# Patient Record
Sex: FEMALE | Race: WHITE | Employment: UNEMPLOYED | URBAN - METROPOLITAN AREA
[De-identification: names, ages, dates, MRNs, and addresses within clinical notes are randomized per-mention and may not be internally consistent; named-entity substitution may affect disease eponyms.]

---

## 2017-01-27 ENCOUNTER — DOCUMENTATION ONLY (OUTPATIENT)
Dept: NEUROLOGY | Age: 57
End: 2017-01-27

## 2017-01-27 DIAGNOSIS — M62.838 MUSCLE SPASMS OF BOTH LOWER EXTREMITIES: ICD-10-CM

## 2017-01-27 RX ORDER — DIAZEPAM 5 MG/1
TABLET ORAL
Qty: 90 TAB | Refills: 0 | Status: SHIPPED | OUTPATIENT
Start: 2017-01-27 | End: 2017-02-28 | Stop reason: SDUPTHER

## 2017-02-28 DIAGNOSIS — M62.838 MUSCLE SPASMS OF BOTH LOWER EXTREMITIES: ICD-10-CM

## 2017-03-01 ENCOUNTER — DOCUMENTATION ONLY (OUTPATIENT)
Dept: NEUROLOGY | Age: 57
End: 2017-03-01

## 2017-03-01 RX ORDER — DIAZEPAM 5 MG/1
TABLET ORAL
Qty: 90 TAB | Refills: 0 | Status: SHIPPED | OUTPATIENT
Start: 2017-03-01 | End: 2017-03-07 | Stop reason: SDUPTHER

## 2017-03-07 ENCOUNTER — OFFICE VISIT (OUTPATIENT)
Dept: NEUROLOGY | Age: 57
End: 2017-03-07

## 2017-03-07 VITALS
HEART RATE: 91 BPM | HEIGHT: 58 IN | DIASTOLIC BLOOD PRESSURE: 72 MMHG | WEIGHT: 111 LBS | BODY MASS INDEX: 23.3 KG/M2 | OXYGEN SATURATION: 98 % | RESPIRATION RATE: 12 BRPM | SYSTOLIC BLOOD PRESSURE: 126 MMHG

## 2017-03-07 DIAGNOSIS — H53.2 DIPLOPIA: ICD-10-CM

## 2017-03-07 DIAGNOSIS — G25.82 STIFF-MAN SYNDROME: ICD-10-CM

## 2017-03-07 DIAGNOSIS — M62.838 MUSCLE SPASMS OF BOTH LOWER EXTREMITIES: ICD-10-CM

## 2017-03-07 DIAGNOSIS — M50.90 CERVICAL NECK PAIN WITH EVIDENCE OF DISC DISEASE: ICD-10-CM

## 2017-03-07 DIAGNOSIS — R41.3 MEMORY LOSS: Primary | ICD-10-CM

## 2017-03-07 DIAGNOSIS — R53.1 WEAKNESS GENERALIZED: ICD-10-CM

## 2017-03-07 DIAGNOSIS — M54.40 BACK PAIN OF LUMBOSACARAL REGION WITH SCIATICA: ICD-10-CM

## 2017-03-07 DIAGNOSIS — G60.9 UNSPECIFIED HEREDITARY AND IDIOPATHIC PERIPHERAL NEUROPATHY: ICD-10-CM

## 2017-03-07 RX ORDER — LANOLIN ALCOHOL/MO/W.PET/CERES
400 CREAM (GRAM) TOPICAL DAILY
COMMUNITY

## 2017-03-07 RX ORDER — DIAZEPAM 5 MG/1
TABLET ORAL
Qty: 90 TAB | Refills: 5 | Status: SHIPPED | OUTPATIENT
Start: 2017-03-07 | End: 2017-10-24 | Stop reason: SDUPTHER

## 2017-03-07 NOTE — MR AVS SNAPSHOT
Visit Information Date & Time Provider Department Dept. Phone Encounter #  
 3/7/2017  3:20 PM Denise Walker MD [facility] [phone] [id number] Follow-up Instructions Return in about 6 months (around 9/7/2017). Your Appointments 5/8/2017 10:10 AM  
Follow Up with Bruce Nur MD  
[facility] [address] [place] [address]) Appt Note: 6 month f/u [address] [facility] [address] [id number] [address] [facility]  
  
    
 10/24/2017  3:00 PM  
Follow Up with Denise Walker MD  
[facility] [address] [place] [address] Road) Appt Note: f/u gait/memory, jrb 3/7/17  
 [address] [place] [address], 
[address], [address] [phone]  
[address], [address] [facility], [address] [phone] Upcoming Health Maintenance Date Due Hepatitis C Screening 1960 DTaP/Tdap/Td series (1 - Tdap) 8/28/1980 PAP AKA CERVICAL CYTOLOGY 8/28/1980 BREAST CANCER SCRN MAMMOGRAM 8/28/2009 FOBT Q 1 YEAR AGE 50-75 8/28/2009 INFLUENZA AGE 9 TO ADULT 8/1/2016 Allergies as of 3/7/2017  Review Complete On: 3/7/2017 By: Denise Walker MD  
  
 Severity Noted Reaction Type Reactions Other Food  01/06/2015    Hives Solu medrol Methimazole  02/27/2012    Rash Current Immunizations  Reviewed on 11/7/2014 No immunizations on file. Not reviewed this visit You Were Diagnosed With   
  
 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  
 126/72 91 12 4' 10\" (1.473 m) 111 lb (50.3 kg) 98% BMI OB Status Smoking Status 23.2 kg/m2 Postmenopausal Never Smoker Vitals History BMI and BSA Data Body Mass Index Body Surface Area  
 23.2 kg/m 2 1.43 m 2 Preferred Pharmacy Pharmacy Name Phone [phone], [address] [place] [address] Reusing [phone] Your Updated Medication List  
  
   
This list is accurate as of: 3/7/17  3:45 PM.  Always use your most recent med list.  
  
  
  
  
 b complex vitamins tablet Take 1 Tab by mouth as needed. diazePAM 5 mg tablet Commonly known as:  VALIUM  
TAKE 1 TABLET BY MOUTH THREE TIMES DAILY AS NEEDED FOR MUSCLE SPASMS FISH OIL 1,000 mg Cap Generic drug:  omega-3 fatty acids-vitamin e Take 1-2 Caps by mouth every other day. magnesium oxide 400 mg tablet Commonly known as:  MAG-OX Take 400 mg by mouth daily. selenium 200 mcg Cap Take  by mouth two (2) times a day. VITAMIN D3 1,000 unit tablet Generic drug:  cholecalciferol Take 2,000 Units by mouth daily. Prescriptions Printed Refills  
 diazePAM (VALIUM) 5 mg tablet 5 Sig: TAKE 1 TABLET BY MOUTH THREE TIMES DAILY AS NEEDED FOR MUSCLE SPASMS Class: Print We Performed the Following CBC WITH AUTOMATED DIFF [25450 CPT(R)] CK B448261 CPT(R)] MAGNESIUM Q827279 CPT(R)] METABOLIC PANEL, COMPREHENSIVE [73503 CPT(R)] SED RATE (ESR) [id number] CPT(R)] Follow-up Instructions Return in about 6 months (around 9/7/2017). Patient Instructions A Healthy Lifestyle: Care Instructions Your Care Instructions A healthy lifestyle can help you feel good, stay at a healthy weight, and have plenty of energy for both work and play. A healthy lifestyle is something you can share with your whole family. A healthy lifestyle also can lower your risk for serious health problems, such as high blood pressure, heart disease, and diabetes. You can follow a few steps listed below to improve your health and the health of your family. Follow-up care is a key part of your treatment and safety. Be sure to make and go to all appointments, and call your doctor if you are having problems. Its also a good idea to know your test results and keep a list of the medicines you take. How can you care for yourself at home? · Do not eat too much sugar, fat, or fast foods. You can still have dessert and treats now and then. The goal is moderation. · Start small to improve your eating habits. Pay attention to portion sizes, drink less juice and soda pop, and eat more fruits and vegetables. ¨ Eat a healthy amount of food. A 3-ounce serving of meat, for example, is about the size of a deck of cards. Fill the rest of your plate with vegetables and whole grains. ¨ Limit the amount of soda and sports drinks you have every day. Drink more water when you are thirsty. ¨ Eat at least 5 servings of fruits and vegetables every day. It may seem like a lot, but it is not hard to reach this goal. A serving or helping is 1 piece of fruit, 1 cup of vegetables, or 2 cups of leafy, raw vegetables. Have an apple or some carrot sticks as an afternoon snack instead of a candy bar. Try to have fruits and/or vegetables at every meal. 
· Make exercise part of your daily routine. You may want to start with simple activities, such as walking, bicycling, or slow swimming.  Try to be active 30 to 60 minutes every day. You do not need to do all 30 to 60 minutes all at once. For example, you can exercise 3 times a day for 10 or 20 minutes. Moderate exercise is safe for most people, but it is always a good idea to talk to your doctor before starting an exercise program. 
· Keep moving. Sherry Limb the lawn, work in the garden, or [employer]. Take the stairs instead of the elevator at work. · If you smoke, quit. People who smoke have an increased risk for heart attack, stroke, cancer, and other lung illnesses. Quitting is hard, but there are ways to boost your chance of quitting tobacco for good. ¨ Use nicotine gum, patches, or lozenges. ¨ Ask your doctor about stop-smoking programs and medicines. ¨ Keep trying. In addition to reducing your risk of diseases in the future, you will notice some benefits soon after you stop using tobacco. If you have shortness of breath or asthma symptoms, they will likely get better within a few weeks after you quit. · Limit how much alcohol you drink. Moderate amounts of alcohol (up to 2 drinks a day for men, 1 drink a day for women) are okay. But drinking too much can lead to liver problems, high blood pressure, and other health problems. Family health If you have a family, there are many things you can do together to improve your health. · Eat meals together as a family as often as possible. · Eat healthy foods. This includes fruits, vegetables, lean meats and dairy, and whole grains. · Include your family in your fitness plan. Most people think of activities such as jogging or tennis as the way to fitness, but there are many ways you and your family can be more active. Anything that makes you breathe hard and gets your heart pumping is exercise. Here are some tips: 
¨ Walk to do errands or to take your child to school or the bus. ¨ Go for a family bike ride after dinner instead of watching TV. Where can you learn more? Go to http://gus-faye.info/. Enter D129 in the search box to learn more about \"A Healthy Lifestyle: Care Instructions. \" Current as of: July 26, 2016 Content Version: 11.1 © 2517-8993 [employer]. Care instructions adapted under license by [employer] (which disclaims liability or warranty for this information). If you have questions about a medical condition or this instruction, always ask your healthcare professional. [address] any warranty or liability for your use of this information. Introducing [facility]! Dear Odilia Fisher: Thank you for requesting a Hubub account. Our records indicate that you already have an active Hubub account. You can access your account anytime at [email or web address] Did you know that you can access your hospital and ER discharge instructions at any time in Hubub? You can also review all of your test results from your hospital stay or ER visit. Additional Information If you have questions, please visit the Frequently Asked Questions section of the Hubub website at [email or web address] Remember, Hubub is NOT to be used for urgent needs. For medical emergencies, dial 911. Now available from your iPhone and Android! Please provide this summary of care documentation to your next provider. Your primary care clinician is listed as Breann Ansari. If you have any questions after today's visit, please call [phone].

## 2017-03-07 NOTE — PATIENT INSTRUCTIONS

## 2017-03-07 NOTE — LETTER
3/7/2017 9:11 PM 
 
Patient:  Ellie Ortiz YOB: 1960 Date of Visit: 3/7/2017 Dear No Recipients: Thank you for referring Ms. Ellie Ortiz to me for evaluation/treatment. Below are the relevant portions of my assessment and plan of care. Consult Subjective:  
 
Ellie Ortiz is a 61 y.o. right-handed  female seen for evaluation of new problem of increasing muscle cramps and muscle aching that the Valium is not always controlling. She try to taper herself down, and the cramps got much worse. She went back on 3 times a day 5 mg Valium, and still having some difficulty with cramps. We asked her to try magnesium oxide 250-400 mg a day. She is to continue her vitamin D and multivitamins every day. She had trouble with memory loss, but neither her nor her  thinks her memory has gotten any worse, and she never got her neuropsych testing, but does not think she needs it at this time and does not want to get rescheduled for it. She had previous MRI of the brain one year ago that just showed 2 microhemorrhages but no other lesions. She has had normal B12 and thyroid functions. She is also seen for episodes of dizziness and muscle spasms and myoclonic jerks and muscle tightness and spasticity. She has severe muscle spasms, headaches, numbness and weakness, double vision, vertigo and undiagnosed neurological disease. Patient had complete metabolic workup done that was all normal, except for an TPO antigen of 312 tested 2 months ago, and patient is followed by endocrine. . Patient referred a [facility] who did an EMG that was normal and saw no evidence of stiff man syndrome. Her voltage-gated potassium calcium channel antibodies were negative, as was the RUSTAM 65 antibody test for stiff man syndrome.  Ramiro Alcala Dr had a negative paraneoplastic panel and workup, and recommended a muscle biopsy but told the patient the yield was 10% or less, and she elected not to do it. Patient has a five-year history of progressive difficulty with muscle spasms, that occur all or her body, including even her tongue, with severe painful muscle spasms that will intact any part of her body including her back, arms and legs, and neck and her tongue. 5 years ago she had the sudden onset of double vision and vertigo with severe headache and dizziness and painful muscle spasms and numbness all over her arms and legs. She has these episodes intermittently for the last 4 years with the most severe problem being the muscle spasms. She had extensive evaluation by her previous neurologist Dr. 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 She was diagnosed with Hashimoto's thyroiditis within an anti-TPO antibody titer of about 500 several years ago. Her repeat titer was 312 a month ago. Her thyroid hormone test has remained stable and normal since. She did have a spinal tab because of the severe headache and muscle spasms that was normal except for a protein of 64, and a normal multiple sclerosis panel, and lab work including CPK in the distant past have been normal. She finds that Valium 5 mg 3 times a day, seemed to help her spasms, and she's been on baclofen 10 mg 3 times a day without success and was on Neurontin 100 mg 3 times a day, but stopped that 2 because it did not seem to help. She has no family history of similar disease. She does feel generally weak. Her bowel and bladder function may be involved also because she did have small bowel obstruction a year or 2 ago of unclear cause. She has no fever or rashes. Nothing in particular seems to bring on her muscle spasms, and don't seem to be related to exercise, eating, or any other clear precipitating factors.  
 
On complete review of systems and symptoms she has Hashimoto's thyroiditis, hysterectomy, small bowel obstruction, syncopal episodes, fatigue and muscle weakness and muscle pain, palpitations and shortness of breath and memory loss and nausea and vomiting and tinnitus and dizziness and difficulty swallowing and blurred or double vision. Past Medical History:  
Diagnosis Date  Hashimoto disease Jan 2012  
 causing hyperthyroidism Dr. 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 Alex Sheehan neurologist  
  
Past Surgical History:  
Procedure Laterality Date  APPENDECTOMY  HX HYSTERECTOMY  HX LEFT SALPINGO-OOPHORECTOMY Family History Problem Relation Age of Onset  Diabetes Other   
  maternal great aunt  Dementia Mother  Heart Disease Father  Thyroid Disease Neg Hx Social History Substance Use Topics  Smoking status: Never Smoker  Smokeless tobacco: Not on file  Alcohol use Yes Comment: on holidays Current Outpatient Prescriptions Medication Sig Dispense Refill  magnesium oxide (MAG-OX) 400 mg tablet Take 400 mg by mouth daily.  diazePAM (VALIUM) 5 mg tablet TAKE 1 TABLET BY MOUTH THREE TIMES DAILY AS NEEDED FOR MUSCLE SPASMS 90 Tab 5  
 selenium 200 mcg cap Take  by mouth two (2) times a day.  b complex vitamins tablet Take 1 Tab by mouth as needed.  cholecalciferol (VITAMIN D3) 1,000 unit tablet Take 2,000 Units by mouth daily.  omega-3 fatty acids-vitamin e (FISH OIL) 1,000 mg cap Take 1-2 Caps by mouth every other day. Allergies Allergen Reactions  Other Food Hives Solu medrol  Methimazole Rash Review of Systems: A comprehensive review of systems was negative except for: Constitutional: positive for fatigue and malaise Eyes: positive for visual disturbance Ears, nose, mouth, throat, and face: positive for tinnitus and earaches Respiratory: positive for cough, pleurisy/chest pain, wheezing or dyspnea on exertion Cardiovascular: positive for chest pressure/discomfort, irregular heart beats, near-syncope, syncope, exertional chest pressure/discomfort Gastrointestinal: positive for dyspepsia, reflux symptoms, constipation and abdominal pain Genitourinary: positive for frequency and nocturia Musculoskeletal: positive for myalgias, stiff joints, neck pain, back pain and muscle weakness Neurological: positive for headaches, dizziness, vertigo, memory problems, paresthesia, coordination problems, gait problems and weakness Behvioral/Psych: positive for anxiety and depression Endocrine: positive for thyroid problems Vitals:  
 03/07/17 1521 BP: 126/72 Pulse: 91  
Resp: 12 SpO2: 98% Weight: 111 lb (50.3 kg) Height: 4' 10\" (1.473 m) Objective: I 
 
 
NEUROLOGICAL EXAM: 
 
Appearance: The patient is thinly developed and nourished, provides a coherent history and is in no acute distress. Mental Status: Oriented to time, place and person and the president. Fund of knowledge and cognitive functions seems normal but she is slow in response. Speech is fluent without aphasia or dysarthria. Mood and affect anxious and depressed. Cranial Nerves:   Intact visual fields. Fundi are benign. DEMETRIO, EOM's full, no nystagmus, no ptosis. Facial sensation is normal. Corneal reflexes are not tested. Facial movement is symmetric. Hearing is normal bilaterally. Palate is midline with normal sternocleidomastoid and trapezius muscles are normal. Tongue is midline. Neck supple without meningismus or bruits Temporal arteries not tender or enlarged Motor:  5/5 strength in upper and lower proximal and distal muscles. Normal bulk and tone. No fasciculations. No percussion myotonia, or easy cramping Reflexes:   Deep tendon reflexes 2+/4 and symmetrical. 
No babinski or clonus present Sensory:   Normal to touch, pinprick and vibration and DSS. Gait:  Normal gait. Tremor:   No tremor noted. Cerebellar:  No cerebellar signs present. Neurovascular:  Normal heart sounds and regular rhythm, peripheral pulses intact, and no carotid bruits. Assessment: ICD-10-CM ICD-9-CM 1. Memory loss N73.8 787.51 METABOLIC PANEL, COMPREHENSIVE  
   CK  
   CBC WITH AUTOMATED DIFF  
   SED RATE (ESR) MAGNESIUM  
   diazePAM (VALIUM) 5 mg tablet 2. Weakness generalized B73.0 549.73 METABOLIC PANEL, COMPREHENSIVE  
   CK  
   CBC WITH AUTOMATED DIFF  
   SED RATE (ESR) MAGNESIUM  
   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  
   CK  
   CBC WITH AUTOMATED DIFF  
   SED RATE (ESR) MAGNESIUM  
   diazePAM (VALIUM) 5 mg tablet 4. Unspecified hereditary and idiopathic peripheral neuropathy F79.0 262.6 METABOLIC PANEL, COMPREHENSIVE  
   CK  
   CBC WITH AUTOMATED DIFF  
   SED RATE (ESR) MAGNESIUM  
   diazePAM (VALIUM) 5 mg tablet 5. Back pain of lumbosacaral region with sciatica Y03.40 294.4 METABOLIC PANEL, COMPREHENSIVE  
  724.3 CK  
   CBC WITH AUTOMATED DIFF  
   SED RATE (ESR) MAGNESIUM  
   diazePAM (VALIUM) 5 mg tablet 6. Cervical neck pain with evidence of disc disease I05.56 274.74 METABOLIC PANEL, COMPREHENSIVE  
   CK  
   CBC WITH AUTOMATED DIFF  
   SED RATE (ESR) MAGNESIUM  
   diazePAM (VALIUM) 5 mg tablet 7. Diplopia J55.6 756.5 METABOLIC PANEL, COMPREHENSIVE  
   CK  
   CBC WITH AUTOMATED DIFF  
   SED RATE (ESR) MAGNESIUM  
   diazePAM (VALIUM) 5 mg tablet 8. Muscle spasms of both lower extremities G70.396 105.17 METABOLIC PANEL, COMPREHENSIVE  
   CK  
   CBC WITH AUTOMATED DIFF  
   SED RATE (ESR) MAGNESIUM  
   diazePAM (VALIUM) 5 mg tablet Plan:  
 
Patient will try magnesium for her muscle cramps, she does not want neuropsych testing for memory loss because she feels she is stable at this time and her  concurs We will recheck her metabolic parameters looking for other causes of her muscle cramps EEG and carotid Doppler exam 1 year ago were normal 
We will repeat some of her blood tests and Dr. Geeta Torres her endocrinologist will repeat her thyroid tests and TPO antigen Patient has unusual constellation of neurological symptoms, seemingly involving both peripheral nervous system and neuromuscular disease and some autonomic symptoms in addition, that is slowly progressive and seems to be getting worse. They're associated with severe muscle spasms. No obvious abnormality on examination to explain her symptoms. Patient refuses muscle biopsy, and [facility] has no answers She will continue her current treatentment She did have a normal EMG study also plus normal metabolic panel and CPK Some of her symptoms may certainly be exacerbated by stress and some of her complaints seem to be functional symptoms  level Difficult case, 35 minutes spent with patient today Followup to be arranged after the above-mentioned evaluation Signed By: Kai Watters MD   
 March 7, 2017 This note will not be viewable in [address] If you have questions, please do not hesitate to call me. I look forward to following Ms. Anders along with you. Sincerely, Kai Watters MD

## 2017-03-08 NOTE — PROGRESS NOTES
Consult    Subjective:     Baldo Ambriz is a 61 y.o. right-handed  female seen for evaluation of new problem of increasing muscle cramps and muscle aching that the Valium is not always controlling. She try to taper herself down, and the cramps got much worse. She went back on 3 times a day 5 mg Valium, and still having some difficulty with cramps. We asked her to try magnesium oxide 250-400 mg a day. She is to continue her vitamin D and multivitamins every day. She had trouble with memory loss, but neither her nor her  thinks her memory has gotten any worse, and she never got her neuropsych testing, but does not think she needs it at this time and does not want to get rescheduled for it. She had previous MRI of the brain one year ago that just showed 2 microhemorrhages but no other lesions. She has had normal B12 and thyroid functions. She is also seen for episodes of dizziness and muscle spasms and myoclonic jerks and muscle tightness and spasticity. She has severe muscle spasms, headaches, numbness and weakness, double vision, vertigo and undiagnosed neurological disease. Patient had complete metabolic workup done that was all normal, except for an TPO antigen of 312 tested 2 months ago, and patient is followed by endocrine. . Patient referred a [facility] who did an EMG that was normal and saw no evidence of stiff man syndrome. Her voltage-gated potassium calcium channel antibodies were negative, as was the RUSTAM 65 antibody test for stiff man syndrome. Ramiro Alcala Dr had a negative paraneoplastic panel and workup, and recommended a muscle biopsy but told the patient the yield was 10% or less, and she elected not to do it. Patient has a five-year history of progressive difficulty with muscle spasms, that occur all or her body, including even her tongue, with severe painful muscle spasms that will intact any part of her body including her back, arms and legs, and neck and her tongue.  5 years ago she had the sudden onset of double vision and vertigo with severe headache and dizziness and painful muscle spasms and numbness all over her arms and legs. She has these episodes intermittently for the last 4 years with the most severe problem being the muscle spasms. She had extensive evaluation by her previous neurologist Dr. 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 She was diagnosed with Hashimoto's thyroiditis within an anti-TPO antibody titer of about 500 several years ago. Her repeat titer was 312 a month ago. Her thyroid hormone test has remained stable and normal since. She did have a spinal tab because of the severe headache and muscle spasms that was normal except for a protein of 64, and a normal multiple sclerosis panel, and lab work including CPK in the distant past have been normal. She finds that Valium 5 mg 3 times a day, seemed to help her spasms, and she's been on baclofen 10 mg 3 times a day without success and was on Neurontin 100 mg 3 times a day, but stopped that 2 because it did not seem to help. She has no family history of similar disease. She does feel generally weak. Her bowel and bladder function may be involved also because she did have small bowel obstruction a year or 2 ago of unclear cause. She has no fever or rashes. Nothing in particular seems to bring on her muscle spasms, and don't seem to be related to exercise, eating, or any other clear precipitating factors. On complete review of systems and symptoms she has Hashimoto's thyroiditis, hysterectomy, small bowel obstruction, syncopal episodes, fatigue and muscle weakness and muscle pain, palpitations and shortness of breath and memory loss and nausea and vomiting and tinnitus and dizziness and difficulty swallowing and blurred or double vision.     Past Medical History:   Diagnosis Date    Hashimoto disease Jan 2012    causing hyperthyroidism Dr. Adria Atkinson Kidney stones     Salivary gland stone     Small bowel obstruction ([facility] [address] 3/30/2014    Stiff person syndrome 2010    Dr. Cecile Ely neurologist      Past Surgical History:   Procedure Laterality Date    APPENDECTOMY      HX HYSTERECTOMY      HX LEFT SALPINGO-OOPHORECTOMY       Family History   Problem Relation Age of Onset    Diabetes Other      maternal great aunt    Dementia Mother     Heart Disease Father     Thyroid Disease Neg Hx       Social History   Substance Use Topics    Smoking status: Never Smoker    Smokeless tobacco: Not on file    Alcohol use Yes      Comment: on holidays       Current Outpatient Prescriptions   Medication Sig Dispense Refill    magnesium oxide (MAG-OX) 400 mg tablet Take 400 mg by mouth daily.  diazePAM (VALIUM) 5 mg tablet TAKE 1 TABLET BY MOUTH THREE TIMES DAILY AS NEEDED FOR MUSCLE SPASMS 90 Tab 5    selenium 200 mcg cap Take  by mouth two (2) times a day.  b complex vitamins tablet Take 1 Tab by mouth as needed.  cholecalciferol (VITAMIN D3) 1,000 unit tablet Take 2,000 Units by mouth daily.  omega-3 fatty acids-vitamin e (FISH OIL) 1,000 mg cap Take 1-2 Caps by mouth every other day.           Allergies   Allergen Reactions    Other Food Hives     Solu medrol     Methimazole Rash        Review of Systems:  A comprehensive review of systems was negative except for: Constitutional: positive for fatigue and malaise  Eyes: positive for visual disturbance  Ears, nose, mouth, throat, and face: positive for tinnitus and earaches  Respiratory: positive for cough, pleurisy/chest pain, wheezing or dyspnea on exertion  Cardiovascular: positive for chest pressure/discomfort, irregular heart beats, near-syncope, syncope, exertional chest pressure/discomfort  Gastrointestinal: positive for dyspepsia, reflux symptoms, constipation and abdominal pain  Genitourinary: positive for frequency and nocturia  Musculoskeletal: positive for myalgias, stiff joints, neck pain, back pain and muscle weakness  Neurological: positive for headaches, dizziness, vertigo, memory problems, paresthesia, coordination problems, gait problems and weakness  Behvioral/Psych: positive for anxiety and depression  Endocrine: positive for thyroid problems   Vitals:    03/07/17 1521   BP: 126/72   Pulse: 91   Resp: 12   SpO2: 98%   Weight: 111 lb (50.3 kg)   Height: 4' 10\" (1.473 m)     Objective:     I      NEUROLOGICAL EXAM:    Appearance: The patient is thinly developed and nourished, provides a coherent history and is in no acute distress. Mental Status: Oriented to time, place and person and the president. Fund of knowledge and cognitive functions seems normal but she is slow in response. Speech is fluent without aphasia or dysarthria. Mood and affect anxious and depressed. Cranial Nerves:   Intact visual fields. Fundi are benign. DEMETRIO, EOM's full, no nystagmus, no ptosis. Facial sensation is normal. Corneal reflexes are not tested. Facial movement is symmetric. Hearing is normal bilaterally. Palate is midline with normal sternocleidomastoid and trapezius muscles are normal. Tongue is midline. Neck supple without meningismus or bruits  Temporal arteries not tender or enlarged   Motor:  5/5 strength in upper and lower proximal and distal muscles. Normal bulk and tone. No fasciculations. No percussion myotonia, or easy cramping   Reflexes:   Deep tendon reflexes 2+/4 and symmetrical.  No babinski or clonus present   Sensory:   Normal to touch, pinprick and vibration and DSS. Gait:  Normal gait. Tremor:   No tremor noted. Cerebellar:  No cerebellar signs present. Neurovascular:  Normal heart sounds and regular rhythm, peripheral pulses intact, and no carotid bruits. Assessment:       ICD-10-CM ICD-9-CM    1.  Memory loss X87.8 759.30 METABOLIC PANEL, COMPREHENSIVE      CK      CBC WITH AUTOMATED DIFF      SED RATE (ESR)      MAGNESIUM      diazePAM (VALIUM) 5 mg tablet 2. Weakness generalized H13.9 325.31 METABOLIC PANEL, COMPREHENSIVE      CK      CBC WITH AUTOMATED DIFF      SED RATE (ESR)      MAGNESIUM      diazePAM (VALIUM) 5 mg tablet   3. 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 Unspecified hereditary and idiopathic peripheral neuropathy F35.9 817.6 METABOLIC PANEL, COMPREHENSIVE      CK      CBC WITH AUTOMATED DIFF      SED RATE (ESR)      MAGNESIUM      diazePAM (VALIUM) 5 mg tablet   5. Back pain of lumbosacaral region with sciatica F30.05 974.7 METABOLIC PANEL, COMPREHENSIVE     724.3 CK      CBC WITH AUTOMATED DIFF      SED RATE (ESR)      MAGNESIUM      diazePAM (VALIUM) 5 mg tablet   6. Cervical neck pain with evidence of disc disease M81.26 609.04 METABOLIC PANEL, COMPREHENSIVE      CK      CBC WITH AUTOMATED DIFF      SED RATE (ESR)      MAGNESIUM      diazePAM (VALIUM) 5 mg tablet   7. Diplopia O45.6 774.2 METABOLIC PANEL, COMPREHENSIVE      CK      CBC WITH AUTOMATED DIFF      SED RATE (ESR)      MAGNESIUM      diazePAM (VALIUM) 5 mg tablet   8.  Muscle spasms of both lower extremities Z79.458 150.69 METABOLIC PANEL, COMPREHENSIVE      CK      CBC WITH AUTOMATED DIFF      SED RATE (ESR)      MAGNESIUM      diazePAM (VALIUM) 5 mg tablet         Plan:     Patient will try magnesium for her muscle cramps, she does not want neuropsych testing for memory loss because she feels she is stable at this time and her  concurs  We will recheck her metabolic parameters looking for other causes of her muscle cramps  EEG and carotid Doppler exam 1 year ago were normal  We will repeat some of her blood tests and Dr. Geeta Torres her endocrinologist will repeat her thyroid tests and TPO antigen  Patient has unusual constellation of neurological symptoms, seemingly involving both peripheral nervous system and neuromuscular disease and some autonomic symptoms in addition, that is slowly progressive and seems to be getting worse. They're associated with severe muscle spasms. No obvious abnormality on examination to explain her symptoms. Patient refuses muscle biopsy, and [facility] has no answers  She will continue her current treatentment  She did have a normal EMG study also plus normal metabolic panel and CPK  Some of her symptoms may certainly be exacerbated by stress and some of her complaints seem to be functional symptoms  level  Difficult case, 35 minutes spent with patient today  Followup to be arranged after the above-mentioned evaluation    Signed By: Vicki Nolasco MD     March 7, 2017         This note will not be viewable in [address]

## 2017-03-14 LAB
ALBUMIN SERPL-MCNC: 4.5 G/DL (ref 3.5–5.5)
ALBUMIN/GLOB SERPL: 1.8 {RATIO} (ref 1.2–2.2)
ALP SERPL-CCNC: 53 IU/L (ref 39–117)
ALT SERPL-CCNC: 16 IU/L (ref 0–32)
AST SERPL-CCNC: 21 IU/L (ref 0–40)
BASOPHILS # BLD AUTO: 0 X10E3/UL (ref 0–0.2)
BASOPHILS NFR BLD AUTO: 1 %
BILIRUB SERPL-MCNC: 0.3 MG/DL (ref 0–1.2)
BUN SERPL-MCNC: 13 MG/DL (ref 6–24)
BUN/CREAT SERPL: 24 (ref 9–23)
CALCIUM SERPL-MCNC: 9.6 MG/DL (ref 8.7–10.2)
CHLORIDE SERPL-SCNC: 98 MMOL/L (ref 96–106)
CK SERPL-CCNC: 95 U/L (ref 24–173)
CO2 SERPL-SCNC: 27 MMOL/L (ref 18–29)
CREAT SERPL-MCNC: 0.55 MG/DL (ref 0.57–1)
EOSINOPHIL # BLD AUTO: 0.1 X10E3/UL (ref 0–0.4)
EOSINOPHIL NFR BLD AUTO: 2 %
ERYTHROCYTE [DISTWIDTH] IN BLOOD BY AUTOMATED COUNT: 13.3 % (ref 12.3–15.4)
ERYTHROCYTE [SEDIMENTATION RATE] IN BLOOD BY WESTERGREN METHOD: 2 MM/HR (ref 0–40)
GLOBULIN SER CALC-MCNC: 2.5 G/DL (ref 1.5–4.5)
GLUCOSE SERPL-MCNC: 94 MG/DL (ref 65–99)
HCT VFR BLD AUTO: 38.8 % (ref 34–46.6)
HGB BLD-MCNC: 13.1 G/DL (ref 11.1–15.9)
IMM GRANULOCYTES # BLD: 0 X10E3/UL (ref 0–0.1)
IMM GRANULOCYTES NFR BLD: 0 %
LYMPHOCYTES # BLD AUTO: 2.5 X10E3/UL (ref 0.7–3.1)
LYMPHOCYTES NFR BLD AUTO: 34 %
MAGNESIUM SERPL-MCNC: 2 MG/DL (ref 1.6–2.3)
MCH RBC QN AUTO: 33.4 PG (ref 26.6–33)
MCHC RBC AUTO-ENTMCNC: 33.8 G/DL (ref 31.5–35.7)
MCV RBC AUTO: 99 FL (ref 79–97)
MONOCYTES # BLD AUTO: 0.5 X10E3/UL (ref 0.1–0.9)
MONOCYTES NFR BLD AUTO: 7 %
NEUTROPHILS # BLD AUTO: 4.3 X10E3/UL (ref 1.4–7)
NEUTROPHILS NFR BLD AUTO: 56 %
PLATELET # BLD AUTO: 338 X10E3/UL (ref 150–379)
POTASSIUM SERPL-SCNC: 4.4 MMOL/L (ref 3.5–5.2)
PROT SERPL-MCNC: 7 G/DL (ref 6–8.5)
RBC # BLD AUTO: 3.92 X10E6/UL (ref 3.77–5.28)
SODIUM SERPL-SCNC: 141 MMOL/L (ref 134–144)
WBC # BLD AUTO: 7.5 X10E3/UL (ref 3.4–10.8)

## 2017-06-06 LAB
25(OH)D3+25(OH)D2 SERPL-MCNC: 38.5 NG/ML (ref 30–100)
THYROPEROXIDASE AB SERPL-ACNC: 329 IU/ML (ref 0–34)
TSH SERPL DL<=0.005 MIU/L-ACNC: 1.03 UIU/ML (ref 0.45–4.5)

## 2017-06-12 ENCOUNTER — OFFICE VISIT (OUTPATIENT)
Dept: ENDOCRINOLOGY | Age: 57
End: 2017-06-12

## 2017-06-12 VITALS
BODY MASS INDEX: 23.47 KG/M2 | DIASTOLIC BLOOD PRESSURE: 77 MMHG | HEART RATE: 92 BPM | WEIGHT: 111.8 LBS | HEIGHT: 58 IN | SYSTOLIC BLOOD PRESSURE: 133 MMHG

## 2017-06-12 DIAGNOSIS — E55.9 VITAMIN D DEFICIENCY: ICD-10-CM

## 2017-06-12 DIAGNOSIS — E06.3 HASHIMOTO'S DISEASE: Primary | ICD-10-CM

## 2017-06-12 RX ORDER — CLOBETASOL PROPIONATE 0.5 MG/G
OINTMENT TOPICAL 2 TIMES DAILY
COMMUNITY
End: 2018-06-29

## 2017-06-12 NOTE — PROGRESS NOTES
Chief Complaint   Patient presents with    Thyroid Problem     pcp and pharmacy verified. History of Present Illness: Traci Carmona is a 61 y.o. female here for follow up of thyroid. Weight up 9 lbs since last visit in 11/16. Still having muscle spasms and back and leg pains. Has had more outbreaks of eczema since the spring and has been seeing a dermatologist, Dr. Elson Dubin, and applied clobetasol cream and this cleared up but is still having some outbreaks. Still taking the selenium and vitamin D but only taking selenium once daily and previously was taking bid so advised her to go back to bid dosing to see if this helps lower the TPO ab level. Has had more cold intolerance. She was recommended to start magnesium supplement by Dr. Jose Enrique Gould at her visit in 3/17 but her level was normal at 2.0 so she decided not to start the supplement at that time but may consider starting in the future. She still would like to hold on thyroid medication. She has an obamacare plan that we don't accept so I advised her to change to a different one in the new year. Current Outpatient Prescriptions   Medication Sig    clobetasol (TEMOVATE) 0.05 % ointment Apply  to affected area two (2) times a day.  magnesium oxide (MAG-OX) 400 mg tablet Take 400 mg by mouth daily.  diazePAM (VALIUM) 5 mg tablet TAKE 1 TABLET BY MOUTH THREE TIMES DAILY AS NEEDED FOR MUSCLE SPASMS    selenium 200 mcg cap Take  by mouth two (2) times a day.  b complex vitamins tablet Take 1 Tab by mouth as needed.  cholecalciferol (VITAMIN D3) 1,000 unit tablet Take 2,000 Units by mouth daily.  omega-3 fatty acids-vitamin e (FISH OIL) 1,000 mg cap Take 1-2 Caps by mouth every other day. No current facility-administered medications for this visit.       Allergies   Allergen Reactions    Other Food Hives     Solu medrol     Methimazole Rash     Review of Systems:  - Cardiovascular: no chest pain  - Neurological: no tremors  - Integumentary: skin is normal    Physical Examination:  Blood pressure 133/77, pulse 92, height 4' 10\" (1.473 m), weight 111 lb 12.8 oz (50.7 kg). - General: pleasant, no distress, good eye contact   - Neck: small goiter  - Cardiovascular: regular, normal rate, nl s1 and s2, no m/r/g   - Integumentary: skin is normal, no edema  - Neurological: reflexes 2+ at biceps, mild tremor  - Psychiatric: normal mood and affect    Data Reviewed:   Component      Latest Ref Rng & Units 6/5/2017 6/5/2017 6/5/2017          10:28 AM 10:28 AM 10:28 AM   TSH      0.450 - 4.500 uIU/mL   1.030   VITAMIN D, 25-HYDROXY      30.0 - 100.0 ng/mL  38.5    Thyroid peroxidase Ab      0 - 34 IU/mL 329 (H)         Assessment/Plan:     1. Hashimoto's disease she had several symptoms of hyperthyroidism and in Nov 2011 had her thyroid levels checked and her TSH was low at 0.03 and this was repeated 10 days later and it was a little higher but still low at 0.15 with a normal free T4 of 1.07 (0.58-1.6) and total T3 of 1.53 (0.87-1.78)  with a normal T3 and Free T4. On repeat in January 2012 her TSH was still low at 0.008 with a normal free T4 and total T3. Her TPO ab was elevated at 593 in 1/12 showing she has Hashimoto's disease but her TSH receptor ab was negative so she does not have Graves disease. She developed a rash with methimazole after 3 weeks of treatment so we stopped this and the rash went away. Her TSH level was higher at 0.376 in March 2012 than it was in January at 0.008 so it's possible she was switching from hyperthyroidism to hypothyroidism. In May her TSH was normal at 0.821 and again in July it was still normal at 0.594 and remained normal in Sept at 1.03 and Nov at 0.766 and Feb 2013 at 0.702 and May at 0.991 and August at 1.11 and 1.24 in 1/14 and 0.82 in 5/14 and 0.73 in 9/14 and 1.05 in 12/14. Repeat TPO ab still elevated at 398 in 8/14 but lower than in 2012.  She does not appear to have either overactive or underactive thyroid for sure to explain any of her symptoms and have never given her any further treatment for her thyroid aside from the brief course of methimazole in 2012. She has had extensive evaluation at [facility] and by Dr. Melody Leiva and so far it's unclear exactly what her neurological condition is. I have screened her for adrenal insufficiency and this was negative and her TREVOR, ANCA and RF were normal in 3/12. Since no other diagnosis could be found, we agreed to do a trial of low dose of tirosint 13 mcg daily to see if this could help with any of her symptoms when I saw her in 1/15 but she felt worse on this and stopped after 1 week. Her repeat TSH was 0.8 in 2/15 and TPO antibody was 332 at that time. Still 0.87 in 5/15 so remains euthyroid. Gave her a 6 month trial of selenium 200 mcg bid to see if this has any effect on her TPO ab level and any of her symptoms and although her TPO ab was down to 291 in 11/15 and TSH remained normal at 0.748, her symptoms remained the same so I highly doubt her Hashimoto's is the cause of her neuro symptoms and recommended to her to consider an opinion at another institution like [facility] or [facility] if she still can't get any answers. TSH up to 1.56 and TPO ab 312 in 11/16 and offered her another trial of medication given her TSH is the highest it's been in 5 years but she wanted to hold on this for now and TSH down to 1.03 but TPO ab higher at 329 in 6/17. Still wants to hold on medication but advised her to go back to bid dosing of selenium as she had only been taking once daily  - cont selenium 200 mcg bid    - Check TSH and TPO ab prior to next visit     2. Vitamin D deficiency: level was 28 in 11/14 on 1000 units daily.   Has been taking 2000 units daily since then and vitamin D was up to 40 in 2/15 and 39 in 5/15 and 43 in 11/15 and 38 in 6/17.  - cont vitamin D 2000 units daily  - check Vitamin D 25-OH level prior to next visit     Patient Instructions   1) Your TSH (thyroid test) is still normal and slightly lower than last time. However your TPO antibody level is higher though it is not as high as it was in 2014 and in 2012 so go back to taking one tab twice daily of selenium. We will still hold on any treatment at this time. 2) Your vitamin D level remains normal so keep taking 9717-0830 units of vitamin D daily. 3) If you want to give a trial of magnesium over the counter one tab daily I think this is reasonable. Follow-up Disposition:  Return in about 7 months (around 2018).     Copy sent to:  Dr. Osman Sheffield via [facility]  Dr. Blum Presumjuana

## 2017-06-12 NOTE — PATIENT INSTRUCTIONS
1) Your TSH (thyroid test) is still normal and slightly lower than last time. However your TPO antibody level is higher though it is not as high as it was in 2014 and in 2012 so go back to taking one tab twice daily of selenium. We will still hold on any treatment at this time. 2) Your vitamin D level remains normal so keep taking 9166-9403 units of vitamin D daily. 3) If you want to give a trial of magnesium over the counter one tab daily I think this is reasonable.

## 2017-06-12 NOTE — MR AVS SNAPSHOT
Visit Information Date & Time Provider Department Dept. Phone Encounter #  
 6/12/2017  1:30 PM Ahmet Leger, [address] [facility] Diabetes and Endocr[facility]ology [phone] Follow-up Instructions Return in about 7 months (around 1/12/2018). Your Appointments 10/24/2017  3:00 PM  
Follow Up with Joshua Benjamin MD  
Neurology Clinic at [facility] [address]) Appt Note: f/u gait/memory, jrb 3/7/17  
 [address] [place] [address], 
[address] [place] [address], [phone]  
[address] [facility], [address] [facility], [phone] Upcoming Health Maintenance Date Due Hepatitis C Screening 1960 DTaP/Tdap/Td series (1 - Tdap) 8/28/1980 PAP AKA CERVICAL CYTOLOGY 8/28/1980 BREAST CANCER SCRN MAMMOGRAM 8/28/2009 FOBT Q 1 YEAR AGE 50-75 8/28/2009 INFLUENZA AGE 9 TO ADULT 8/1/2017 Allergies as of 6/12/2017  Review Complete On: 6/12/2017 By: Ahmet Leger MD  
  
 Severity Noted Reaction Type Reactions Other Food  01/06/2015    Hives Solu medrol Methimazole  02/27/2012    Rash Current Immunizations  Reviewed on 11/7/2014 No immunizations on file. Not reviewed this visit You Were Diagnosed With   
  
 Codes Comments Hashimoto's disease    -  Primary ICD-10-CM: E06.3 ICD-9-CM: 476. 2 Vitamin D deficiency     ICD-10-CM: E55.9 ICD-9-CM: 268.9 Vitals BP Pulse Height(growth percentile) Weight(growth percentile) BMI OB Status 133/77 (BP 1 Location: Right arm, BP Patient Position: Sitting) 92 4' 10\" (1.473 m) 111 lb 12.8 oz (50.7 kg) 23.37 kg/m2 Postmenopausal  
 Smoking Status Never Smoker BMI and BSA Data Body Mass Index Body Surface Area  
 23.37 kg/m 2 1.44 m 2 Preferred Pharmacy Pharmacy Name Phone [phone], [address] [place] [address] [phone] Your Updated Medication List  
  
   
This list is accurate as of: 6/12/17  1:55 PM.  Always use your most recent med list.  
  
  
  
  
 b complex vitamins tablet Take 1 Tab by mouth as needed. clobetasol 0.05 % ointment Commonly known as:  Banegas Pill Apply  to affected area two (2) times a day. diazePAM 5 mg tablet Commonly known as:  VALIUM  
TAKE 1 TABLET BY MOUTH THREE TIMES DAILY AS NEEDED FOR MUSCLE SPASMS FISH OIL 1,000 mg Cap Generic drug:  omega-3 fatty acids-vitamin e Take 1-2 Caps by mouth every other day. magnesium oxide 400 mg tablet Commonly known as:  MAG-OX Take 400 mg by mouth daily. selenium 200 mcg Cap Take  by mouth two (2) times a day. VITAMIN D3 1,000 unit tablet Generic drug:  cholecalciferol Take 2,000 Units by mouth daily. Follow-up Instructions Return in about 7 months (around 1/12/2018). To-Do List   
 Around 01/12/2018 Lab:  THYROID PEROXIDASE (TPO) AB Around 01/12/2018 Lab:  TSH 3RD GENERATION Around 01/12/2018 Lab:  VITAMIN D, 25 HYDROXY Patient Instructions 1) Your TSH (thyroid test) is still normal and slightly lower than last time. However your TPO antibody level is higher though it is not as high as it was in 2014 and in 2012 so go back to taking one tab twice daily of selenium. We will still hold on any treatment at this time. 2) Your vitamin D level remains normal so keep taking 5522-8279 units of vitamin D daily. 3) If you want to give a trial of magnesium over the counter one tab daily I think this is reasonable. Introducing [facility]! Dear Lucero Salazar: Thank you for requesting a [employer] account. Our records indicate that you already have an active [employer] account. You can access your account anytime at [email or web address] Did you know that you can access your hospital and ER discharge instructions at any time in [employer]? You can also review all of your test results from your hospital stay or ER visit. Additional Information If you have questions, please visit the Frequently Asked Questions section of the [employer] website at [email or web address] Remember, [employer] is NOT to be used for urgent needs. For medical emergencies, dial 911. Now available from your iPhone and Android! Please provide this summary of care documentation to your next provider. Your primary care clinician is listed as Khang Landers. If you have any questions after today's visit, please call [phone].

## 2017-10-24 ENCOUNTER — OFFICE VISIT (OUTPATIENT)
Dept: NEUROLOGY | Age: 57
End: 2017-10-24

## 2017-10-24 VITALS
SYSTOLIC BLOOD PRESSURE: 110 MMHG | DIASTOLIC BLOOD PRESSURE: 72 MMHG | HEART RATE: 81 BPM | BODY MASS INDEX: 19.73 KG/M2 | WEIGHT: 94 LBS | OXYGEN SATURATION: 96 % | HEIGHT: 58 IN

## 2017-10-24 DIAGNOSIS — M62.838 MUSCLE SPASMS OF BOTH LOWER EXTREMITIES: ICD-10-CM

## 2017-10-24 DIAGNOSIS — H53.2 DIPLOPIA: ICD-10-CM

## 2017-10-24 DIAGNOSIS — M54.40 BACK PAIN OF LUMBOSACARAL REGION WITH SCIATICA: ICD-10-CM

## 2017-10-24 DIAGNOSIS — R53.1 WEAKNESS GENERALIZED: ICD-10-CM

## 2017-10-24 DIAGNOSIS — M50.90 CERVICAL NECK PAIN WITH EVIDENCE OF DISC DISEASE: ICD-10-CM

## 2017-10-24 DIAGNOSIS — R41.3 MEMORY LOSS: ICD-10-CM

## 2017-10-24 DIAGNOSIS — G25.82 STIFF-MAN SYNDROME: Primary | ICD-10-CM

## 2017-10-24 DIAGNOSIS — G60.9 HEREDITARY AND IDIOPATHIC PERIPHERAL NEUROPATHY: ICD-10-CM

## 2017-10-24 DIAGNOSIS — R56.01 COMPLEX FEBRILE CONVULSION (HCC): ICD-10-CM

## 2017-10-24 RX ORDER — DIAZEPAM 5 MG/1
TABLET ORAL
Qty: 90 TAB | Refills: 5 | Status: SHIPPED | OUTPATIENT
Start: 2017-10-24 | End: 2018-04-16 | Stop reason: SDUPTHER

## 2017-10-24 NOTE — PATIENT INSTRUCTIONS

## 2017-10-24 NOTE — MR AVS SNAPSHOT
Visit Information Date & Time Provider Department Dept. Phone Encounter #  
 10/24/2017  3:00 PM Flo Pike MD Neurology Clinic at [facility] [phone] [id number] Follow-up Instructions Return in about 6 months (around 4/24/2018). Your Appointments 1/12/2018 10:10 AM  
Follow Up with Pablito Sumner MD  
[facility] Appt Note: [address] [place] [address] [address] [id number] [address] [facility] Upcoming Health Maintenance Date Due Hepatitis C Screening 1960 DTaP/Tdap/Td series (1 - Tdap) 8/28/1980 PAP AKA CERVICAL CYTOLOGY 8/28/1980 BREAST CANCER SCRN MAMMOGRAM 8/28/2009 FOBT Q 1 YEAR AGE 50-75 8/28/2009 INFLUENZA AGE 9 TO ADULT 8/1/2017 Allergies as of 10/24/2017  Review Complete On: 10/24/2017 By: Flo Pike MD  
  
 Severity Noted Reaction Type Reactions Other Food  01/06/2015    Hives Solu medrol Methimazole  02/27/2012    Rash Current Immunizations  Reviewed on 11/7/2014 No immunizations on file. Not reviewed this visit You Were Diagnosed With   
  
 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 
ICD-9-CM: 780.32 Back pain of lumbosacaral region with sciatica     ICD-10-CM: M54.40 ICD-9-CM: 724.2, 724.3 Cervical neck pain with evidence of disc disease     ICD-10-CM: M50.90 ICD-9-CM: 722.91   
 Muscle spasms of both lower extremities     ICD-10-CM: F37.907 ICD-9-CM: 728.85 Diplopia     ICD-10-CM: H53.2 ICD-9-CM: 368.2 Vitals BP Pulse Height(growth percentile) Weight(growth percentile) SpO2 BMI  
 110/72 81 4' 10\" (1.473 m) 94 lb (42.6 kg) 96% 19.65 kg/m2 OB Status Smoking Status Postmenopausal Never Smoker BMI and BSA Data Body Mass Index Body Surface Area 19.65 kg/m 2 1.32 m 2 Preferred Pharmacy Pharmacy Name Phone [address], [address] [place] [address] [facility] [phone] Your Updated Medication List  
  
   
This list is accurate as of: 10/24/17  3:51 PM.  Always use your most recent med list.  
  
  
  
  
 b complex vitamins tablet Take 1 Tab by mouth as needed. clobetasol 0.05 % ointment Commonly known as:  Jameel Bellamy Apply  to affected area two (2) times a day. diazePAM 5 mg tablet Commonly known as:  VALIUM  
TAKE 1 TABLET BY MOUTH THREE TIMES DAILY AS NEEDED FOR MUSCLE SPASMS FISH OIL 1,000 mg Cap Generic drug:  omega-3 fatty acids-vitamin e Take 1-2 Caps by mouth every other day. magnesium oxide 400 mg tablet Commonly known as:  MAG-OX Take 400 mg by mouth daily. selenium 200 mcg Cap Take  by mouth two (2) times a day. VITAMIN D3 1,000 unit tablet Generic drug:  cholecalciferol Take 2,000 Units by mouth daily. Prescriptions Printed Refills  
 diazePAM (VALIUM) 5 mg tablet 5 Sig: TAKE 1 TABLET BY MOUTH THREE TIMES DAILY AS NEEDED FOR MUSCLE SPASMS Class: Print We Performed the Following ANTINEURONAL CELL AB [id number] CPT(R)] CK [id number] CPT(R)] GLIADIN ABS, IGA AND IGG [[id number] Custom] IMMUNOELECTROPHORESIS [facility].) [id number] CPT(R)] SED RATE (ESR) [id number] CPT(R)] Follow-up Instructions Return in about 6 months (around 4/24/2018). To-Do List   
 10/24/2017 Imaging:  XR SPINE LUMB 2 OR 3 V   
  
 10/31/2017 Neurology:  NEURO EEG 24 HR Patient Instructions A Healthy Lifestyle: Care Instructions Your Care Instructions A healthy lifestyle can help you feel good, stay at a healthy weight, and have plenty of energy for both work and play. A healthy lifestyle is something you can share with your whole family. A healthy lifestyle also can lower your risk for serious health problems, such as high blood pressure, heart disease, and diabetes. You can follow a few steps listed below to improve your health and the health of your family. Follow-up care is a key part of your treatment and safety. Be sure to make and go to all appointments, and call your doctor if you are having problems. Its also a good idea to know your test results and keep a list of the medicines you take. How can you care for yourself at home? · Do not eat too much sugar, fat, or fast foods. You can still have dessert and treats now and then. The goal is moderation. · Start small to improve your eating habits. Pay attention to portion sizes, drink less juice and soda pop, and eat more fruits and vegetables. ¨ Eat a healthy amount of food. A 3-ounce serving of meat, for example, is about the size of a deck of cards. Fill the rest of your plate with vegetables and whole grains. ¨ Limit the amount of soda and sports drinks you have every day. Drink more water when you are thirsty. ¨ Eat at least 5 servings of fruits and vegetables every day. It may seem like a lot, but it is not hard to reach this goal. A serving or helping is 1 piece of fruit, 1 cup of vegetables, or 2 cups of leafy, raw vegetables. Have an apple or some carrot sticks as an afternoon snack instead of a candy bar. Try to have fruits and/or vegetables at every meal. 
· Make exercise part of your daily routine. You may want to start with simple activities, such as walking, bicycling, or slow swimming. Try to be active 30 to 60 minutes every day. You do not need to do all 30 to 60 minutes all at once.  For example, you can exercise 3 times a day for 10 or 20 minutes. Moderate exercise is safe for most people, but it is always a good idea to talk to your doctor before starting an exercise program. 
· Keep moving. Sherry Limb the lawn, work in the garden, or [employer]. Take the stairs instead of the elevator at work. · If you smoke, quit. People who smoke have an increased risk for heart attack, stroke, cancer, and other lung illnesses. Quitting is hard, but there are ways to boost your chance of quitting tobacco for good. ¨ Use nicotine gum, patches, or lozenges. ¨ Ask your doctor about stop-smoking programs and medicines. ¨ Keep trying. In addition to reducing your risk of diseases in the future, you will notice some benefits soon after you stop using tobacco. If you have shortness of breath or asthma symptoms, they will likely get better within a few weeks after you quit. · Limit how much alcohol you drink. Moderate amounts of alcohol (up to 2 drinks a day for men, 1 drink a day for women) are okay. But drinking too much can lead to liver problems, high blood pressure, and other health problems. Family health If you have a family, there are many things you can do together to improve your health. · Eat meals together as a family as often as possible. · Eat healthy foods. This includes fruits, vegetables, lean meats and dairy, and whole grains. · Include your family in your fitness plan. Most people think of activities such as jogging or tennis as the way to fitness, but there are many ways you and your family can be more active. Anything that makes you breathe hard and gets your heart pumping is exercise. Here are some tips: 
¨ Walk to do errands or to take your child to school or the bus. ¨ Go for a family bike ride after dinner instead of watching TV. Where can you learn more? Go to http://gus-faye.info/. Enter R459 in the search box to learn more about \"A Healthy Lifestyle: Care Instructions. \" Current as of: July 26, 2016 Content Version: 11.3 © 1701-4496 [employer], [employer]. Care instructions adapted under license by [employer] (which disclaims liability or warranty for this information). If you have questions about a medical condition or this instruction, always ask your healthcare professional. [address] any warranty or liability for your use of this information. Introducing [facility]! Dear Hannah Chahal: Thank you for requesting a PayStand account. Our records indicate that you already have an active PayStand account. You can access your account anytime at [email or web address] Did you know that you can access your hospital and ER discharge instructions at any time in PayStand? You can also review all of your test results from your hospital stay or ER visit. Additional Information If you have questions, please visit the Frequently Asked Questions section of the PayStand website at [email or web address] Remember, PayStand is NOT to be used for urgent needs. For medical emergencies, dial 911. Now available from your iPhone and Android! Please provide this summary of care documentation to your next provider. Your primary care clinician is listed as Katie Millan. If you have any questions after today's visit, please call [phone].

## 2017-10-24 NOTE — LETTER
10/24/2017 8:55 PM 
 
Patient:  Mandy Hoskins YOB: 1960 Date of Visit: 10/24/2017 Dear No Recipients: Thank you for referring Ms. Mandy Hoskins to me for evaluation/treatment. Below are the relevant portions of my assessment and plan of care. Consult Subjective:  
 
Mandy Hoskins is a 61 y.o. right-handed  female seen for evaluation of new problem of increasing severe pain in her right leg that then began to radiate up the whole leg from the heel and has been worsening with back pain ever since at the referral of Dr. Aimee Horton. She felt like she stepped on a sharp nail, but there was no nail present, and the pain went up her whole leg associated with a burning and sharp pain at them and up into her back. Is gone into the left leg some now seems to go into her arms and legs intermittently. Her bowel and bladder function remain stable. She has some chronic back pain, and that has not really gotten all that much worse. She supposedly has some known degenerative disease of the lumbar spine, but I do not see any x-rays are chart. She complains that her eyes are difficult to open and they seem to be squinted closed in the middle the night, and she cannot see, her vision comes and goes, she has twitches and spasms at all her muscles intermittently. She had an episode where she suddenly seemed to pass out, and does not remember anything about it or if she lost consciousness for sure. She complains of muscle spasms all over her body still in her hands cramping with carpal pedal spasms etc.  Valium 5 mg 3 times a day for the muscle spasms, that the only medicine that works, and she is trying magnesium oxide. She is to continue her vitamin D and multivitamins every day.   She had trouble with memory loss, but neither her nor her  thinks her memory has gotten any worse, and she never got her neuropsych testing, but does not think she needs it at this time and does not want to get rescheduled for it. She had previous MRI of the brain one year ago that just showed 2 microhemorrhages but no other lesions. She has had normal B12 and thyroid functions. She is also seen for episodes of dizziness and muscle spasms and myoclonic jerks and muscle tightness and spasticity. She has severe muscle spasms, headaches, numbness and weakness, double vision, vertigo and undiagnosed neurological disease. Patient had complete metabolic workup done that was all normal, except for an TPO antigen of 312 tested 2 months ago, and patient is followed by endocrine. . Patient referred a [facility] who did an EMG that was normal and saw no evidence of stiff man syndrome. Her voltage-gated potassium calcium channel antibodies were negative, as was the RUSTAM 65 antibody test for stiff man syndrome. Ramiro Alcala Dr had a negative paraneoplastic panel and workup, and recommended a muscle biopsy but told the patient the yield was 10% or less, and she elected not to do it. Patient has a five-year history of progressive difficulty with muscle spasms, that occur all or her body, including even her tongue, with severe painful muscle spasms that will intact any part of her body including her back, arms and legs, and neck and her tongue. 5 years ago she had the sudden onset of double vision and vertigo with severe headache and dizziness and painful muscle spasms and numbness all over her arms and legs. She has these episodes intermittently for the last 4 years with the most severe problem being the muscle spasms. She had extensive evaluation by her previous neurologist Dr. 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 She was diagnosed with Hashimoto's thyroiditis within an anti-TPO antibody titer of about 500 several years ago. Her repeat titer was 312 a month ago.  Her thyroid hormone test has remained stable and normal since. She did have a spinal tab because of the severe headache and muscle spasms that was normal except for a protein of 64, and a normal multiple sclerosis panel, and lab work including CPK in the distant past have been normal. She finds that Valium 5 mg 3 times a day, seemed to help her spasms, and she's been on baclofen 10 mg 3 times a day without success and was on Neurontin 100 mg 3 times a day, but stopped that 2 because it did not seem to help. She has no family history of similar disease. She does feel generally weak. Her bowel and bladder function may be involved also because she did have small bowel obstruction a year or 2 ago of unclear cause. She has no fever or rashes. Nothing in particular seems to bring on her muscle spasms, and don't seem to be related to exercise, eating, or any other clear precipitating factors. Normal EEG over a year ago, but no overnight monitoring. On complete review of systems and symptoms she has Hashimoto's thyroiditis, hysterectomy, small bowel obstruction, syncopal episodes, fatigue and muscle weakness and muscle pain, palpitations and shortness of breath and memory loss and nausea and vomiting and tinnitus and dizziness and difficulty swallowing and blurred or double vision. Past Medical History:  
Diagnosis Date  Hashimoto disease Jan 2012  
 causing hyperthyroidism Dr. Terese Roberts  Kidney stones  Salivary gland stone  Small bowel obstruction 3/30/2014  Stiff person syndrome 2010 Dr. Cecile Ely neurologist  
  
Past Surgical History:  
Procedure Laterality Date  APPENDECTOMY  HX HYSTERECTOMY  HX LEFT SALPINGO-OOPHORECTOMY Family History Problem Relation Age of Onset  Diabetes Other   
  maternal great aunt  Dementia Mother  Heart Disease Father  Thyroid Disease Neg Hx Social History Substance Use Topics  Smoking status: Never Smoker  Smokeless tobacco: Never Used  Alcohol use Yes Comment: on holidays Current Outpatient Prescriptions Medication Sig Dispense Refill  diazePAM (VALIUM) 5 mg tablet TAKE 1 TABLET BY MOUTH THREE TIMES DAILY AS NEEDED FOR MUSCLE SPASMS 90 Tab 5  clobetasol (TEMOVATE) 0.05 % ointment Apply  to affected area two (2) times a day.  selenium 200 mcg cap Take  by mouth two (2) times a day.  b complex vitamins tablet Take 1 Tab by mouth as needed.  cholecalciferol (VITAMIN D3) 1,000 unit tablet Take 2,000 Units by mouth daily.  omega-3 fatty acids-vitamin e (FISH OIL) 1,000 mg cap Take 1-2 Caps by mouth every other day.  magnesium oxide (MAG-OX) 400 mg tablet Take 400 mg by mouth daily. Allergies Allergen Reactions  Other Food Hives Solu medrol  Methimazole Rash Review of Systems: A comprehensive review of systems was negative except for: Constitutional: positive for fatigue and malaise Eyes: positive for visual disturbance Ears, nose, mouth, throat, and face: positive for tinnitus and earaches Respiratory: positive for cough, pleurisy/chest pain, wheezing or dyspnea on exertion Cardiovascular: positive for chest pressure/discomfort, irregular heart beats, near-syncope, syncope, exertional chest pressure/discomfort Gastrointestinal: positive for dyspepsia, reflux symptoms, constipation and abdominal pain Genitourinary: positive for frequency and nocturia Musculoskeletal: positive for myalgias, stiff joints, neck pain, back pain and muscle weakness Neurological: positive for headaches, dizziness, vertigo, memory problems, paresthesia, coordination problems, gait problems and weakness Behvioral/Psych: positive for anxiety and depression Endocrine: positive for thyroid problems Vitals:  
 10/24/17 1520 BP: 110/72 Pulse: 81 SpO2: 96% Weight: 94 lb (42.6 kg) Height: 4' 10\" (1.473 m) Objective: I 
 
 
NEUROLOGICAL EXAM: 
 
 Appearance: The patient is thinly developed and nourished, provides a coherent history and is in no acute distress. Mental Status: Oriented to time, place and person and the president. Fund of knowledge and cognitive functions seems normal but she is slow in response. Speech is fluent without aphasia or dysarthria. Mood and affect anxious and depressed. Cranial Nerves:   Intact visual fields. Fundi are benign. DEMETRIO, EOM's full, no nystagmus, no ptosis. Facial sensation is normal. Corneal reflexes are not tested. Facial movement is symmetric. Hearing is normal bilaterally. Palate is midline with normal sternocleidomastoid and trapezius muscles are normal. Tongue is midline. Neck supple without meningismus or bruits Temporal arteries not tender or enlarged J areas are not tender Motor:  5/5 strength in upper and lower proximal and distal muscles. Normal bulk and tone. No fasciculations. No percussion myotonia, or easy cramping Reflexes:   Deep tendon reflexes 2+/4 and symmetrical. 
No babinski or clonus present Sensory:   Normal to touch, pinprick and vibration and temperature and DSS is intact. Gait:  Normal gait. Tremor:   No tremor noted. Cerebellar:  No cerebellar signs present. Neurovascular:  Normal heart sounds and regular rhythm, peripheral pulses intact, and no carotid bruits. Assessment: ICD-10-CM ICD-9-CM 1. 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  
   IMMUNOELECTROPHORESIS (IMMUNOFIX.) ANTINEURONAL CELL AB  
   CK SED RATE (ESR) NEURO EEG 24 HR   
   XR SPINE LUMB 2 OR 3 V  
   diazePAM (VALIUM) 5 mg tablet 2. Hereditary and idiopathic peripheral neuropathy G60.9 356.9 GLIADIN ABS, IGA AND IGG  
   IMMUNOELECTROPHORESIS (IMMUNOFIX.) ANTINEURONAL CELL AB  
   CK SED RATE (ESR) NEURO EEG 24 HR   
   XR SPINE LUMB 2 OR 3 V  
   diazePAM (VALIUM) 5 mg tablet 3. Weakness generalized R53.1 780.79 GLIADIN ABS, IGA AND IGG  
   IMMUNOELECTROPHORESIS (IMMUNOFIX.) ANTINEURONAL CELL AB  
   CK SED RATE (ESR) NEURO EEG 24 HR   
   XR SPINE LUMB 2 OR 3 V  
   diazePAM (VALIUM) 5 mg tablet 4. Memory loss R41.3 780.93 GLIADIN ABS, IGA AND IGG  
   IMMUNOELECTROPHORESIS (IMMUNOFIX.) ANTINEURONAL CELL AB  
   CK SED RATE (ESR) NEURO EEG 24 HR   
   XR SPINE LUMB 2 OR 3 V  
   diazePAM (VALIUM) 5 mg tablet 5. Complex febrile convulsion (HCC) R56.01 780.32 GLIADIN ABS, IGA AND IGG  
   IMMUNOELECTROPHORESIS (IMMUNOFIX.) ANTINEURONAL CELL AB  
   CK SED RATE (ESR) NEURO EEG 24 HR   
   XR SPINE LUMB 2 OR 3 V  
   diazePAM (VALIUM) 5 mg tablet 6. Back pain of lumbosacaral region with sciatica M54.40 724.2 GLIADIN ABS, IGA AND IGG  
  724.3 IMMUNOELECTROPHORESIS (IMMUNOFIX.) ANTINEURONAL CELL AB  
   CK SED RATE (ESR) NEURO EEG 24 HR   
   XR SPINE LUMB 2 OR 3 V  
   diazePAM (VALIUM) 5 mg tablet 7. Cervical neck pain with evidence of disc disease M50.90 722.91 GLIADIN ABS, IGA AND IGG  
   IMMUNOELECTROPHORESIS (IMMUNOFIX.) ANTINEURONAL CELL AB  
   CK SED RATE (ESR) NEURO EEG 24 HR   
   XR SPINE LUMB 2 OR 3 V  
   diazePAM (VALIUM) 5 mg tablet 8. Muscle spasms of both lower extremities M62.838 728.85 GLIADIN ABS, IGA AND IGG  
   IMMUNOELECTROPHORESIS (IMMUNOFIX.) ANTINEURONAL CELL AB  
   CK SED RATE (ESR) NEURO EEG 24 HR   
   XR SPINE LUMB 2 OR 3 V  
   diazePAM (VALIUM) 5 mg tablet 9. Diplopia H53.2 368.2 GLIADIN ABS, IGA AND IGG  
   IMMUNOELECTROPHORESIS (IMMUNOFIX.) ANTINEURONAL CELL AB  
   CK SED RATE (ESR) NEURO EEG 24 HR   
   XR SPINE LUMB 2 OR 3 V  
   diazePAM (VALIUM) 5 mg tablet Plan: We will check metabolic studies, and she will get her thyroid antibodies rechecked by Dr. Patricia Thayer in 1 month. We will check a 24 hour EEG in view of all of her twitches and cramps and muscle spasms and eye twitches and sharp lancinating pains Patient is to get a back x-ray to make sure she does not have a lumbar radiculopathy If all else fails we may need to repeat an EMG. Patient will try magnesium for her muscle cramps, she does not want neuropsych testing for memory loss because she feels she is stable at this time and her  concurs We will recheck her metabolic parameters looking for other causes of her muscle cramps Carotid Doppler exam 1 1/2 year ago was normal 
We will repeat some of her blood tests and Dr. Rose Lim her endocrinologist will repeat her thyroid tests and TPO antigen Patient has unusual constellation of neurological symptoms, seemingly involving both peripheral nervous system and neuromuscular disease and some autonomic symptoms in addition, that is slowly progressive and seems to be getting worse. They're associated with severe muscle spasms. No obvious abnormality on examination to explain her symptoms. Patient refuses muscle biopsy, and [facility] has no answers She will continue her current treatentment She did have a normal EMG study also plus normal metabolic panel and CPK Some of her symptoms may certainly be exacerbated by stress and some of her complaints seem to be functional symptoms  level Difficult case, 35 minutes spent with patient today Followup to be arranged after the above-mentioned evaluation Signed By: Amy Randolph MD   
 October 24, 2017 This note will not be viewable in [address] If you have questions, please do not hesitate to call me. I look forward to following Ms. Anders along with you. Sincerely, Amy Randolph MD

## 2017-10-24 NOTE — LETTER
10/24/2017 8:56 PM 
 
Patient:  Mandy Hoskins YOB: 1960 Date of Visit: 10/24/2017 Dear No Recipients: Thank you for referring Ms. Mandy Hoskins to me for evaluation/treatment. Below are the relevant portions of my assessment and plan of care. Consult Subjective:  
 
Mandy Hoskins is a 61 y.o. right-handed  female seen for evaluation of new problem of increasing severe pain in her right leg that then began to radiate up the whole leg from the heel and has been worsening with back pain ever since at the referral of Dr. Aimee Horton. She felt like she stepped on a sharp nail, but there was no nail present, and the pain went up her whole leg associated with a burning and sharp pain at them and up into her back. Is gone into the left leg some now seems to go into her arms and legs intermittently. Her bowel and bladder function remain stable. She has some chronic back pain, and that has not really gotten all that much worse. She supposedly has some known degenerative disease of the lumbar spine, but I do not see any x-rays are chart. She complains that her eyes are difficult to open and they seem to be squinted closed in the middle the night, and she cannot see, her vision comes and goes, she has twitches and spasms at all her muscles intermittently. She had an episode where she suddenly seemed to pass out, and does not remember anything about it or if she lost consciousness for sure. She complains of muscle spasms all over her body still in her hands cramping with carpal pedal spasms etc.  Valium 5 mg 3 times a day for the muscle spasms, that the only medicine that works, and she is trying magnesium oxide. She is to continue her vitamin D and multivitamins every day.   She had trouble with memory loss, but neither her nor her  thinks her memory has gotten any worse, and she never got her neuropsych testing, but does not think she needs it at this time and does not want to get rescheduled for it. She had previous MRI of the brain one year ago that just showed 2 microhemorrhages but no other lesions. She has had normal B12 and thyroid functions. She is also seen for episodes of dizziness and muscle spasms and myoclonic jerks and muscle tightness and spasticity. She has severe muscle spasms, headaches, numbness and weakness, double vision, vertigo and undiagnosed neurological disease. Patient had complete metabolic workup done that was all normal, except for an TPO antigen of 312 tested 2 months ago, and patient is followed by endocrine. . Patient referred a [facility] who did an EMG that was normal and saw no evidence of stiff man syndrome. Her voltage-gated potassium calcium channel antibodies were negative, as was the RUSTAM 65 antibody test for stiff man syndrome. Ramiro Alcala Dr had a negative paraneoplastic panel and workup, and recommended a muscle biopsy but told the patient the yield was 10% or less, and she elected not to do it. Patient has a five-year history of progressive difficulty with muscle spasms, that occur all or her body, including even her tongue, with severe painful muscle spasms that will intact any part of her body including her back, arms and legs, and neck and her tongue. 5 years ago she had the sudden onset of double vision and vertigo with severe headache and dizziness and painful muscle spasms and numbness all over her arms and legs. She has these episodes intermittently for the last 4 years with the most severe problem being the muscle spasms. She had extensive evaluation by her previous neurologist Dr. 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 She was diagnosed with Hashimoto's thyroiditis within an anti-TPO antibody titer of about 500 several years ago. Her repeat titer was 312 a month ago.  Her thyroid hormone test has remained stable and normal since. She did have a spinal tab because of the severe headache and muscle spasms that was normal except for a protein of 64, and a normal multiple sclerosis panel, and lab work including CPK in the distant past have been normal. She finds that Valium 5 mg 3 times a day, seemed to help her spasms, and she's been on baclofen 10 mg 3 times a day without success and was on Neurontin 100 mg 3 times a day, but stopped that 2 because it did not seem to help. She has no family history of similar disease. She does feel generally weak. Her bowel and bladder function may be involved also because she did have small bowel obstruction a year or 2 ago of unclear cause. She has no fever or rashes. Nothing in particular seems to bring on her muscle spasms, and don't seem to be related to exercise, eating, or any other clear precipitating factors. Normal EEG over a year ago, but no overnight monitoring. On complete review of systems and symptoms she has Hashimoto's thyroiditis, hysterectomy, small bowel obstruction, syncopal episodes, fatigue and muscle weakness and muscle pain, palpitations and shortness of breath and memory loss and nausea and vomiting and tinnitus and dizziness and difficulty swallowing and blurred or double vision. Past Medical History:  
Diagnosis Date  Hashimoto disease Jan 2012  
 causing hyperthyroidism Dr. Martha Penn  Kidney stones  Salivary gland stone  Small bowel obstruction 3/30/2014  Stiff person syndrome 2010 Dr. Pau Werner neurologist  
  
Past Surgical History:  
Procedure Laterality Date  APPENDECTOMY  HX HYSTERECTOMY  HX LEFT SALPINGO-OOPHORECTOMY Family History Problem Relation Age of Onset  Diabetes Other   
  maternal great aunt  Dementia Mother  Heart Disease Father  Thyroid Disease Neg Hx Social History Substance Use Topics  Smoking status: Never Smoker  Smokeless tobacco: Never Used  Alcohol use Yes Comment: on holidays Current Outpatient Prescriptions Medication Sig Dispense Refill  diazePAM (VALIUM) 5 mg tablet TAKE 1 TABLET BY MOUTH THREE TIMES DAILY AS NEEDED FOR MUSCLE SPASMS 90 Tab 5  clobetasol (TEMOVATE) 0.05 % ointment Apply  to affected area two (2) times a day.  selenium 200 mcg cap Take  by mouth two (2) times a day.  b complex vitamins tablet Take 1 Tab by mouth as needed.  cholecalciferol (VITAMIN D3) 1,000 unit tablet Take 2,000 Units by mouth daily.  omega-3 fatty acids-vitamin e (FISH OIL) 1,000 mg cap Take 1-2 Caps by mouth every other day.  magnesium oxide (MAG-OX) 400 mg tablet Take 400 mg by mouth daily. Allergies Allergen Reactions  Other Food Hives Solu medrol  Methimazole Rash Review of Systems: A comprehensive review of systems was negative except for: Constitutional: positive for fatigue and malaise Eyes: positive for visual disturbance Ears, nose, mouth, throat, and face: positive for tinnitus and earaches Respiratory: positive for cough, pleurisy/chest pain, wheezing or dyspnea on exertion Cardiovascular: positive for chest pressure/discomfort, irregular heart beats, near-syncope, syncope, exertional chest pressure/discomfort Gastrointestinal: positive for dyspepsia, reflux symptoms, constipation and abdominal pain Genitourinary: positive for frequency and nocturia Musculoskeletal: positive for myalgias, stiff joints, neck pain, back pain and muscle weakness Neurological: positive for headaches, dizziness, vertigo, memory problems, paresthesia, coordination problems, gait problems and weakness Behvioral/Psych: positive for anxiety and depression Endocrine: positive for thyroid problems Vitals:  
 10/24/17 1520 BP: 110/72 Pulse: 81 SpO2: 96% Weight: 94 lb (42.6 kg) Height: 4' 10\" (1.473 m) Objective: I 
 
 
NEUROLOGICAL EXAM: 
 
 Appearance: The patient is thinly developed and nourished, provides a coherent history and is in no acute distress. Mental Status: Oriented to time, place and person and the president. Fund of knowledge and cognitive functions seems normal but she is slow in response. Speech is fluent without aphasia or dysarthria. Mood and affect anxious and depressed. Cranial Nerves:   Intact visual fields. Fundi are benign. DEMETRIO, EOM's full, no nystagmus, no ptosis. Facial sensation is normal. Corneal reflexes are not tested. Facial movement is symmetric. Hearing is normal bilaterally. Palate is midline with normal sternocleidomastoid and trapezius muscles are normal. Tongue is midline. Neck supple without meningismus or bruits Temporal arteries not tender or enlarged J areas are not tender Motor:  5/5 strength in upper and lower proximal and distal muscles. Normal bulk and tone. No fasciculations. No percussion myotonia, or easy cramping Reflexes:   Deep tendon reflexes 2+/4 and symmetrical. 
No babinski or clonus present Sensory:   Normal to touch, pinprick and vibration and temperature and DSS is intact. Gait:  Normal gait. Tremor:   No tremor noted. Cerebellar:  No cerebellar signs present. Neurovascular:  Normal heart sounds and regular rhythm, peripheral pulses intact, and no carotid bruits. Assessment: ICD-10-CM ICD-9-CM 1. 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  
   IMMUNOELECTROPHORESIS (IMMUNOFIX.) ANTINEURONAL CELL AB  
   CK SED RATE (ESR) NEURO EEG 24 HR   
   XR SPINE LUMB 2 OR 3 V  
   diazePAM (VALIUM) 5 mg tablet 2. Hereditary and idiopathic peripheral neuropathy G60.9 356.9 GLIADIN ABS, IGA AND IGG  
   IMMUNOELECTROPHORESIS (IMMUNOFIX.) ANTINEURONAL CELL AB  
   CK SED RATE (ESR) NEURO EEG 24 HR   
   XR SPINE LUMB 2 OR 3 V  
   diazePAM (VALIUM) 5 mg tablet 3. Weakness generalized R53.1 780.79 GLIADIN ABS, IGA AND IGG  
   IMMUNOELECTROPHORESIS (IMMUNOFIX.) ANTINEURONAL CELL AB  
   CK SED RATE (ESR) NEURO EEG 24 HR   
   XR SPINE LUMB 2 OR 3 V  
   diazePAM (VALIUM) 5 mg tablet 4. Memory loss R41.3 780.93 GLIADIN ABS, IGA AND IGG  
   IMMUNOELECTROPHORESIS (IMMUNOFIX.) ANTINEURONAL CELL AB  
   CK SED RATE (ESR) NEURO EEG 24 HR   
   XR SPINE LUMB 2 OR 3 V  
   diazePAM (VALIUM) 5 mg tablet 5. Complex febrile convulsion (HCC) R56.01 780.32 GLIADIN ABS, IGA AND IGG  
   IMMUNOELECTROPHORESIS (IMMUNOFIX.) ANTINEURONAL CELL AB  
   CK SED RATE (ESR) NEURO EEG 24 HR   
   XR SPINE LUMB 2 OR 3 V  
   diazePAM (VALIUM) 5 mg tablet 6. Back pain of lumbosacaral region with sciatica M54.40 724.2 GLIADIN ABS, IGA AND IGG  
  724.3 IMMUNOELECTROPHORESIS (IMMUNOFIX.) ANTINEURONAL CELL AB  
   CK SED RATE (ESR) NEURO EEG 24 HR   
   XR SPINE LUMB 2 OR 3 V  
   diazePAM (VALIUM) 5 mg tablet 7. Cervical neck pain with evidence of disc disease M50.90 722.91 GLIADIN ABS, IGA AND IGG  
   IMMUNOELECTROPHORESIS (IMMUNOFIX.) ANTINEURONAL CELL AB  
   CK SED RATE (ESR) NEURO EEG 24 HR   
   XR SPINE LUMB 2 OR 3 V  
   diazePAM (VALIUM) 5 mg tablet 8. Muscle spasms of both lower extremities M62.838 728.85 GLIADIN ABS, IGA AND IGG  
   IMMUNOELECTROPHORESIS (IMMUNOFIX.) ANTINEURONAL CELL AB  
   CK SED RATE (ESR) NEURO EEG 24 HR   
   XR SPINE LUMB 2 OR 3 V  
   diazePAM (VALIUM) 5 mg tablet 9. Diplopia H53.2 368.2 GLIADIN ABS, IGA AND IGG  
   IMMUNOELECTROPHORESIS (IMMUNOFIX.) ANTINEURONAL CELL AB  
   CK SED RATE (ESR) NEURO EEG 24 HR   
   XR SPINE LUMB 2 OR 3 V  
   diazePAM (VALIUM) 5 mg tablet Plan: We will check metabolic studies, and she will get her thyroid antibodies rechecked by Dr. Peyton Davila in 1 month. We will check a 24 hour EEG in view of all of her twitches and cramps and muscle spasms and eye twitches and sharp lancinating pains Patient is to get a back x-ray to make sure she does not have a lumbar radiculopathy If all else fails we may need to repeat an EMG. Patient will try magnesium for her muscle cramps, she does not want neuropsych testing for memory loss because she feels she is stable at this time and her  concurs We will recheck her metabolic parameters looking for other causes of her muscle cramps Carotid Doppler exam 1 1/2 year ago was normal 
We will repeat some of her blood tests and Dr. Ezekiel iDop her endocrinologist will repeat her thyroid tests and TPO antigen Patient has unusual constellation of neurological symptoms, seemingly involving both peripheral nervous system and neuromuscular disease and some autonomic symptoms in addition, that is slowly progressive and seems to be getting worse. They're associated with severe muscle spasms. No obvious abnormality on examination to explain her symptoms. Patient refuses muscle biopsy, and [facility] has no answers She will continue her current treatentment She did have a normal EMG study also plus normal metabolic panel and CPK Some of her symptoms may certainly be exacerbated by stress and some of her complaints seem to be functional symptoms  level Difficult case, 35 minutes spent with patient today Followup to be arranged after the above-mentioned evaluation Signed By: Shaheen Farias MD   
 October 24, 2017 This note will not be viewable in [address] If you have questions, please do not hesitate to call me. I look forward to following Ms. Anders along with you. Sincerely, Shaheen Farias MD

## 2017-10-25 NOTE — PROGRESS NOTES
Consult    Subjective:     Luis Thrasher is a 61 y.o. right-handed  female seen for evaluation of new problem of increasing severe pain in her right leg that then began to radiate up the whole leg from the heel and has been worsening with back pain ever since at the referral of Dr. Ivy Ring. She felt like she stepped on a sharp nail, but there was no nail present, and the pain went up her whole leg associated with a burning and sharp pain at them and up into her back. Is gone into the left leg some now seems to go into her arms and legs intermittently. Her bowel and bladder function remain stable. She has some chronic back pain, and that has not really gotten all that much worse. She supposedly has some known degenerative disease of the lumbar spine, but I do not see any x-rays are chart. She complains that her eyes are difficult to open and they seem to be squinted closed in the middle the night, and she cannot see, her vision comes and goes, she has twitches and spasms at all her muscles intermittently. She had an episode where she suddenly seemed to pass out, and does not remember anything about it or if she lost consciousness for sure. She complains of muscle spasms all over her body still in her hands cramping with carpal pedal spasms etc.  Valium 5 mg 3 times a day for the muscle spasms, that the only medicine that works, and she is trying magnesium oxide. She is to continue her vitamin D and multivitamins every day. She had trouble with memory loss, but neither her nor her  thinks her memory has gotten any worse, and she never got her neuropsych testing, but does not think she needs it at this time and does not want to get rescheduled for it. She had previous MRI of the brain one year ago that just showed 2 microhemorrhages but no other lesions. She has had normal B12 and thyroid functions.  She is also seen for episodes of dizziness and muscle spasms and myoclonic jerks and muscle tightness and spasticity. She has severe muscle spasms, headaches, numbness and weakness, double vision, vertigo and undiagnosed neurological disease. Patient had complete metabolic workup done that was all normal, except for an TPO antigen of 312 tested 2 months ago, and patient is followed by endocrine. . Patient referred a [facility] who did an EMG that was normal and saw no evidence of stiff man syndrome. Her voltage-gated potassium calcium channel antibodies were negative, as was the RUSTAM 65 antibody test for stiff man syndrome. Ramiro Alcala Dr had a negative paraneoplastic panel and workup, and recommended a muscle biopsy but told the patient the yield was 10% or less, and she elected not to do it. Patient has a five-year history of progressive difficulty with muscle spasms, that occur all or her body, including even her tongue, with severe painful muscle spasms that will intact any part of her body including her back, arms and legs, and neck and her tongue. 5 years ago she had the sudden onset of double vision and vertigo with severe headache and dizziness and painful muscle spasms and numbness all over her arms and legs. She has these episodes intermittently for the last 4 years with the most severe problem being the muscle spasms. She had extensive evaluation by her previous neurologist Dr. 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 She was diagnosed with Hashimoto's thyroiditis within an anti-TPO antibody titer of about 500 several years ago. Her repeat titer was 312 a month ago. Her thyroid hormone test has remained stable and normal since.  She did have a spinal tab because of the severe headache and muscle spasms that was normal except for a protein of 64, and a normal multiple sclerosis panel, and lab work including CPK in the distant past have been normal. She finds that Valium 5 mg 3 times a day, seemed to help her spasms, and she's been on baclofen 10 mg 3 times a day without success and was on Neurontin 100 mg 3 times a day, but stopped that 2 because it did not seem to help. She has no family history of similar disease. She does feel generally weak. Her bowel and bladder function may be involved also because she did have small bowel obstruction a year or 2 ago of unclear cause. She has no fever or rashes. Nothing in particular seems to bring on her muscle spasms, and don't seem to be related to exercise, eating, or any other clear precipitating factors. Normal EEG over a year ago, but no overnight monitoring. On complete review of systems and symptoms she has Hashimoto's thyroiditis, hysterectomy, small bowel obstruction, syncopal episodes, fatigue and muscle weakness and muscle pain, palpitations and shortness of breath and memory loss and nausea and vomiting and tinnitus and dizziness and difficulty swallowing and blurred or double vision. Past Medical History:   Diagnosis Date    Hashimoto disease Jan 2012    causing hyperthyroidism Dr. Stanley Munguia    Kidney stones     Salivary gland stone     Small bowel obstruction 3/30/2014    Stiff person syndrome 2010    Dr. Jamal Ge neurologist      Past Surgical History:   Procedure Laterality Date    APPENDECTOMY      HX HYSTERECTOMY      HX LEFT SALPINGO-OOPHORECTOMY       Family History   Problem Relation Age of Onset    Diabetes Other      maternal great aunt    Dementia Mother     Heart Disease Father     Thyroid Disease Neg Hx       Social History   Substance Use Topics    Smoking status: Never Smoker    Smokeless tobacco: Never Used    Alcohol use Yes      Comment: on holidays       Current Outpatient Prescriptions   Medication Sig Dispense Refill    diazePAM (VALIUM) 5 mg tablet TAKE 1 TABLET BY MOUTH THREE TIMES DAILY AS NEEDED FOR MUSCLE SPASMS 90 Tab 5    clobetasol (TEMOVATE) 0.05 % ointment Apply  to affected area two (2) times a day.       selenium 200 mcg cap Take  by mouth two (2) times a day.  b complex vitamins tablet Take 1 Tab by mouth as needed.  cholecalciferol (VITAMIN D3) 1,000 unit tablet Take 2,000 Units by mouth daily.  omega-3 fatty acids-vitamin e (FISH OIL) 1,000 mg cap Take 1-2 Caps by mouth every other day.  magnesium oxide (MAG-OX) 400 mg tablet Take 400 mg by mouth daily. Allergies   Allergen Reactions    Other Food Hives     Solu medrol     Methimazole Rash        Review of Systems:  A comprehensive review of systems was negative except for: Constitutional: positive for fatigue and malaise  Eyes: positive for visual disturbance  Ears, nose, mouth, throat, and face: positive for tinnitus and earaches  Respiratory: positive for cough, pleurisy/chest pain, wheezing or dyspnea on exertion  Cardiovascular: positive for chest pressure/discomfort, irregular heart beats, near-syncope, syncope, exertional chest pressure/discomfort  Gastrointestinal: positive for dyspepsia, reflux symptoms, constipation and abdominal pain  Genitourinary: positive for frequency and nocturia  Musculoskeletal: positive for myalgias, stiff joints, neck pain, back pain and muscle weakness  Neurological: positive for headaches, dizziness, vertigo, memory problems, paresthesia, coordination problems, gait problems and weakness  Behvioral/Psych: positive for anxiety and depression  Endocrine: positive for thyroid problems   Vitals:    10/24/17 1520   BP: 110/72   Pulse: 81   SpO2: 96%   Weight: 94 lb (42.6 kg)   Height: 4' 10\" (1.473 m)     Objective:     I      NEUROLOGICAL EXAM:    Appearance: The patient is thinly developed and nourished, provides a coherent history and is in no acute distress. Mental Status: Oriented to time, place and person and the president. Fund of knowledge and cognitive functions seems normal but she is slow in response. Speech is fluent without aphasia or dysarthria. Mood and affect anxious and depressed.    Cranial Nerves: Intact visual fields. Fundi are benign. DEMETRIO, EOM's full, no nystagmus, no ptosis. Facial sensation is normal. Corneal reflexes are not tested. Facial movement is symmetric. Hearing is normal bilaterally. Palate is midline with normal sternocleidomastoid and trapezius muscles are normal. Tongue is midline. Neck supple without meningismus or bruits  Temporal arteries not tender or enlarged  J areas are not tender   Motor:  5/5 strength in upper and lower proximal and distal muscles. Normal bulk and tone. No fasciculations. No percussion myotonia, or easy cramping   Reflexes:   Deep tendon reflexes 2+/4 and symmetrical.  No babinski or clonus present   Sensory:   Normal to touch, pinprick and vibration and temperature and DSS is intact. Gait:  Normal gait. Tremor:   No tremor noted. Cerebellar:  No cerebellar signs present. Neurovascular:  Normal heart sounds and regular rhythm, peripheral pulses intact, and no carotid bruits. Assessment:       ICD-10-CM ICD-9-CM    1. 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 Hereditary and idiopathic peripheral neuropathy G60.9 356.9 GLIADIN ABS, IGA AND IGG      IMMUNOELECTROPHORESIS (IMMUNOFIX.)      ANTINEURONAL CELL AB      CK      SED RATE (ESR)      NEURO EEG 24 HR       XR SPINE LUMB 2 OR 3 V      diazePAM (VALIUM) 5 mg tablet   3. Weakness generalized R53.1 780.79 GLIADIN ABS, IGA AND IGG      IMMUNOELECTROPHORESIS (IMMUNOFIX.)      ANTINEURONAL CELL AB      CK      SED RATE (ESR)      NEURO EEG 24 HR       XR SPINE LUMB 2 OR 3 V      diazePAM (VALIUM) 5 mg tablet   4.  Memory loss R41.3 780.93 GLIADIN ABS, IGA AND IGG      IMMUNOELECTROPHORESIS (IMMUNOFIX.)      ANTINEURONAL CELL AB      CK      SED RATE (ESR)      NEURO EEG 24 HR       XR SPINE LUMB 2 OR 3 V      diazePAM (VALIUM) 5 mg tablet   5. Complex febrile convulsion (HCC) R56.01 780.32 GLIADIN ABS, IGA AND IGG      IMMUNOELECTROPHORESIS (IMMUNOFIX.)      ANTINEURONAL CELL AB      CK      SED RATE (ESR)      NEURO EEG 24 HR       XR SPINE LUMB 2 OR 3 V      diazePAM (VALIUM) 5 mg tablet   6. Back pain of lumbosacaral region with sciatica M54.40 724.2 GLIADIN ABS, IGA AND IGG     724.3 IMMUNOELECTROPHORESIS (IMMUNOFIX.)      ANTINEURONAL CELL AB      CK      SED RATE (ESR)      NEURO EEG 24 HR       XR SPINE LUMB 2 OR 3 V      diazePAM (VALIUM) 5 mg tablet   7. Cervical neck pain with evidence of disc disease M50.90 722.91 GLIADIN ABS, IGA AND IGG      IMMUNOELECTROPHORESIS (IMMUNOFIX.)      ANTINEURONAL CELL AB      CK      SED RATE (ESR)      NEURO EEG 24 HR       XR SPINE LUMB 2 OR 3 V      diazePAM (VALIUM) 5 mg tablet   8. Muscle spasms of both lower extremities M62.838 728.85 GLIADIN ABS, IGA AND IGG      IMMUNOELECTROPHORESIS (IMMUNOFIX.)      ANTINEURONAL CELL AB      CK      SED RATE (ESR)      NEURO EEG 24 HR       XR SPINE LUMB 2 OR 3 V      diazePAM (VALIUM) 5 mg tablet   9. Diplopia H53.2 368.2 GLIADIN ABS, IGA AND IGG      IMMUNOELECTROPHORESIS (IMMUNOFIX.)      ANTINEURONAL CELL AB      CK      SED RATE (ESR)      NEURO EEG 24 HR       XR SPINE LUMB 2 OR 3 V      diazePAM (VALIUM) 5 mg tablet         Plan: We will check metabolic studies, and she will get her thyroid antibodies rechecked by Dr. Naty Lilly in 1 month. We will check a 24 hour EEG in view of all of her twitches and cramps and muscle spasms and eye twitches and sharp lancinating pains  Patient is to get a back x-ray to make sure she does not have a lumbar radiculopathy  If all else fails we may need to repeat an EMG.   Patient will try magnesium for her muscle cramps, she does not want neuropsych testing for memory loss because she feels she is stable at this time and her  concurs  We will recheck her metabolic parameters looking for other causes of her muscle cramps  Carotid Doppler exam 1 1/2 year ago was normal  We will repeat some of her blood tests and Dr. Prema Burger her endocrinologist will repeat her thyroid tests and TPO antigen  Patient has unusual constellation of neurological symptoms, seemingly involving both peripheral nervous system and neuromuscular disease and some autonomic symptoms in addition, that is slowly progressive and seems to be getting worse. They're associated with severe muscle spasms. No obvious abnormality on examination to explain her symptoms. Patient refuses muscle biopsy, and [facility] has no answers  She will continue her current treatentment  She did have a normal EMG study also plus normal metabolic panel and CPK  Some of her symptoms may certainly be exacerbated by stress and some of her complaints seem to be functional symptoms  level  Difficult case, 35 minutes spent with patient today  Followup to be arranged after the above-mentioned evaluation    Signed By: Sergio Dumas MD     October 24, 2017         This note will not be viewable in [address]

## 2017-10-27 ENCOUNTER — TELEPHONE (OUTPATIENT)
Dept: NEUROLOGY | Age: 57
End: 2017-10-27

## 2017-10-27 NOTE — TELEPHONE ENCOUNTER
----- Message from Nora Arredondo sent at 10/27/2017  8:08 AM EDT -----  Regarding: Dr. Tj Montgomery  Pt's  stated an EEG was ordered for the pt on Tuesday, but the pt has [employer] and would like to know if the order could be changed to a [facility] hosp, because insurance is not accepted by [facility]. Best contact number [phone].

## 2017-10-30 NOTE — TELEPHONE ENCOUNTER
Called and notified them to call central scheduling for [facility] to set up with our order. Left message of phone number to central scheduling to set up 24 hour EEG.  Asked him to have them contact us if there is any help needed for the authorization

## 2017-11-15 ENCOUNTER — TELEPHONE (OUTPATIENT)
Dept: NEUROLOGY | Age: 57
End: 2017-11-15

## 2017-11-15 NOTE — TELEPHONE ENCOUNTER
States that she did the 24 hour EEG at [facility].  Advised her to get this faxed to us as she got this done 11/7/17  She will call back a few days after she calls to get it faxed to us

## 2017-11-20 ENCOUNTER — TELEPHONE (OUTPATIENT)
Dept: NEUROLOGY | Age: 57
End: 2017-11-20

## 2017-11-20 NOTE — TELEPHONE ENCOUNTER
I spoke with the patient. Need to request results from [facility]  Fax: [phone]  She wants to wait on the lab until January because her insurance will cover better then. Advised I would send the fax and asked her to call back to see if we received the results in a week.  She acknowledged understanding

## 2017-11-20 NOTE — TELEPHONE ENCOUNTER
----- Message from Maximo Sotelo sent at 11/20/2017  2:49 PM EST -----  Regarding: /Telephone  Pt wanted to get test results. Best contact number is [phone].

## 2017-12-28 LAB
25(OH)D3+25(OH)D2 SERPL-MCNC: 46.6 NG/ML (ref 30–100)
THYROPEROXIDASE AB SERPL-ACNC: 324 IU/ML (ref 0–34)
TSH SERPL DL<=0.005 MIU/L-ACNC: 1.15 UIU/ML (ref 0.45–4.5)

## 2018-01-04 LAB
ANTI NEURONAL CELL AB, 811986: 17 UNITS (ref 0–54)
CK SERPL-CCNC: 91 U/L (ref 24–173)
ERYTHROCYTE [SEDIMENTATION RATE] IN BLOOD BY WESTERGREN METHOD: 11 MM/HR (ref 0–40)
GLIADIN PEPTIDE IGA SER-ACNC: 5 UNITS (ref 0–19)
GLIADIN PEPTIDE IGG SER-ACNC: 2 UNITS (ref 0–19)
IGA SERPL-MCNC: 220 MG/DL (ref 87–352)
IGG SERPL-MCNC: 899 MG/DL (ref 700–1600)
IGM SERPL-MCNC: 89 MG/DL (ref 26–217)
INTERPRETATION, 811987: NORMAL
PROT PATTERN SERPL IFE-IMP: NORMAL

## 2018-02-09 ENCOUNTER — TELEPHONE (OUTPATIENT)
Dept: ENDOCRINOLOGY | Age: 58
End: 2018-02-09

## 2018-02-09 NOTE — TELEPHONE ENCOUNTER
Please call her back and see if she has any particular questions but as I wrote in her letter, everything is still normal and it's fine to just see her back in June now that she has rescheduled.

## 2018-02-09 NOTE — TELEPHONE ENCOUNTER
I spoke with the patient and notified her of the information. She has all of her questions answered. She wants to know if she will need to get labs done before the June appointment.   Please advise

## 2018-02-09 NOTE — TELEPHONE ENCOUNTER
----- Message from Reather Diver sent at 2/9/2018 10:05 AM EST -----  Regarding: /Telephone  Pt requested to make  aware of why she cancelled her 1/30/18 appt due to her being ill, requested call back from nurse to discuss blood work     [phone]

## 2018-04-16 ENCOUNTER — OFFICE VISIT (OUTPATIENT)
Dept: NEUROLOGY | Age: 58
End: 2018-04-16

## 2018-04-16 VITALS
WEIGHT: 93 LBS | HEIGHT: 58 IN | HEART RATE: 73 BPM | BODY MASS INDEX: 19.52 KG/M2 | SYSTOLIC BLOOD PRESSURE: 120 MMHG | OXYGEN SATURATION: 96 % | DIASTOLIC BLOOD PRESSURE: 76 MMHG

## 2018-04-16 DIAGNOSIS — G25.82 STIFF-MAN SYNDROME: ICD-10-CM

## 2018-04-16 DIAGNOSIS — R41.3 MEMORY LOSS: ICD-10-CM

## 2018-04-16 DIAGNOSIS — G60.9 HEREDITARY AND IDIOPATHIC PERIPHERAL NEUROPATHY: ICD-10-CM

## 2018-04-16 DIAGNOSIS — R53.1 WEAKNESS GENERALIZED: ICD-10-CM

## 2018-04-16 DIAGNOSIS — H53.2 DIPLOPIA: ICD-10-CM

## 2018-04-16 DIAGNOSIS — M50.90 CERVICAL NECK PAIN WITH EVIDENCE OF DISC DISEASE: ICD-10-CM

## 2018-04-16 DIAGNOSIS — M54.40 BACK PAIN OF LUMBOSACARAL REGION WITH SCIATICA: Primary | ICD-10-CM

## 2018-04-16 DIAGNOSIS — M62.838 MUSCLE SPASMS OF BOTH LOWER EXTREMITIES: ICD-10-CM

## 2018-04-16 DIAGNOSIS — R56.01 COMPLEX FEBRILE CONVULSION (HCC): ICD-10-CM

## 2018-04-16 RX ORDER — DIAZEPAM 5 MG/1
TABLET ORAL
Qty: 90 TAB | Refills: 5 | Status: SHIPPED | OUTPATIENT
Start: 2018-04-16

## 2018-04-16 NOTE — LETTER
4/16/2018 10:18 PM 
 
Patient:  Kvng Aguilar YOB: 1960 Date of Visit: 4/16/2018 Dear No Recipients: Thank you for referring Ms. Kvng Aguilar to me for evaluation/treatment. Below are the relevant portions of my assessment and plan of care. Consult Subjective:  
 
Kvng Aguilar is a 61 y.o. right-handed  female seen for evaluation of problem of increasing muscle spasms and cramps seem to be getting worse, and the patient stopped her magnesium so we asked her to get back on the magnesium. We will also check metabolic parameters to rule out any other metabolic cause of her symptoms. She had an EEG study done that was 24 hour study, that showed nothing abnormal and was normal.  Patient's recent repeat TPO antibody titer was stable just above 300. Patient has had severe reactions to steroids in the past, almost anaphylactic reaction, so we will not treated with that for possible Hashimoto's disease. They are not interested in immunosuppressive therapy either, and I am not sure really help much with all of her normal studies, because I think this antibody is probably an asymptomatic antibody. Her anti-neuronal antibodies were negative and her serum protein electrophoresis was normal and there was no other sign of autoimmune disease. She complains that her eyes are difficult to open and they seem to be squinted closed in the middle the night, and she cannot see, her vision comes and goes, she has twitches and spasms at all her muscles intermittently. She had an episode where she suddenly seemed to pass out, and does not remember anything about it or if she lost consciousness for sure.   She complains of muscle spasms all over her body still in her hands cramping with carpal pedal spasms etc. the only medication that helps is Valium 5 mg 3 times a day for the muscle spasms, that the only medicine that works, and she is going to try magnesium oxide again we talked to her at length about possibly coming off the benzodiazepines, going back on Neurontin, but she is leery about that, and despite 5-10 minutes of talking to her and her  they insist on staying on the Valium. . She is to continue her vitamin D and multivitamins every day. She had trouble with memory loss, but neither her nor her  thinks her memory has gotten any worse, and she never got her neuropsych testing, but does not think she needs it at this time and does not want to get rescheduled for it. She had previous MRI of the brain one year ago that just showed 2 microhemorrhages but no other lesions. She has had normal B12 and thyroid functions. She is also seen for episodes of dizziness and muscle spasms and myoclonic jerks and muscle tightness and spasticity. She has severe muscle spasms, headaches, numbness and weakness, double vision, vertigo and undiagnosed neurological disease. Patient had complete metabolic workup done that was all normal, except for an TPO antigen of 312 tested 2 months ago, and patient is followed by endocrine. . Patient referred a [facility] who did an EMG that was normal and saw no evidence of stiff man syndrome. Her voltage-gated potassium calcium channel antibodies were negative, as was the RUSTAM 65 antibody test for stiff man syndrome. Ramiro Alcala Dr had a negative paraneoplastic panel and workup, and recommended a muscle biopsy but told the patient the yield was 10% or less, and she elected not to do it. Patient has a five-year history of progressive difficulty with muscle spasms, that occur all or her body, including even her tongue, with severe painful muscle spasms that will intact any part of her body including her back, arms and legs, and neck and her tongue. 5 years ago she had the sudden onset of double vision and vertigo with severe headache and dizziness and painful muscle spasms and numbness all over her arms and legs.  She has these episodes intermittently for the last 4 years with the most severe problem being the muscle spasms. She had extensive evaluation by her previous neurologist Dr. 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 She was diagnosed with Hashimoto's thyroiditis within an anti-TPO antibody titer of about 500 several years ago. Her repeat titer was 312 a month ago. Her thyroid hormone test has remained stable and normal since. She did have a spinal tab because of the severe headache and muscle spasms that was normal except for a protein of 64, and a normal multiple sclerosis panel, and lab work including CPK in the distant past have been normal. She finds that Valium 5 mg 3 times a day, seemed to help her spasms, and she's been on baclofen 10 mg 3 times a day without success and was on Neurontin 100 mg 3 times a day, but stopped that 2 because it did not seem to help. She has no family history of similar disease. She does feel generally weak. Her bowel and bladder function may be involved also because she did have small bowel obstruction a year or 2 ago of unclear cause. She has no fever or rashes. Nothing in particular seems to bring on her muscle spasms, and don't seem to be related to exercise, eating, or any other clear precipitating factors. Normal EEG over a year ago, but no overnight monitoring. On complete review of systems and symptoms she has Hashimoto's thyroiditis, hysterectomy, small bowel obstruction, syncopal episodes, fatigue and muscle weakness and muscle pain, palpitations and shortness of breath and memory loss and nausea and vomiting and tinnitus and dizziness and difficulty swallowing and blurred or double vision. Past Medical History:  
Diagnosis Date  Hashimoto disease Jan 2012  
 causing hyperthyroidism Dr. Best Branch  Kidney stones  Salivary gland stone  Small bowel obstruction ([facility] [address] 3/30/2014  Stiff person syndrome 2010  Dr. Arely Patino neurologist  
  
 Past Surgical History:  
Procedure Laterality Date  APPENDECTOMY  HX HYSTERECTOMY  HX LEFT SALPINGO-OOPHORECTOMY Family History Problem Relation Age of Onset  Diabetes Other   
  maternal great aunt  Dementia Mother  Heart Disease Father  Thyroid Disease Neg Hx Social History Substance Use Topics  Smoking status: Never Smoker  Smokeless tobacco: Never Used  Alcohol use Yes Comment: on holidays Current Outpatient Prescriptions Medication Sig Dispense Refill  diazePAM (VALIUM) 5 mg tablet TAKE 1 TABLET BY MOUTH THREE TIMES DAILY AS NEEDED FOR MUSCLE SPASMS 90 Tab 5  clobetasol (TEMOVATE) 0.05 % ointment Apply  to affected area two (2) times a day.  magnesium oxide (MAG-OX) 400 mg tablet Take 400 mg by mouth daily.  selenium 200 mcg cap Take  by mouth two (2) times a day.  b complex vitamins tablet Take 1 Tab by mouth as needed.  cholecalciferol (VITAMIN D3) 1,000 unit tablet Take 2,000 Units by mouth daily.  omega-3 fatty acids-vitamin e (FISH OIL) 1,000 mg cap Take 1-2 Caps by mouth every other day. Allergies Allergen Reactions  Other Food Hives Solu medrol  Methimazole Rash Review of Systems: A comprehensive review of systems was negative except for: Constitutional: positive for fatigue and malaise Eyes: positive for visual disturbance Ears, nose, mouth, throat, and face: positive for tinnitus and earaches Respiratory: positive for cough, pleurisy/chest pain, wheezing or dyspnea on exertion Cardiovascular: positive for chest pressure/discomfort, irregular heart beats, near-syncope, syncope, exertional chest pressure/discomfort Gastrointestinal: positive for dyspepsia, reflux symptoms, constipation and abdominal pain Genitourinary: positive for frequency and nocturia Musculoskeletal: positive for myalgias, stiff joints, neck pain, back pain and muscle weakness Neurological: positive for headaches, dizziness, vertigo, memory problems, paresthesia, coordination problems, gait problems and weakness Behvioral/Psych: positive for anxiety and depression Endocrine: positive for thyroid problems Vitals:  
 04/16/18 1625 BP: 120/76 Pulse: 73 SpO2: 96% Weight: 93 lb (42.2 kg) Height: 4' 10\" (1.473 m) Objective: I 
 
 
NEUROLOGICAL EXAM: 
 
Appearance: The patient is thinly developed and nourished, provides a coherent history and is in no acute distress. Mental Status: Oriented to time, place and person and the president. Fund of knowledge and cognitive functions seems normal but she is slow in response. Speech is fluent without aphasia or dysarthria. Mood and affect anxious and depressed. Cranial Nerves:   Intact visual fields. Fundi are benign. DEMETRIO, EOM's full, no nystagmus, no ptosis. Facial sensation is normal. Corneal reflexes are not tested. Facial movement is symmetric. Hearing is normal bilaterally. Palate is midline with normal sternocleidomastoid and trapezius muscles are normal. Tongue is midline. Neck supple without meningismus or bruits Temporal arteries not tender or enlarged J areas are not tender Motor:  5/5 strength in upper and lower proximal and distal muscles. Normal bulk and tone. No fasciculations. No percussion myotonia, or easy cramping Reflexes:   Deep tendon reflexes 2+/4 and symmetrical. 
No babinski or clonus present Sensory:   Normal to touch, pinprick and vibration and temperature and DSS is intact. Gait:  Normal gait. Tremor:   No tremor noted. Cerebellar:  No cerebellar signs present. Neurovascular:  Normal heart sounds and regular rhythm, peripheral pulses decreased, and no carotid bruits. Assessment: ICD-10-CM ICD-9-CM 1. Back pain of lumbosacaral region with sciatica M54.40 724.2 CK  
  724.3 CBC WITH AUTOMATED DIFF  
   METABOLIC PANEL, COMPREHENSIVE    MAGNESIUM  
 diazePAM (VALIUM) 5 mg tablet 2. Complex febrile convulsion (HCC) R56.01 780.32 CK CBC WITH AUTOMATED DIFF  
   METABOLIC PANEL, COMPREHENSIVE MAGNESIUM  
   diazePAM (VALIUM) 5 mg tablet 3. Memory loss R41.3 780.93 CK CBC WITH AUTOMATED DIFF  
   METABOLIC PANEL, COMPREHENSIVE MAGNESIUM  
   diazePAM (VALIUM) 5 mg tablet 4. Hereditary and idiopathic peripheral neuropathy G60.9 356.9 CK  
   CBC WITH AUTOMATED DIFF  
   METABOLIC PANEL, COMPREHENSIVE MAGNESIUM  
   diazePAM (VALIUM) 5 mg tablet 5. 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  
   METABOLIC PANEL, COMPREHENSIVE MAGNESIUM  
   diazePAM (VALIUM) 5 mg tablet 6. Weakness generalized R53.1 780.79 CK CBC WITH AUTOMATED DIFF  
   METABOLIC PANEL, COMPREHENSIVE MAGNESIUM  
   diazePAM (VALIUM) 5 mg tablet 7. Cervical neck pain with evidence of disc disease M50.90 722.91 CK CBC WITH AUTOMATED DIFF  
   METABOLIC PANEL, COMPREHENSIVE MAGNESIUM  
   diazePAM (VALIUM) 5 mg tablet 8. Muscle spasms of both lower extremities M62.838 728.85 diazePAM (VALIUM) 5 mg tablet 9. Diplopia H53.2 368.2 diazePAM (VALIUM) 5 mg tablet Plan: We will check metabolic studies, and she is to try the magnesium oxide for her spasms again and her Valium was renewed because she insisted that the only thing that helps her, and does not want to try Neurontin again or any other muscle relaxant 24 hour EEG was normal after last visit Patient had lumbar spine x-rays done that just showed mild degenerative disease only If all else fails we may need to repeat an EMG, but she does not really want that. Artist Baudilio Patient does not want neuropsych testing for memory loss because she feels she is stable at this time and her  concurs We will recheck her metabolic parameters looking for other causes of her muscle cramps Carotid Doppler exam 1 1/2 year ago was normal 
 We will repeat some of her blood tests and Dr. Kendrick Bowden her endocrinologist will follow her thyroid tests and TPO antigen Vision had serious allergic reaction to steroids, does not want steroids again or any immunosuppressive therapy for her elevated TPO antibody Patient has unusual constellation of neurological symptoms, seemingly involving both peripheral nervous system and neuromuscular disease and some autonomic symptoms in addition, that is slowly progressive and seems to be getting worse. They're associated with severe muscle spasms. No obvious abnormality on examination to explain her symptoms. Patient refuses muscle biopsy, and [facility] has no answers She will continue her current treatentment She did have a normal EMG study also plus normal metabolic panel and CPK Some of her symptoms may certainly be exacerbated by stress and some of her complaints seem to be functional symptoms  level Difficult case, 35 minutes spent with patient today Followup to be arranged after the above-mentioned evaluation Signed By: Olvin Walker MD   
 April 16, 2018 This note will not be viewable in [address] If you have questions, please do not hesitate to call me. I look forward to following Ms. Anders along with you. Sincerely, Olvin Walker MD

## 2018-04-16 NOTE — PATIENT INSTRUCTIONS

## 2018-04-16 NOTE — LETTER
4/16/2018 10:16 PM 
 
Patient:  Vidya Edwards YOB: 1960 Date of Visit: 4/16/2018 Dear No Recipients: Thank you for referring Ms. Vidya Edwards to me for evaluation/treatment. Below are the relevant portions of my assessment and plan of care. Consult Subjective:  
 
Vidya Edwards is a 61 y.o. right-handed  female seen for evaluation of problem of increasing muscle spasms and cramps seem to be getting worse, and the patient stopped her magnesium so we asked her to get back on the magnesium. We will also check metabolic parameters to rule out any other metabolic cause of her symptoms. She had an EEG study done that was 24 hour study, that showed nothing abnormal and was normal.  Patient's recent repeat TPO antibody titer was stable just above 300. Patient has had severe reactions to steroids in the past, almost anaphylactic reaction, so we will not treated with that for possible Hashimoto's disease. They are not interested in immunosuppressive therapy either, and I am not sure really help much with all of her normal studies, because I think this antibody is probably an asymptomatic antibody. Her anti-neuronal antibodies were negative and her serum protein electrophoresis was normal and there was no other sign of autoimmune disease. She complains that her eyes are difficult to open and they seem to be squinted closed in the middle the night, and she cannot see, her vision comes and goes, she has twitches and spasms at all her muscles intermittently. She had an episode where she suddenly seemed to pass out, and does not remember anything about it or if she lost consciousness for sure.   She complains of muscle spasms all over her body still in her hands cramping with carpal pedal spasms etc. the only medication that helps is Valium 5 mg 3 times a day for the muscle spasms, that the only medicine that works, and she is going to try magnesium oxide again we talked to her at length about possibly coming off the benzodiazepines, going back on Neurontin, but she is leery about that, and despite 5-10 minutes of talking to her and her  they insist on staying on the Valium. . She is to continue her vitamin D and multivitamins every day. She had trouble with memory loss, but neither her nor her  thinks her memory has gotten any worse, and she never got her neuropsych testing, but does not think she needs it at this time and does not want to get rescheduled for it. She had previous MRI of the brain one year ago that just showed 2 microhemorrhages but no other lesions. She has had normal B12 and thyroid functions. She is also seen for episodes of dizziness and muscle spasms and myoclonic jerks and muscle tightness and spasticity. She has severe muscle spasms, headaches, numbness and weakness, double vision, vertigo and undiagnosed neurological disease. Patient had complete metabolic workup done that was all normal, except for an TPO antigen of 312 tested 2 months ago, and patient is followed by endocrine. . Patient referred a [facility] who did an EMG that was normal and saw no evidence of stiff man syndrome. Her voltage-gated potassium calcium channel antibodies were negative, as was the RUSTAM 65 antibody test for stiff man syndrome. Ramiro Alcala Dr had a negative paraneoplastic panel and workup, and recommended a muscle biopsy but told the patient the yield was 10% or less, and she elected not to do it. Patient has a five-year history of progressive difficulty with muscle spasms, that occur all or her body, including even her tongue, with severe painful muscle spasms that will intact any part of her body including her back, arms and legs, and neck and her tongue. 5 years ago she had the sudden onset of double vision and vertigo with severe headache and dizziness and painful muscle spasms and numbness all over her arms and legs.  She has these episodes intermittently for the last 4 years with the most severe problem being the muscle spasms. She had extensive evaluation by her previous neurologist Dr. 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 She was diagnosed with Hashimoto's thyroiditis within an anti-TPO antibody titer of about 500 several years ago. Her repeat titer was 312 a month ago. Her thyroid hormone test has remained stable and normal since. She did have a spinal tab because of the severe headache and muscle spasms that was normal except for a protein of 64, and a normal multiple sclerosis panel, and lab work including CPK in the distant past have been normal. She finds that Valium 5 mg 3 times a day, seemed to help her spasms, and she's been on baclofen 10 mg 3 times a day without success and was on Neurontin 100 mg 3 times a day, but stopped that 2 because it did not seem to help. She has no family history of similar disease. She does feel generally weak. Her bowel and bladder function may be involved also because she did have small bowel obstruction a year or 2 ago of unclear cause. She has no fever or rashes. Nothing in particular seems to bring on her muscle spasms, and don't seem to be related to exercise, eating, or any other clear precipitating factors. Normal EEG over a year ago, but no overnight monitoring. On complete review of systems and symptoms she has Hashimoto's thyroiditis, hysterectomy, small bowel obstruction, syncopal episodes, fatigue and muscle weakness and muscle pain, palpitations and shortness of breath and memory loss and nausea and vomiting and tinnitus and dizziness and difficulty swallowing and blurred or double vision. Past Medical History:  
Diagnosis Date  Hashimoto disease Jan 2012  
 causing hyperthyroidism Dr. Elie Arnold  Kidney stones  Salivary gland stone  Small bowel obstruction ([facility] [address] 3/30/2014  Stiff person syndrome 2010  Dr. Jacobson Me neurologist  
  
 Past Surgical History:  
Procedure Laterality Date  APPENDECTOMY  HX HYSTERECTOMY  HX LEFT SALPINGO-OOPHORECTOMY Family History Problem Relation Age of Onset  Diabetes Other   
  maternal great aunt  Dementia Mother  Heart Disease Father  Thyroid Disease Neg Hx Social History Substance Use Topics  Smoking status: Never Smoker  Smokeless tobacco: Never Used  Alcohol use Yes Comment: on holidays Current Outpatient Prescriptions Medication Sig Dispense Refill  diazePAM (VALIUM) 5 mg tablet TAKE 1 TABLET BY MOUTH THREE TIMES DAILY AS NEEDED FOR MUSCLE SPASMS 90 Tab 5  clobetasol (TEMOVATE) 0.05 % ointment Apply  to affected area two (2) times a day.  magnesium oxide (MAG-OX) 400 mg tablet Take 400 mg by mouth daily.  selenium 200 mcg cap Take  by mouth two (2) times a day.  b complex vitamins tablet Take 1 Tab by mouth as needed.  cholecalciferol (VITAMIN D3) 1,000 unit tablet Take 2,000 Units by mouth daily.  omega-3 fatty acids-vitamin e (FISH OIL) 1,000 mg cap Take 1-2 Caps by mouth every other day. Allergies Allergen Reactions  Other Food Hives Solu medrol  Methimazole Rash Review of Systems: A comprehensive review of systems was negative except for: Constitutional: positive for fatigue and malaise Eyes: positive for visual disturbance Ears, nose, mouth, throat, and face: positive for tinnitus and earaches Respiratory: positive for cough, pleurisy/chest pain, wheezing or dyspnea on exertion Cardiovascular: positive for chest pressure/discomfort, irregular heart beats, near-syncope, syncope, exertional chest pressure/discomfort Gastrointestinal: positive for dyspepsia, reflux symptoms, constipation and abdominal pain Genitourinary: positive for frequency and nocturia Musculoskeletal: positive for myalgias, stiff joints, neck pain, back pain and muscle weakness Neurological: positive for headaches, dizziness, vertigo, memory problems, paresthesia, coordination problems, gait problems and weakness Behvioral/Psych: positive for anxiety and depression Endocrine: positive for thyroid problems Vitals:  
 04/16/18 1625 BP: 120/76 Pulse: 73 SpO2: 96% Weight: 93 lb (42.2 kg) Height: 4' 10\" (1.473 m) Objective: I 
 
 
NEUROLOGICAL EXAM: 
 
Appearance: The patient is thinly developed and nourished, provides a coherent history and is in no acute distress. Mental Status: Oriented to time, place and person and the president. Fund of knowledge and cognitive functions seems normal but she is slow in response. Speech is fluent without aphasia or dysarthria. Mood and affect anxious and depressed. Cranial Nerves:   Intact visual fields. Fundi are benign. DEMETRIO, EOM's full, no nystagmus, no ptosis. Facial sensation is normal. Corneal reflexes are not tested. Facial movement is symmetric. Hearing is normal bilaterally. Palate is midline with normal sternocleidomastoid and trapezius muscles are normal. Tongue is midline. Neck supple without meningismus or bruits Temporal arteries not tender or enlarged J areas are not tender Motor:  5/5 strength in upper and lower proximal and distal muscles. Normal bulk and tone. No fasciculations. No percussion myotonia, or easy cramping Reflexes:   Deep tendon reflexes 2+/4 and symmetrical. 
No babinski or clonus present Sensory:   Normal to touch, pinprick and vibration and temperature and DSS is intact. Gait:  Normal gait. Tremor:   No tremor noted. Cerebellar:  No cerebellar signs present. Neurovascular:  Normal heart sounds and regular rhythm, peripheral pulses decreased, and no carotid bruits. Assessment: ICD-10-CM ICD-9-CM 1. Back pain of lumbosacaral region with sciatica M54.40 724.2 CK  
  724.3 CBC WITH AUTOMATED DIFF  
   METABOLIC PANEL, COMPREHENSIVE    MAGNESIUM  
 diazePAM (VALIUM) 5 mg tablet 2. Complex febrile convulsion (HCC) R56.01 780.32 CK CBC WITH AUTOMATED DIFF  
   METABOLIC PANEL, COMPREHENSIVE MAGNESIUM  
   diazePAM (VALIUM) 5 mg tablet 3. Memory loss R41.3 780.93 CK CBC WITH AUTOMATED DIFF  
   METABOLIC PANEL, COMPREHENSIVE MAGNESIUM  
   diazePAM (VALIUM) 5 mg tablet 4. Hereditary and idiopathic peripheral neuropathy G60.9 356.9 CK  
   CBC WITH AUTOMATED DIFF  
   METABOLIC PANEL, COMPREHENSIVE MAGNESIUM  
   diazePAM (VALIUM) 5 mg tablet 5. 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  
   METABOLIC PANEL, COMPREHENSIVE MAGNESIUM  
   diazePAM (VALIUM) 5 mg tablet 6. Weakness generalized R53.1 780.79 CK CBC WITH AUTOMATED DIFF  
   METABOLIC PANEL, COMPREHENSIVE MAGNESIUM  
   diazePAM (VALIUM) 5 mg tablet 7. Cervical neck pain with evidence of disc disease M50.90 722.91 CK CBC WITH AUTOMATED DIFF  
   METABOLIC PANEL, COMPREHENSIVE MAGNESIUM  
   diazePAM (VALIUM) 5 mg tablet 8. Muscle spasms of both lower extremities M62.838 728.85 diazePAM (VALIUM) 5 mg tablet 9. Diplopia H53.2 368.2 diazePAM (VALIUM) 5 mg tablet Plan: We will check metabolic studies, and she is to try the magnesium oxide for her spasms again and her Valium was renewed because she insisted that the only thing that helps her, and does not want to try Neurontin again or any other muscle relaxant 24 hour EEG was normal after last visit Patient had lumbar spine x-rays done that just showed mild degenerative disease only If all else fails we may need to repeat an EMG, but she does not really want that. [place] Patient does not want neuropsych testing for memory loss because she feels she is stable at this time and her  concurs We will recheck her metabolic parameters looking for other causes of her muscle cramps Carotid Doppler exam 1 1/2 year ago was normal 
 We will repeat some of her blood tests and Dr. Renny Belcher her endocrinologist will follow her thyroid tests and TPO antigen Vision had serious allergic reaction to steroids, does not want steroids again or any immunosuppressive therapy for her elevated TPO antibody Patient has unusual constellation of neurological symptoms, seemingly involving both peripheral nervous system and neuromuscular disease and some autonomic symptoms in addition, that is slowly progressive and seems to be getting worse. They're associated with severe muscle spasms. No obvious abnormality on examination to explain her symptoms. Patient refuses muscle biopsy, and [facility] has no answers She will continue her current treatentment She did have a normal EMG study also plus normal metabolic panel and CPK Some of her symptoms may certainly be exacerbated by stress and some of her complaints seem to be functional symptoms  level Difficult case, 35 minutes spent with patient today Followup to be arranged after the above-mentioned evaluation Signed By: Severo Frederic, MD   
 April 16, 2018 This note will not be viewable in [address] If you have questions, please do not hesitate to call me. I look forward to following Ms. Anders along with you. Sincerely, Severo Frederic, MD

## 2018-04-16 NOTE — MR AVS SNAPSHOT
[address], 
[id number], [address] [facility]. 
[phone] Patient: Kvng Aguilar MRN: [id number] VCT:9/74/3091 Visit Information Date & Time Provider Department Dept. Phone Encounter #  
 4/16/2018  3:40 PM Miracle Maldonado MD [facility] [phone] [id number] Follow-up Instructions Return in about 6 months (around 10/16/2018). Your Appointments 6/29/2018 10:30 AM  
Follow Up with MD Adalberto Flanagan [facility]) Appt Note: f/u      thyroid              radha from 01/12/2018   due to flu; R/s kt 2/9/18  
 [address] [facility] [address] [id number] [address] [place] [address]  
  
    
 1/9/2019  2:00 PM  
Follow Up with Miracle Maldonado MD  
Neurology [facility] at [facility]) Appt Note: f/u memory, jrb 4/16/18  
 [address] [facility], 
[address] [place] [address], [address] [id number]  
[address] [facility], [address] [place] [address], [address] [place] [address] St [address] [address] Upcoming Health Maintenance Date Due Hepatitis C Screening 1960 DTaP/Tdap/Td series (1 - Tdap) 8/28/1980 PAP AKA CERVICAL CYTOLOGY 8/28/1980 BREAST CANCER SCRN MAMMOGRAM 8/28/2009 FOBT Q 1 YEAR AGE 50-75 8/28/2009 Influenza Age 5 to Adult 8/1/2017 Allergies as of 4/16/2018  Review Complete On: 4/16/2018 By: Miracle Maldonado MD  
  
 Severity Noted Reaction Type Reactions Other Food  01/06/2015    Hives Solu medrol Methimazole  02/27/2012    Rash Current Immunizations  Reviewed on 11/7/2014 No immunizations on file. Not reviewed this visit You Were Diagnosed With   
  
 Codes Comments Back pain of lumbosacaral region with sciatica    -  Primary ICD-10-CM: M54.40 ICD-9-CM: 724.2, 724.3 Complex febrile convulsion (HCC)     ICD-10-CM: R56.01 
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   
 Muscle spasms of both lower extremities     ICD-10-CM: T21.590 ICD-9-CM: 728.85 Diplopia     ICD-10-CM: H53.2 ICD-9-CM: 368. 2 Vitals BP Pulse Height(growth percentile) Weight(growth percentile) SpO2 BMI  
 120/76 73 4' 10\" (1.473 m) 93 lb (42.2 kg) 96% 19.44 kg/m2 OB Status Smoking Status Postmenopausal Never Smoker BMI and BSA Data Body Mass Index Body Surface Area  
 19.44 kg/m 2 1.31 m 2 Preferred Pharmacy Pharmacy Name Phone [address] [phone] Your Updated Medication List  
  
   
This list is accurate as of 4/16/18  4:49 PM.  Always use your most recent med list.  
  
  
  
  
 b complex vitamins tablet Take 1 Tab by mouth as needed. clobetasol 0.05 % ointment Commonly known as:  Lotus Conception Apply  to affected area two (2) times a day. diazePAM 5 mg tablet Commonly known as:  VALIUM  
TAKE 1 TABLET BY MOUTH THREE TIMES DAILY AS NEEDED FOR MUSCLE SPASMS FISH OIL 1,000 mg Cap Generic drug:  omega-3 fatty acids-vitamin e Take 1-2 Caps by mouth every other day. magnesium oxide 400 mg tablet Commonly known as:  MAG-OX Take 400 mg by mouth daily. selenium 200 mcg Cap Take  by mouth two (2) times a day. VITAMIN D3 1,000 unit tablet Generic drug:  cholecalciferol Take 2,000 Units by mouth daily. Prescriptions Printed  Refills  
 diazePAM (VALIUM) 5 mg tablet 5  
 Sig: TAKE 1 TABLET BY MOUTH THREE TIMES DAILY AS NEEDED FOR MUSCLE SPASMS Class: Print We Performed the Following CBC WITH AUTOMATED DIFF [40572 CPT(R)] CK [id number] CPT(R)] MAGNESIUM [id number] CPT(R)] METABOLIC PANEL, COMPREHENSIVE [19491 CPT(R)] Follow-up Instructions Return in about 6 months (around 10/16/2018). Patient Instructions A Healthy Lifestyle: Care Instructions Your Care Instructions A healthy lifestyle can help you feel good, stay at a healthy weight, and have plenty of energy for both work and play. A healthy lifestyle is something you can share with your whole family. A healthy lifestyle also can lower your risk for serious health problems, such as high blood pressure, heart disease, and diabetes. You can follow a few steps listed below to improve your health and the health of your family. Follow-up care is a key part of your treatment and safety. Be sure to make and go to all appointments, and call your doctor if you are having problems. It's also a good idea to know your test results and keep a list of the medicines you take. How can you care for yourself at home? · Do not eat too much sugar, fat, or fast foods. You can still have dessert and treats now and then. The goal is moderation. · Start small to improve your eating habits. Pay attention to portion sizes, drink less juice and soda pop, and eat more fruits and vegetables. ¨ Eat a healthy amount of food. A 3-ounce serving of meat, for example, is about the size of a deck of cards. Fill the rest of your plate with vegetables and whole grains. ¨ Limit the amount of soda and sports drinks you have every day. Drink more water when you are thirsty. ¨ Eat at least 5 servings of fruits and vegetables every day. It may seem like a lot, but it is not hard to reach this goal. A serving or helping is 1 piece of fruit, 1 cup of vegetables, or 2 cups of leafy, raw vegetables. Have an apple or some carrot sticks as an afternoon snack instead of a candy bar. Try to have fruits and/or vegetables at every meal. 
· Make exercise part of your daily routine. You may want to start with simple activities, such as walking, bicycling, or slow swimming. Try to be active 30 to 60 minutes every day. You do not need to do all 30 to 60 minutes all at once. For example, you can exercise 3 times a day for 10 or 20 minutes. Moderate exercise is safe for most people, but it is always a good idea to talk to your doctor before starting an exercise program. 
· Keep moving. Sydnie Elder the lawn, work in the garden, or [employer]. Take the stairs instead of the elevator at work. · If you smoke, quit. People who smoke have an increased risk for heart attack, stroke, cancer, and other lung illnesses. Quitting is hard, but there are ways to boost your chance of quitting tobacco for good. ¨ Use nicotine gum, patches, or lozenges. ¨ Ask your doctor about stop-smoking programs and medicines. ¨ Keep trying. In addition to reducing your risk of diseases in the future, you will notice some benefits soon after you stop using tobacco. If you have shortness of breath or asthma symptoms, they will likely get better within a few weeks after you quit. · Limit how much alcohol you drink. Moderate amounts of alcohol (up to 2 drinks a day for men, 1 drink a day for women) are okay. But drinking too much can lead to liver problems, high blood pressure, and other health problems. Family health If you have a family, there are many things you can do together to improve your health. · Eat meals together as a family as often as possible. · Eat healthy foods. This includes fruits, vegetables, lean meats and dairy, and whole grains. · Include your family in your fitness plan.  Most people think of activities such as jogging or tennis as the way to fitness, but there are many ways you and your family can be more active. Anything that makes you breathe hard and gets your heart pumping is exercise. Here are some tips: 
¨ Walk to do errands or to take your child to school or the bus. ¨ Go for a family bike ride after dinner instead of watching TV. Where can you learn more? Go to http://gus-faye.info/. Enter Y689 in the search box to learn more about \"A Healthy Lifestyle: Care Instructions. \" Current as of: May 12, 2017 Content Version: 11.4 © 1461-8871 [employer]. Care instructions adapted under license by [employer] (which disclaims liability or warranty for this information). If you have questions about a medical condition or this instruction, always ask your healthcare professional. [address] any warranty or liability for your use of this information. Introducing \[facility]\"" & HEALTH SERVICES! Dear Darlyn Pagan: Thank you for requesting a [employer] account. Our records indicate that you already have an active [employer] account. You can access your account anytime at [email or web address] Did you know that you can access your hospital and ER discharge instructions at any time in [employer]? You can also review all of your test results from your hospital stay or ER visit. Additional Information If you have questions, please visit the Frequently Asked Questions section of the [employer] website at [email or web address] Remember, [employer] is NOT to be used for urgent needs. For medical emergencies, dial 911. Now available from your iPhone and Android! Please provide this summary of care documentation to your next provider. Your primary care clinician is listed as Karol Carmichael. If you have any questions after today's visit, please call [phone].

## 2018-04-17 NOTE — PROGRESS NOTES
Consult    Subjective:     Selena Morocho is a 61 y.o. right-handed  female seen for evaluation of problem of increasing muscle spasms and cramps seem to be getting worse, and the patient stopped her magnesium so we asked her to get back on the magnesium. We will also check metabolic parameters to rule out any other metabolic cause of her symptoms. She had an EEG study done that was 24 hour study, that showed nothing abnormal and was normal.  Patient's recent repeat TPO antibody titer was stable just above 300. Patient has had severe reactions to steroids in the past, almost anaphylactic reaction, so we will not treated with that for possible Hashimoto's disease. They are not interested in immunosuppressive therapy either, and I am not sure really help much with all of her normal studies, because I think this antibody is probably an asymptomatic antibody. Her anti-neuronal antibodies were negative and her serum protein electrophoresis was normal and there was no other sign of autoimmune disease. She complains that her eyes are difficult to open and they seem to be squinted closed in the middle the night, and she cannot see, her vision comes and goes, she has twitches and spasms at all her muscles intermittently. She had an episode where she suddenly seemed to pass out, and does not remember anything about it or if she lost consciousness for sure. She complains of muscle spasms all over her body still in her hands cramping with carpal pedal spasms etc. the only medication that helps is Valium 5 mg 3 times a day for the muscle spasms, that the only medicine that works, and she is going to try magnesium oxide again we talked to her at length about possibly coming off the benzodiazepines, going back on Neurontin, but she is leery about that, and despite 5-10 minutes of talking to her and her  they insist on staying on the Valium. . She is to continue her vitamin D and multivitamins every day.   She had trouble with memory loss, but neither her nor her  thinks her memory has gotten any worse, and she never got her neuropsych testing, but does not think she needs it at this time and does not want to get rescheduled for it. She had previous MRI of the brain one year ago that just showed 2 microhemorrhages but no other lesions. She has had normal B12 and thyroid functions. She is also seen for episodes of dizziness and muscle spasms and myoclonic jerks and muscle tightness and spasticity. She has severe muscle spasms, headaches, numbness and weakness, double vision, vertigo and undiagnosed neurological disease. Patient had complete metabolic workup done that was all normal, except for an TPO antigen of 312 tested 2 months ago, and patient is followed by endocrine. . Patient referred a [facility] who did an EMG that was normal and saw no evidence of stiff man syndrome. Her voltage-gated potassium calcium channel antibodies were negative, as was the RUSTAM 65 antibody test for stiff man syndrome. Ramiro Alcala Dr had a negative paraneoplastic panel and workup, and recommended a muscle biopsy but told the patient the yield was 10% or less, and she elected not to do it. Patient has a five-year history of progressive difficulty with muscle spasms, that occur all or her body, including even her tongue, with severe painful muscle spasms that will intact any part of her body including her back, arms and legs, and neck and her tongue. 5 years ago she had the sudden onset of double vision and vertigo with severe headache and dizziness and painful muscle spasms and numbness all over her arms and legs. She has these episodes intermittently for the last 4 years with the most severe problem being the muscle spasms. She had extensive evaluation by her previous neurologist Dr. 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  She was diagnosed with Hashimoto's thyroiditis within an anti-TPO antibody titer of about 500 several years ago. Her repeat titer was 312 a month ago. Her thyroid hormone test has remained stable and normal since. She did have a spinal tab because of the severe headache and muscle spasms that was normal except for a protein of 64, and a normal multiple sclerosis panel, and lab work including CPK in the distant past have been normal. She finds that Valium 5 mg 3 times a day, seemed to help her spasms, and she's been on baclofen 10 mg 3 times a day without success and was on Neurontin 100 mg 3 times a day, but stopped that 2 because it did not seem to help. She has no family history of similar disease. She does feel generally weak. Her bowel and bladder function may be involved also because she did have small bowel obstruction a year or 2 ago of unclear cause. She has no fever or rashes. Nothing in particular seems to bring on her muscle spasms, and don't seem to be related to exercise, eating, or any other clear precipitating factors. Normal EEG over a year ago, but no overnight monitoring. On complete review of systems and symptoms she has Hashimoto's thyroiditis, hysterectomy, small bowel obstruction, syncopal episodes, fatigue and muscle weakness and muscle pain, palpitations and shortness of breath and memory loss and nausea and vomiting and tinnitus and dizziness and difficulty swallowing and blurred or double vision.     Past Medical History:   Diagnosis Date    Hashimoto disease Jan 2012    causing hyperthyroidism Dr. Elie Arnold    Kidney stones     Salivary gland stone     Small bowel obstruction ([facility] [address] 3/30/2014    Stiff person syndrome 2010    Dr. Jacobson Me neurologist      Past Surgical History:   Procedure Laterality Date    APPENDECTOMY      HX HYSTERECTOMY      HX LEFT SALPINGO-OOPHORECTOMY       Family History   Problem Relation Age of Onset    Diabetes Other      maternal great aunt    Dementia Mother     Heart Disease Father    [facility] Thyroid Disease Neg Hx       Social History   Substance Use Topics    Smoking status: Never Smoker    Smokeless tobacco: Never Used    Alcohol use Yes      Comment: on holidays       Current Outpatient Prescriptions   Medication Sig Dispense Refill    diazePAM (VALIUM) 5 mg tablet TAKE 1 TABLET BY MOUTH THREE TIMES DAILY AS NEEDED FOR MUSCLE SPASMS 90 Tab 5    clobetasol (TEMOVATE) 0.05 % ointment Apply  to affected area two (2) times a day.  magnesium oxide (MAG-OX) 400 mg tablet Take 400 mg by mouth daily.  selenium 200 mcg cap Take  by mouth two (2) times a day.  b complex vitamins tablet Take 1 Tab by mouth as needed.  cholecalciferol (VITAMIN D3) 1,000 unit tablet Take 2,000 Units by mouth daily.  omega-3 fatty acids-vitamin e (FISH OIL) 1,000 mg cap Take 1-2 Caps by mouth every other day.           Allergies   Allergen Reactions    Other Food Hives     Solu medrol     Methimazole Rash        Review of Systems:  A comprehensive review of systems was negative except for: Constitutional: positive for fatigue and malaise  Eyes: positive for visual disturbance  Ears, nose, mouth, throat, and face: positive for tinnitus and earaches  Respiratory: positive for cough, pleurisy/chest pain, wheezing or dyspnea on exertion  Cardiovascular: positive for chest pressure/discomfort, irregular heart beats, near-syncope, syncope, exertional chest pressure/discomfort  Gastrointestinal: positive for dyspepsia, reflux symptoms, constipation and abdominal pain  Genitourinary: positive for frequency and nocturia  Musculoskeletal: positive for myalgias, stiff joints, neck pain, back pain and muscle weakness  Neurological: positive for headaches, dizziness, vertigo, memory problems, paresthesia, coordination problems, gait problems and weakness  Behvioral/Psych: positive for anxiety and depression  Endocrine: positive for thyroid problems   Vitals:    04/16/18 1625   BP: 120/76   Pulse: 73   SpO2: 96% Weight: 93 lb (42.2 kg)   Height: 4' 10\" (1.473 m)     Objective:     I      NEUROLOGICAL EXAM:    Appearance: The patient is thinly developed and nourished, provides a coherent history and is in no acute distress. Mental Status: Oriented to time, place and person and the president. Fund of knowledge and cognitive functions seems normal but she is slow in response. Speech is fluent without aphasia or dysarthria. Mood and affect anxious and depressed. Cranial Nerves:   Intact visual fields. Fundi are benign. DEMETRIO, EOM's full, no nystagmus, no ptosis. Facial sensation is normal. Corneal reflexes are not tested. Facial movement is symmetric. Hearing is normal bilaterally. Palate is midline with normal sternocleidomastoid and trapezius muscles are normal. Tongue is midline. Neck supple without meningismus or bruits  Temporal arteries not tender or enlarged  J areas are not tender   Motor:  5/5 strength in upper and lower proximal and distal muscles. Normal bulk and tone. No fasciculations. No percussion myotonia, or easy cramping   Reflexes:   Deep tendon reflexes 2+/4 and symmetrical.  No babinski or clonus present   Sensory:   Normal to touch, pinprick and vibration and temperature and DSS is intact. Gait:  Normal gait. Tremor:   No tremor noted. Cerebellar:  No cerebellar signs present. Neurovascular:  Normal heart sounds and regular rhythm, peripheral pulses decreased, and no carotid bruits. Assessment:       ICD-10-CM ICD-9-CM    1. Back pain of lumbosacaral region with sciatica M54.40 724.2 CK     724.3 CBC WITH AUTOMATED DIFF      METABOLIC PANEL, COMPREHENSIVE      MAGNESIUM      diazePAM (VALIUM) 5 mg tablet   2. Complex febrile convulsion (HCC) R56.01 780.32 CK      CBC WITH AUTOMATED DIFF      METABOLIC PANEL, COMPREHENSIVE      MAGNESIUM      diazePAM (VALIUM) 5 mg tablet   3.  Memory loss R41.3 780.93 CK      CBC WITH AUTOMATED DIFF      METABOLIC PANEL, COMPREHENSIVE MAGNESIUM      diazePAM (VALIUM) 5 mg tablet   4. Hereditary and idiopathic peripheral neuropathy G60.9 356.9 CK      CBC WITH AUTOMATED DIFF      METABOLIC PANEL, COMPREHENSIVE      MAGNESIUM      diazePAM (VALIUM) 5 mg tablet   5. 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 Weakness generalized R53.1 780.79 CK      CBC WITH AUTOMATED DIFF      METABOLIC PANEL, COMPREHENSIVE      MAGNESIUM      diazePAM (VALIUM) 5 mg tablet   7. Cervical neck pain with evidence of disc disease M50.90 722.91 CK      CBC WITH AUTOMATED DIFF      METABOLIC PANEL, COMPREHENSIVE      MAGNESIUM      diazePAM (VALIUM) 5 mg tablet   8. Muscle spasms of both lower extremities M62.838 728.85 diazePAM (VALIUM) 5 mg tablet   9. Diplopia H53.2 368.2 diazePAM (VALIUM) 5 mg tablet         Plan: We will check metabolic studies, and she is to try the magnesium oxide for her spasms again and her Valium was renewed because she insisted that the only thing that helps her, and does not want to try Neurontin again or any other muscle relaxant  24 hour EEG was normal after last visit  Patient had lumbar spine x-rays done that just showed mild degenerative disease only  If all else fails we may need to repeat an EMG, but she does not really want that. .  Patient does not want neuropsych testing for memory loss because she feels she is stable at this time and her  concurs  We will recheck her metabolic parameters looking for other causes of her muscle cramps  Carotid Doppler exam 1 1/2 year ago was normal  We will repeat some of her blood tests and Dr. Lieutenant Bell her endocrinologist will follow her thyroid tests and TPO antigen  Vision had serious allergic reaction to steroids, does not want steroids again or any immunosuppressive therapy for her elevated TPO antibody  Patient has unusual constellation of neurological symptoms, seemingly involving both peripheral nervous system and neuromuscular disease and some autonomic symptoms in addition, that is slowly progressive and seems to be getting worse. They're associated with severe muscle spasms. No obvious abnormality on examination to explain her symptoms. Patient refuses muscle biopsy, and [facility] has no answers  She will continue her current treatentment  She did have a normal EMG study also plus normal metabolic panel and CPK  Some of her symptoms may certainly be exacerbated by stress and some of her complaints seem to be functional symptoms  level  Difficult case, 35 minutes spent with patient today  Followup to be arranged after the above-mentioned evaluation    Signed By: Jerome Gentile MD     April 16, 2018         This note will not be viewable in [address]

## 2018-06-29 ENCOUNTER — OFFICE VISIT (OUTPATIENT)
Dept: ENDOCRINOLOGY | Age: 58
End: 2018-06-29

## 2018-06-29 VITALS
WEIGHT: 93 LBS | BODY MASS INDEX: 19.52 KG/M2 | DIASTOLIC BLOOD PRESSURE: 74 MMHG | SYSTOLIC BLOOD PRESSURE: 116 MMHG | HEIGHT: 58 IN | HEART RATE: 67 BPM

## 2018-06-29 DIAGNOSIS — E06.3 HASHIMOTO'S DISEASE: Primary | ICD-10-CM

## 2018-06-29 RX ORDER — LEVOTHYROXINE SODIUM 25 UG/1
TABLET ORAL
Qty: 14 TAB | Refills: 0 | Status: SHIPPED | COMMUNITY
Start: 2018-06-29

## 2018-06-29 NOTE — PROGRESS NOTES
Chief Complaint   Patient presents with    Thyroid Problem     pcp and pharmacy confirmed     History of Present Illness: Barbara Guerrier is a 61 y.o. female here for follow up of thyroid. Weight down 8 lbs since last visit in 6/17. Cancelled her visit in 1/18 as she had the flu and then injured her back but now has recovered from this. Did go back to 2 tabs of selenium per day but may skip a day 2 times a month at most.  Has been taking the magnesium daily. She has still been seeing Dr. Hali Vila and remains on valium for her neurologic symptoms. There are times when they are better and other times when they are worse with buzzing in her ears and vision problems and has to squint and cramping in the legs. Willing to give a trial of low dose unithroid to see if she feels better. Current Outpatient Prescriptions   Medication Sig    diazePAM (VALIUM) 5 mg tablet TAKE 1 TABLET BY MOUTH THREE TIMES DAILY AS NEEDED FOR MUSCLE SPASMS    magnesium oxide (MAG-OX) 400 mg tablet Take 400 mg by mouth daily.  selenium 200 mcg cap Take  by mouth two (2) times a day.  b complex vitamins tablet Take 1 Tab by mouth as needed.  cholecalciferol (VITAMIN D3) 1,000 unit tablet Take 2,000 Units by mouth daily.  omega-3 fatty acids-vitamin e (FISH OIL) 1,000 mg cap Take 1-2 Caps by mouth every other day. No current facility-administered medications for this visit. Allergies   Allergen Reactions    Other Food Hives     Solu medrol     Methimazole Rash     Review of Systems:  - Cardiovascular: no chest pain  - Neurological: (+) tremors  - Integumentary: skin is normal    Physical Examination:  Blood pressure 116/74, pulse 67, height 4' 10\" (1.473 m), weight 93 lb (42.2 kg).   - General: pleasant, no distress, good eye contact   - Neck: small goiter  - Cardiovascular: regular, normal rate, nl s1 and s2, no m/r/g   - Respiratory: clear to auscultation bilaterally   - Integumentary: skin is normal, no edema  - Neurological: reflexes 2+ at biceps, mild tremor  - Psychiatric: normal mood and affect    Data Reviewed:   Component      Latest Ref Rng & Units 12/27/2017 12/27/2017 12/27/2017          10:51 AM 10:51 AM 10:51 AM   TSH      0.450 - 4.500 uIU/mL   1.150   VITAMIN D, 25-HYDROXY      30.0 - 100.0 ng/mL  46.6    Thyroid peroxidase Ab      0 - 34 IU/mL 324 (H)         Assessment/Plan:     1. Hashimoto's disease she had several symptoms of hyperthyroidism and in Nov 2011 had her thyroid levels checked and her TSH was low at 0.03 and this was repeated 10 days later and it was a little higher but still low at 0.15 with a normal free T4 of 1.07 (0.58-1.6) and total T3 of 1.53 (0.87-1.78)  with a normal T3 and Free T4. On repeat in January 2012 her TSH was still low at 0.008 with a normal free T4 and total T3. Her TPO ab was elevated at 593 in 1/12 showing she has Hashimoto's disease but her TSH receptor ab was negative so she does not have Graves disease. She developed a rash with methimazole after 3 weeks of treatment so we stopped this and the rash went away. Her TSH level was higher at 0.376 in March 2012 than it was in January at 0.008 so it's possible she was switching from hyperthyroidism to hypothyroidism. In May her TSH was normal at 0.821 and again in July it was still normal at 0.594 and remained normal in Sept at 1.03 and Nov at 0.766 and Feb 2013 at 0.702 and May at 0.991 and August at 1.11 and 1.24 in 1/14 and 0.82 in 5/14 and 0.73 in 9/14 and 1.05 in 12/14. Repeat TPO ab still elevated at 398 in 8/14 but lower than in 2012. She does not appear to have either overactive or underactive thyroid for sure to explain any of her symptoms and have never given her any further treatment for her thyroid aside from the brief course of methimazole in 2012. She has had extensive evaluation at [facility] and by Dr. Kami Grewal and so far it's unclear exactly what her neurological condition is.  I have screened her for adrenal insufficiency and this was negative and her TREVOR, ANCA and RF were normal in 3/12. Since no other diagnosis could be found, we agreed to do a trial of low dose of tirosint 13 mcg daily to see if this could help with any of her symptoms when I saw her in 1/15 but she felt worse on this and stopped after 1 week. Her repeat TSH was 0.8 in 2/15 and TPO antibody was 332 at that time. Still 0.87 in 5/15 so remains euthyroid. Gave her a 6 month trial of selenium 200 mcg bid to see if this has any effect on her TPO ab level and any of her symptoms and although her TPO ab was down to 291 in 11/15 and TSH remained normal at 0.748, her symptoms remained the same so I highly doubt her Hashimoto's is the cause of her neuro symptoms and recommended to her to consider an opinion at another institution like [place] or [place] [address] if she still can't get any answers. TSH up to 1.56 and TPO ab 312 in 11/16 and offered her another trial of medication given her TSH is the highest it's been in 5 years but she wanted to hold on this for now and TSH down to 1.03 but TPO ab higher at 329 in 6/17. Still wanted to hold on medication but advised her to go back to bid dosing of selenium as she had only been taking once daily and did this and TSH 1.15 and TPO ab 324 in 12/17. We will do a 6 week trial of low dose unithroid 25 mcg 1/2 tab 3x/week for 6 weeks to see if she feels better on this. - begin unithroid 25 mcg 1/2 tab 3x/week  - cont selenium 200 mcg bid    - Check TSH and FT4 and TPO ab in 6 weeks and prior to next visit     2. Vitamin D deficiency: level was 28 in 11/14 on 1000 units daily.   Has been taking 2000 units daily since then and vitamin D was up to 40 in 2/15 and 39 in 5/15 and 43 in 11/15 and 38 in 6/17 and 46 in 12/17  - cont vitamin D 2000 units daily  - check Vitamin D 25-OH level in 2019    We spent 25 minutes of face to face time together and > 50% of the time was spent in counseling regarding management of all the conditions above. Patient Instructions   1) Please call [phone] to reset your [employer] password. 2) Give a trial of unithroid 1/2 of 25 mcg tablet just 3 days a week at bedtime. 3) Do this for the next 6 weeks and then repeat your labs and we'll see the effect of this dose. Follow-up Disposition:  Return in about 6 months (around 12/29/2018). Copy sent to:  Dr. Marnie Schwab Dr. Meda Morel via [facility]    Lab follow up: 8/22/18  Component      Latest Ref Rng & Units 8/20/2018 8/20/2018 8/20/2018          10:00 AM 10:00 AM 10:00 AM   T4, Free      0.82 - 1.77 ng/dL   1.08   TSH      0.450 - 4.500 uIU/mL  0.985    Thyroid peroxidase Ab      0 - 34 IU/mL 295 (H)       Sent her the following message through [employer]:  Your TSH is still normal at 0.98 but slightly lower than 1.15 at your last check (lower is better as you are less hypothyroid). Your TPO antibody level is 295 which is high but down from 324 at last check. Have you been taking the unithroid 25 mcg tabs 1/2 tab 3x/week at bedtime? If so, have you felt any better on this as we could try to increase the dose to 1/2 tab every night? Let me know when you have a chance.     Addendum: 1/3/19    She was unable to stay for her appointment today so sent her the following message in a letter:    Resulted Orders   T4, FREE   Result Value Ref Range    T4, Free 1.13 0.82 - 1.77 ng/dL    Narrative    Performed at:  [address]  [id number]  : Princess Khloe DEVINE, Phone:  [phone]   TSH 3RD GENERATION   Result Value Ref Range    TSH 1.280 0.450 - 4.500 uIU/mL    Narrative    Performed at:  [address]  [id number]  : Princess Khloe DEVINE, Phone:  [phone]   THYROID PEROXIDASE (TPO) AB   Result Value Ref Range    Thyroid peroxidase Ab 262 (H) 0 - 34 IU/mL    Narrative    Performed at:  [place] [address]  [address] [place] [address] [place] [address]  [id number]  : Raquel Rodriguez MD, Phone:  [phone]       I'm sorry I was running behind today and you couldn't stay for your visit. Your TSH and free T4 both remain normal and your thyroid antibody level is still high but down from 295 at last check. I never heard back from you about whether the unithroid helped or not so I'm assuming it didn't. Plan on taking the enclosed lab slip to repeat your labs again prior to your appointment that was rescheduled for July. Addendum: 11/12/19    I was again running behind and she couldn't stay for her visit. Sent her the following message in a letter:    Resulted Orders   T4, FREE (Collected: 11/7/2019  9:49 AM)   Result Value Ref Range    T4, Free 1.15 0.82 - 1.77 ng/dL    Narrative    Performed at:  [address]  [id number]  : Raquel Rodriguez MD, Phone:  [phone]   TSH 3RD GENERATION (Collected: 11/7/2019  9:49 AM)   Result Value Ref Range    TSH 1.140 0.450 - 4.500 uIU/mL    Narrative    Performed at:  [address]  [id number]  : Raquel Rodriguez MD, Phone:  [phone]   THYROID PEROXIDASE (TPO) AB (Collected: 11/7/2019  9:49 AM)   Result Value Ref Range    Thyroid peroxidase Ab 181 (H) 0 - 34 IU/mL    Narrative    Performed at:  [address]  [id number]  : Raquel Rodriguez MD, Phone:  [phone]       Your thyroid levels (TSH and free T4) remain normal and your thyroid antibody level for hashimoto's remains high at 181 but lower than last year when it was 262. I don't think you need to come back and see me any more in the future and can just have Dr. Bowie Hence check your levels once a year and if your TSH is ever out of the normal range, I'm happy to see you back in the future.

## 2018-06-29 NOTE — PATIENT INSTRUCTIONS
1) Please call [phone] to reset your [employer] password. 2) Give a trial of unithroid 1/2 of 25 mcg tablet just 3 days a week at bedtime. 3) Do this for the next 6 weeks and then repeat your labs and we'll see the effect of this dose.

## 2018-06-29 NOTE — MR AVS SNAPSHOT
[facility] [address] [facility] [address] [address] [phone] Patient: Aron Barbosa MRN: [id number] Visit Information Date & Time Provider Department Dept. Phone Encounter #  
 6/29/2018 10:30 AM Stuart Scott, [address] [place] [address] Diabetes and Endocrinology [id number] Follow-up Instructions Return in about 6 months (around 12/29/2018). Your Appointments 1/9/2019  2:00 PM  
Follow Up with Farrukh Szymanski MD  
Neurology [facility] at [facility] Appt Note: f/u memory, jrb 4/16/18  
 [facility], 
[address] [place] [address], [address] [address]  
[address], [address] [facility] [address] [address] Upcoming Health Maintenance Date Due Hepatitis C Screening 1960 DTaP/Tdap/Td series (1 - Tdap) 8/28/1980 PAP AKA CERVICAL CYTOLOGY 8/28/1980 BREAST CANCER SCRN MAMMOGRAM 8/28/2009 FOBT Q 1 YEAR AGE 50-75 8/28/2009 Influenza Age 5 to Adult 8/1/2018 Allergies as of 6/29/2018  Review Complete On: 6/29/2018 By: Stuart Scott MD  
  
 Severity Noted Reaction Type Reactions Other Food  01/06/2015    Hives Solu medrol Methimazole  02/27/2012    Rash Current Immunizations  Reviewed on 11/7/2014 No immunizations on file. Not reviewed this visit You Were Diagnosed With   
  
 Codes Comments Hashimoto's disease    -  Primary ICD-10-CM: E06.3 ICD-9-CM: 602. 2 Vitals BP Pulse Height(growth percentile) Weight(growth percentile) BMI OB Status 116/74 67 4' 10\" (1.473 m) 93 lb (42.2 kg) 19.44 kg/m2 Postmenopausal  
 Smoking Status Never Smoker Vitals History BMI and BSA Data Body Mass Index Body Surface Area  
 19.44 kg/m 2 1.31 m 2 Preferred Pharmacy Pharmacy Name Phone [address], [address] [facility] [place] [phone] Your Updated Medication List  
  
   
This list is accurate as of 6/29/18 10:48 AM.  Always use your most recent med list.  
  
  
  
  
 b complex vitamins tablet Take 1 Tab by mouth as needed. diazePAM 5 mg tablet Commonly known as:  VALIUM  
TAKE 1 TABLET BY MOUTH THREE TIMES DAILY AS NEEDED FOR MUSCLE SPASMS FISH OIL 1,000 mg Cap Generic drug:  omega-3 fatty acids-vitamin e Take 1-2 Caps by mouth every other day. magnesium oxide 400 mg tablet Commonly known as:  MAG-OX Take 400 mg by mouth daily. selenium 200 mcg Cap Take  by mouth two (2) times a day. UNITHROID 25 mcg tablet Generic drug:  levothyroxine Take 1/2 tab 3x/week at bedtime on Mon, Wed, and Fri night VITAMIN D3 1,000 unit tablet Generic drug:  cholecalciferol Take 2,000 Units by mouth daily. We Performed the Following T4, FREE H7677831 CPT(R)] T4, FREE V7197196 CPT(R)] THYROID PEROXIDASE (TPO) AB [99807 CPT(R)] THYROID PEROXIDASE (TPO) AB [16184 CPT(R)] TSH 3RD GENERATION [89474 CPT(R)] TSH 3RD GENERATION [90726 CPT(R)] Follow-up Instructions Return in about 6 months (around 12/29/2018). Patient Instructions 1) Please call [phone] to reset your [facility] password. 2) Give a trial of unithroid 1/2 of 25 mcg tablet just 3 days a week at bedtime. 3) Do this for the next 6 weeks and then repeat your labs and we'll see the effect of this dose. Introducing \[facility]\"" & HEALTH SERVICES! Dear Breann Salcido: Thank you for requesting a [employer] account. Our records indicate that you already have an active [employer] account. You can access your account anytime at [email or web address]. [email or web address] Did you know that you can access your hospital and ER discharge instructions at any time in [employer]?   You can also review all of your test results from your hospital stay or ER visit. Additional Information If you have questions, please visit the Frequently Asked Questions section of the [facility] website at [email or web address] Remember, [facility] is NOT to be used for urgent needs. For medical emergencies, dial 911. Now available from your iPhone and Android! Please provide this summary of care documentation to your next provider. Your primary care clinician is listed as Duey Favre. If you have any questions after today's visit, please call [phone].

## 2018-08-21 LAB
T4 FREE SERPL-MCNC: 1.08 NG/DL (ref 0.82–1.77)
THYROPEROXIDASE AB SERPL-ACNC: 295 IU/ML (ref 0–34)
TSH SERPL DL<=0.005 MIU/L-ACNC: 0.98 UIU/ML (ref 0.45–4.5)

## 2018-08-24 ENCOUNTER — TELEPHONE (OUTPATIENT)
Dept: ENDOCRINOLOGY | Age: 58
End: 2018-08-24

## 2018-08-24 NOTE — LETTER
9/8/2018 8:10 AM 
 
Ms. Baldo Ambriz [address] [facility] [address] [facility] [address] Since you haven't read the message I sent you on 8/22/18 through [address] and haven't returned Lien's phone call from 8/24/18, I wanted to send you a letter with the message: 
 
Just wanted to send you a courtesy message to let you know that you are currently not receiving notifications when a [employer] message is sent. Melina Rios you either need to change your notifications settings to check the box that will allow you to receive a notification in the future when a message or lab result is sent to you through [employer] or you need to ensure you have an e-mail address saved that will allow you to receive a notification when a [employer] message has been sent to you.  Please check on both of these to make sure they are correct.  Thanks. Your TSH is still normal at 0.98 but slightly lower than 1.15 at your last check (lower is better as you are less hypothyroid).  Your TPO antibody level is 295 which is high but down from 324 at last check. Shahram Left you been taking the unithroid 25 mcg tabs 1/2 tab 3x/week at bedtime?  If so, have you felt any better on this as we could try to increase the dose to 1/2 tab every night?  Let me know when you have a chance. If you have any questions, please don't hesitate to call me at [phone].  
 
Sincerely, 
 
 
 
King Strauss MD

## 2018-08-24 NOTE — TELEPHONE ENCOUNTER
Please call pt to let her know she has an unread message in [address] Just wanted to send you a courtesy message to let you know that you are currently not receiving notifications when a [facility] message is sent. Nicole Velázquez you either need to change your notifications settings to check the box that will allow you to receive a notification in the future when a message or lab result is sent to you through [facility] or you need to ensure you have an e-mail address saved that will allow you to receive a notification when a [facility] message has been sent to you.  Please check on both of these to make sure they are correct.  Thanks. Your TSH is still normal at 0.98 but slightly lower than 1.15 at your last check (lower is better as you are less hypothyroid).  Your TPO antibody level is 295 which is high but down from 324 at last check. Peewee Hill you been taking the unithroid 25 mcg tabs 1/2 tab 3x/week at bedtime?  If so, have you felt any better on this as we could try to increase the dose to 1/2 tab every night?  Let me know when you have a chance.

## 2018-12-28 LAB
T4 FREE SERPL-MCNC: 1.13 NG/DL (ref 0.82–1.77)
THYROPEROXIDASE AB SERPL-ACNC: 262 IU/ML (ref 0–34)
TSH SERPL DL<=0.005 MIU/L-ACNC: 1.28 UIU/ML (ref 0.45–4.5)

## 2019-01-03 ENCOUNTER — TELEPHONE (OUTPATIENT)
Dept: ENDOCRINOLOGY | Age: 59
End: 2019-01-03

## 2019-01-03 NOTE — TELEPHONE ENCOUNTER
Is she able to come back at 1:30 today as I will be on time at that point? If not, I can send her a copy of her labs in the mail and you can reschedule her for 6 months and I'll mail her a lab slip to repeat her labs prior to the visit.

## 2019-01-03 NOTE — TELEPHONE ENCOUNTER
Patient was unable to come back at 1:30 PM today. She rescheduled for 7/1/19 at 8:50 AM and was told that you would mail her a copy of her lab results. She was also informed that a lab order would be mailed to her - to have labs repeated prior to her July 2019 appointment.

## 2019-01-03 NOTE — TELEPHONE ENCOUNTER
Patient was here for her appointment, but had to leave. Wants her lab results mailed to her please. Address in [place] is correct.

## 2019-11-08 LAB
T4 FREE SERPL-MCNC: 1.15 NG/DL (ref 0.82–1.77)
THYROPEROXIDASE AB SERPL-ACNC: 181 IU/ML (ref 0–34)
TSH SERPL DL<=0.005 MIU/L-ACNC: 1.14 UIU/ML (ref 0.45–4.5)

## 2019-11-12 ENCOUNTER — OFFICE VISIT (OUTPATIENT)
Dept: ENDOCRINOLOGY | Age: 59
End: 2019-11-12

## 2019-11-12 ENCOUNTER — TELEPHONE (OUTPATIENT)
Dept: ENDOCRINOLOGY | Age: 59
End: 2019-11-12

## 2019-11-12 NOTE — TELEPHONE ENCOUNTER
Please let her know I have printed a letter with her lab results that her  can  or I can mail it to her. This is what the letter states: Your thyroid levels (TSH and free T4) remain normal and your thyroid antibody level for hashimoto's remains high at 181 but lower than last year when it was 262. I don't think you need to come back and see me any more in the future and can just have Dr. Turcios Pry check your levels once a year and if your TSH is ever out of the normal range, I'm happy to see you back in the future.

## 2019-11-12 NOTE — TELEPHONE ENCOUNTER
----- Message from Ramirez Gilliland sent at 11/12/2019 10:14 AM EST -----  Dr. Richy Monte,    . Homero Brown left and did not want to reschedule at this time. She stated that she would like a print out of her lab results and she will have her  pick it up.

## 2020-12-22 ENCOUNTER — OFFICE VISIT (OUTPATIENT)
Dept: URGENT CARE | Age: 60
End: 2020-12-22
Payer: MEDICAID

## 2020-12-22 VITALS — TEMPERATURE: 98.2 F | HEART RATE: 72 BPM | OXYGEN SATURATION: 99 % | RESPIRATION RATE: 16 BRPM

## 2020-12-22 DIAGNOSIS — R68.89 FLU-LIKE SYMPTOMS: Primary | ICD-10-CM

## 2020-12-22 PROCEDURE — 99202 OFFICE O/P NEW SF 15 MIN: CPT | Performed by: EMERGENCY MEDICINE

## 2020-12-22 NOTE — PROGRESS NOTES
The history is provided by the patient. Cough  This is a new problem. Episode onset: more than 2 weeks. The problem occurs constantly. The problem has not changed since onset. The cough is non-productive. Maximum temperature: low grade, <100. The fever has been present for 5 days or more. Associated symptoms include chest pain (for 3 days at sx onset; resolved.), chills, sweats, headaches, myalgias, shortness of breath and nausea. Pertinent negatives include no rhinorrhea, no sore throat, no wheezing and no vomiting. She has tried nothing for the symptoms. The treatment provided no relief. Shortness of Breath  Associated symptoms include a fever, headaches, cough and chest pain (for 3 days at sx onset; resolved. ). Pertinent negatives include no rhinorrhea, no sore throat, no wheezing and no vomiting. Pt had similar, but more severe sxs in April, but never went to get checked out.      Past Medical History:   Diagnosis Date    Hashimoto disease Jan 2012    causing hyperthyroidism Dr. Rupert Kaplan    Kidney stones     Salivary gland stone     Small bowel obstruction ([facility] [address] 3/30/2014    Stiff person syndrome 2010    Dr. Milena You neurologist        Past Surgical History:   Procedure Laterality Date    APPENDECTOMY      HX HYSTERECTOMY      HX LEFT SALPINGO-OOPHORECTOMY           Family History   Problem Relation Age of Onset    Diabetes Other         maternal great aunt    Dementia Mother     Heart Disease Father     Thyroid Disease Neg Hx         Social History     Socioeconomic History    Marital status:      Spouse name: Not on file    Number of children: Not on file    Years of education: Not on file    Highest education level: Not on file   Occupational History    Not on file   Social Needs    Financial resource strain: Not on file    Food insecurity     Worry: Not on file     Inability: Not on file    Transportation needs     Medical: Not on file     Non-medical: Not on file Tobacco Use    Smoking status: Never Smoker    Smokeless tobacco: Never Used   Substance and Sexual Activity    Alcohol use: Yes     Comment: on holidays    Drug use: No    Sexual activity: Not on file   Lifestyle    Physical activity     Days per week: Not on file     Minutes per session: Not on file    Stress: Not on file   Relationships    Social connections     Talks on phone: Not on file     Gets together: Not on file     Attends Oriental orthodox service: Not on file     Active member of club or organization: Not on file     Attends meetings of clubs or organizations: Not on file     Relationship status: Not on file    Intimate partner violence     Fear of current or ex partner: Not on file     Emotionally abused: Not on file     Physically abused: Not on file     Forced sexual activity: Not on file   Other Topics Concern    Not on file   Social History Narrative    Lives in [place] with  of 12 years. Has 1 daughter who is 27. Used to work with her  with his [employer] business. Likes to read. ALLERGIES: Other food and Methimazole    Review of Systems   Constitutional: Positive for chills and fever. HENT: Negative for ear discharge, rhinorrhea, sinus pressure and sore throat. Eyes: Negative for photophobia. Respiratory: Positive for cough and shortness of breath. Negative for chest tightness, wheezing and stridor. Cardiovascular: Positive for chest pain (for 3 days at sx onset; resolved. ). Gastrointestinal: Positive for diarrhea and nausea. Negative for vomiting. Musculoskeletal: Positive for myalgias. Neurological: Positive for headaches. Vitals:    12/22/20 0958   Pulse: 72   Resp: 16   Temp: 98.2 °F (36.8 °C)   SpO2: 99%       Physical Exam  Constitutional:       General: She is not in acute distress. Appearance: Normal appearance. She is not ill-appearing, toxic-appearing or diaphoretic. HENT:      Head: Normocephalic and atraumatic. Right Ear: External ear normal.      Left Ear: External ear normal.   Eyes:      Conjunctiva/sclera: Conjunctivae normal.   Neck:      Musculoskeletal: Normal range of motion. Cardiovascular:      Rate and Rhythm: Normal rate. Pulmonary:      Effort: Pulmonary effort is normal.   Neurological:      Mental Status: She is alert. MDM     Differential Diagnosis; Clinical Impression; Plan:     Pt educated at length need to seek emergency medical care for chest pain or other cardiac sxs. CP has resolved since sx onset 2 weeks ago, so concern for ACS low.        Procedures

## 2020-12-24 LAB — SARS-COV-2, NAA: NOT DETECTED

## 2021-05-14 ENCOUNTER — APPOINTMENT (OUTPATIENT)
Dept: GENERAL RADIOLOGY | Age: 61
End: 2021-05-14
Attending: EMERGENCY MEDICINE
Payer: MEDICAID

## 2021-05-14 ENCOUNTER — HOSPITAL ENCOUNTER (EMERGENCY)
Age: 61
Discharge: HOME | End: 2021-05-14
Attending: STUDENT IN AN ORGANIZED HEALTH CARE EDUCATION/TRAINING PROGRAM
Payer: MEDICAID

## 2021-05-14 VITALS
HEART RATE: 75 BPM | DIASTOLIC BLOOD PRESSURE: 69 MMHG | OXYGEN SATURATION: 100 % | WEIGHT: 116.84 LBS | RESPIRATION RATE: 22 BRPM | SYSTOLIC BLOOD PRESSURE: 150 MMHG | BODY MASS INDEX: 23.56 KG/M2 | HEIGHT: 59 IN | TEMPERATURE: 97.5 F

## 2021-05-14 DIAGNOSIS — R07.9 CHEST PAIN, UNSPECIFIED TYPE: Primary | ICD-10-CM

## 2021-05-14 LAB
ALBUMIN SERPL-MCNC: 3.7 G/DL (ref 3.5–5)
ALBUMIN/GLOB SERPL: 1 {RATIO} (ref 1.1–2.2)
ALP SERPL-CCNC: 54 U/L (ref 45–117)
ALT SERPL-CCNC: 24 U/L (ref 12–78)
ANION GAP SERPL CALC-SCNC: 3 MMOL/L (ref 5–15)
AST SERPL-CCNC: 18 U/L (ref 15–37)
ATRIAL RATE: 78 BPM
BASOPHILS # BLD: 0.1 K/UL (ref 0–0.1)
BASOPHILS NFR BLD: 1 % (ref 0–1)
BILIRUB SERPL-MCNC: 0.3 MG/DL (ref 0.2–1)
BUN SERPL-MCNC: 15 MG/DL (ref 6–20)
BUN/CREAT SERPL: 24 (ref 12–20)
CALCIUM SERPL-MCNC: 8.7 MG/DL (ref 8.5–10.1)
CALCULATED P AXIS, ECG09: 68 DEGREES
CALCULATED R AXIS, ECG10: -3 DEGREES
CALCULATED T AXIS, ECG11: 45 DEGREES
CHLORIDE SERPL-SCNC: 105 MMOL/L (ref 97–108)
CO2 SERPL-SCNC: 29 MMOL/L (ref 21–32)
CREAT SERPL-MCNC: 0.62 MG/DL (ref 0.55–1.02)
D DIMER PPP FEU-MCNC: 0.46 MG/L FEU (ref 0–0.65)
DIAGNOSIS, 93000: NORMAL
DIFFERENTIAL METHOD BLD: ABNORMAL
EOSINOPHIL # BLD: 0.1 K/UL (ref 0–0.4)
EOSINOPHIL NFR BLD: 1 % (ref 0–7)
ERYTHROCYTE [DISTWIDTH] IN BLOOD BY AUTOMATED COUNT: 13.5 % (ref 11.5–14.5)
GLOBULIN SER CALC-MCNC: 3.7 G/DL (ref 2–4)
GLUCOSE SERPL-MCNC: 94 MG/DL (ref 65–100)
HCT VFR BLD AUTO: 39 % (ref 35–47)
HGB BLD-MCNC: 12.8 G/DL (ref 11.5–16)
IMM GRANULOCYTES # BLD AUTO: 0 K/UL (ref 0–0.04)
IMM GRANULOCYTES NFR BLD AUTO: 1 % (ref 0–0.5)
LYMPHOCYTES # BLD: 2.1 K/UL (ref 0.8–3.5)
LYMPHOCYTES NFR BLD: 29 % (ref 12–49)
MCH RBC QN AUTO: 32.6 PG (ref 26–34)
MCHC RBC AUTO-ENTMCNC: 32.8 G/DL (ref 30–36.5)
MCV RBC AUTO: 99.2 FL (ref 80–99)
MONOCYTES # BLD: 0.6 K/UL (ref 0–1)
MONOCYTES NFR BLD: 8 % (ref 5–13)
NEUTS SEG # BLD: 4.5 K/UL (ref 1.8–8)
NEUTS SEG NFR BLD: 60 % (ref 32–75)
NRBC # BLD: 0 K/UL (ref 0–0.01)
NRBC BLD-RTO: 0 PER 100 WBC
P-R INTERVAL, ECG05: 152 MS
PLATELET # BLD AUTO: 247 K/UL (ref 150–400)
PMV BLD AUTO: 9.1 FL (ref 8.9–12.9)
POTASSIUM SERPL-SCNC: 4.1 MMOL/L (ref 3.5–5.1)
PROT SERPL-MCNC: 7.4 G/DL (ref 6.4–8.2)
Q-T INTERVAL, ECG07: 402 MS
QRS DURATION, ECG06: 72 MS
QTC CALCULATION (BEZET), ECG08: 458 MS
RBC # BLD AUTO: 3.93 M/UL (ref 3.8–5.2)
SODIUM SERPL-SCNC: 137 MMOL/L (ref 136–145)
TROPONIN I SERPL-MCNC: <0.05 NG/ML
VENTRICULAR RATE, ECG03: 78 BPM
WBC # BLD AUTO: 7.4 K/UL (ref 3.6–11)

## 2021-05-14 PROCEDURE — 99284 EMERGENCY DEPT VISIT MOD MDM: CPT

## 2021-05-14 PROCEDURE — 85379 FIBRIN DEGRADATION QUANT: CPT

## 2021-05-14 PROCEDURE — 36415 COLL VENOUS BLD VENIPUNCTURE: CPT

## 2021-05-14 PROCEDURE — 74011250637 HC RX REV CODE- 250/637: Performed by: STUDENT IN AN ORGANIZED HEALTH CARE EDUCATION/TRAINING PROGRAM

## 2021-05-14 PROCEDURE — 96374 THER/PROPH/DIAG INJ IV PUSH: CPT

## 2021-05-14 PROCEDURE — 85025 COMPLETE CBC W/AUTO DIFF WBC: CPT

## 2021-05-14 PROCEDURE — 71046 X-RAY EXAM CHEST 2 VIEWS: CPT

## 2021-05-14 PROCEDURE — 74011250636 HC RX REV CODE- 250/636: Performed by: STUDENT IN AN ORGANIZED HEALTH CARE EDUCATION/TRAINING PROGRAM

## 2021-05-14 PROCEDURE — 93005 ELECTROCARDIOGRAM TRACING: CPT

## 2021-05-14 PROCEDURE — 74011000250 HC RX REV CODE- 250: Performed by: STUDENT IN AN ORGANIZED HEALTH CARE EDUCATION/TRAINING PROGRAM

## 2021-05-14 PROCEDURE — 84484 ASSAY OF TROPONIN QUANT: CPT

## 2021-05-14 PROCEDURE — 80053 COMPREHEN METABOLIC PANEL: CPT

## 2021-05-14 PROCEDURE — 96375 TX/PRO/DX INJ NEW DRUG ADDON: CPT

## 2021-05-14 RX ORDER — NALOXONE HYDROCHLORIDE 4 MG/.1ML
SPRAY NASAL
Qty: 2 EACH | Refills: 0 | Status: SHIPPED | OUTPATIENT
Start: 2021-05-14

## 2021-05-14 RX ORDER — NAPROXEN 500 MG/1
500 TABLET ORAL
Qty: 20 TAB | Refills: 0 | Status: SHIPPED | OUTPATIENT
Start: 2021-05-14

## 2021-05-14 RX ORDER — KETOROLAC TROMETHAMINE 30 MG/ML
15 INJECTION, SOLUTION INTRAMUSCULAR; INTRAVENOUS
Status: COMPLETED | OUTPATIENT
Start: 2021-05-14 | End: 2021-05-14

## 2021-05-14 RX ORDER — HYDROCODONE BITARTRATE AND ACETAMINOPHEN 5; 325 MG/1; MG/1
1 TABLET ORAL
Qty: 6 TAB | Refills: 0 | Status: SHIPPED | OUTPATIENT
Start: 2021-05-14 | End: 2021-05-17

## 2021-05-14 RX ORDER — LIDOCAINE 4 G/100G
1 PATCH TOPICAL EVERY 24 HOURS
Status: DISCONTINUED | OUTPATIENT
Start: 2021-05-14 | End: 2021-05-14 | Stop reason: HOSPADM

## 2021-05-14 RX ORDER — HYDROCODONE BITARTRATE AND ACETAMINOPHEN 5; 325 MG/1; MG/1
1 TABLET ORAL ONCE
Status: COMPLETED | OUTPATIENT
Start: 2021-05-14 | End: 2021-05-14

## 2021-05-14 RX ORDER — MORPHINE SULFATE 2 MG/ML
2 INJECTION, SOLUTION INTRAMUSCULAR; INTRAVENOUS ONCE
Status: COMPLETED | OUTPATIENT
Start: 2021-05-14 | End: 2021-05-14

## 2021-05-14 RX ADMIN — KETOROLAC TROMETHAMINE 15 MG: 30 INJECTION, SOLUTION INTRAMUSCULAR; INTRAVENOUS at 12:12

## 2021-05-14 RX ADMIN — MORPHINE SULFATE 2 MG: 2 INJECTION, SOLUTION INTRAMUSCULAR; INTRAVENOUS at 12:12

## 2021-05-14 RX ADMIN — HYDROCODONE BITARTRATE AND ACETAMINOPHEN 1 TABLET: 5; 325 TABLET ORAL at 13:20

## 2021-05-14 NOTE — ED PROVIDER NOTES
EMERGENCY DEPARTMENT HISTORY AND PHYSICAL EXAM      Date: 5/14/2021  Patient Name: Shawn Allen    History of Presenting Illness     Chief Complaint   Patient presents with    Chest Pain     started yesterday with an episode of dizziness- hurts more to take a deep breath; no SOB, nausea or diaphorisis         HPI: Shawn Allen, 63 y.o. female presents to the ED with cc of chest pain. She describes this as a sharp left sided anterior chest pain that started yesterday, she felt a little lightheaded yesterday but that has resolved. The chest pain is worse with breathing or certain movements. She denies any shortness of breath, no fevers or coughing, no recent periods of immobilization, no leg pain or leg swelling. She did fall 3 days ago onto her back, but does not think that she injured the side of her chest.  No nausea or diaphoresis. There are no other complaints, changes, or physical findings at this time. PCP: Erik Schwab, MD    No current facility-administered medications on file prior to encounter. Current Outpatient Medications on File Prior to Encounter   Medication Sig Dispense Refill    UNITHROID 25 mcg tablet Take 1/2 tab 3x/week at bedtime on Mon, Wed, and Fri night 14 Tab 0    diazePAM (VALIUM) 5 mg tablet TAKE 1 TABLET BY MOUTH THREE TIMES DAILY AS NEEDED FOR MUSCLE SPASMS 90 Tab 5    magnesium oxide (MAG-OX) 400 mg tablet Take 400 mg by mouth daily.  selenium 200 mcg cap Take  by mouth two (2) times a day.  b complex vitamins tablet Take 1 Tab by mouth as needed.  cholecalciferol (VITAMIN D3) 1,000 unit tablet Take 2,000 Units by mouth daily.  omega-3 fatty acids-vitamin e (FISH OIL) 1,000 mg cap Take 1-2 Caps by mouth every other day.          Past History     Past Medical History:  Past Medical History:   Diagnosis Date    Hashimoto disease Jan 2012    causing hyperthyroidism Dr. Tyesha Carson    Kidney stones     Salivary gland stone     [facility]    Stiff person syndrome 2010    Dr. Rabia Lynne neurologist       Past Surgical History:  Past Surgical History:   Procedure Laterality Date    HX HYSTERECTOMY      HX LEFT SALPINGO-OOPHORECTOMY      MA APPENDECTOMY         Family History:  Family History   Problem Relation Age of Onset    Diabetes Other         maternal great aunt    Dementia Mother     Heart Disease Father     Thyroid Disease Neg Hx        Social History:  Social History     Tobacco Use    Smoking status: Never Smoker    Smokeless tobacco: Never Used   Substance Use Topics    Alcohol use: Yes     Comment: on holidays    Drug use: No       Allergies: Allergies   Allergen Reactions    Other Food Hives     Solu medrol     Methimazole Rash         Review of Systems   no fever  No eye pain  No ear pain  no shortness of breath  Reports chest pain  no abdominal pain  no dysuria  no leg pain  No rash  No lymphadenopathy  No weight loss    Physical Exam   Physical Exam  Constitutional:       Appearance: She is not toxic-appearing. HENT:      Head: Normocephalic. Eyes:      Extraocular Movements: Extraocular movements intact. Neck:      Musculoskeletal: Neck supple. Cardiovascular:      Rate and Rhythm: Normal rate and regular rhythm. Pulmonary:      Effort: Pulmonary effort is normal.      Breath sounds: Normal breath sounds. Chest:      Comments: Tender to palpation over the left lower anterior chest wall without any skin changes  Abdominal:      Palpations: Abdomen is soft. Tenderness: There is no abdominal tenderness. Musculoskeletal:      Right lower leg: She exhibits no tenderness. No edema. Left lower leg: She exhibits no tenderness. No edema. Skin:     General: Skin is warm and dry. Neurological:      General: No focal deficit present. Mental Status: She is alert and oriented to person, place, and time.    Psychiatric:         Mood and Affect: Mood normal. Diagnostic Study Results     Labs -     Recent Results (from the past 24 hour(s))   EKG, 12 LEAD, INITIAL    Collection Time: 05/14/21  9:48 AM   Result Value Ref Range    Ventricular Rate 78 BPM    Atrial Rate 78 BPM    P-R Interval 152 ms    QRS Duration 72 ms    Q-T Interval 402 ms    QTC Calculation (Bezet) 458 ms    Calculated P Axis 68 degrees    Calculated R Axis -3 degrees    Calculated T Axis 45 degrees    Diagnosis       Normal sinus rhythm  Normal ECG  Confirmed by Gauzy Screen ([id number]) on 5/14/2021 12:33:28 PM     CBC WITH AUTOMATED DIFF    Collection Time: 05/14/21 10:26 AM   Result Value Ref Range    WBC 7.4 3.6 - 11.0 K/uL    RBC 3.93 3.80 - 5.20 M/uL    HGB 12.8 11.5 - 16.0 g/dL    HCT 39.0 35.0 - 47.0 %    MCV 99.2 (H) 80.0 - 99.0 FL    MCH 32.6 26.0 - 34.0 PG    MCHC 32.8 30.0 - 36.5 g/dL    RDW 13.5 11.5 - 14.5 %    PLATELET 054 900 - 080 K/uL    MPV 9.1 8.9 - 12.9 FL    NRBC 0.0 0  WBC    ABSOLUTE NRBC 0.00 0.00 - 0.01 K/uL    NEUTROPHILS 60 32 - 75 %    LYMPHOCYTES 29 12 - 49 %    MONOCYTES 8 5 - 13 %    EOSINOPHILS 1 0 - 7 %    BASOPHILS 1 0 - 1 %    IMMATURE GRANULOCYTES 1 (H) 0.0 - 0.5 %    ABS. NEUTROPHILS 4.5 1.8 - 8.0 K/UL    ABS. LYMPHOCYTES 2.1 0.8 - 3.5 K/UL    ABS. MONOCYTES 0.6 0.0 - 1.0 K/UL    ABS. EOSINOPHILS 0.1 0.0 - 0.4 K/UL    ABS. BASOPHILS 0.1 0.0 - 0.1 K/UL    ABS. IMM.  GRANS. 0.0 0.00 - 0.04 K/UL    DF AUTOMATED     METABOLIC PANEL, COMPREHENSIVE    Collection Time: 05/14/21 10:26 AM   Result Value Ref Range    Sodium 137 136 - 145 mmol/L    Potassium 4.1 3.5 - 5.1 mmol/L    Chloride 105 97 - 108 mmol/L    CO2 29 21 - 32 mmol/L    Anion gap 3 (L) 5 - 15 mmol/L    Glucose 94 65 - 100 mg/dL    BUN 15 6 - 20 MG/DL    Creatinine 0.62 0.55 - 1.02 MG/DL    BUN/Creatinine ratio 24 (H) 12 - 20      GFR est AA >60 >60 ml/min/1.73m2    GFR est non-AA >60 >60 ml/min/1.73m2    Calcium 8.7 8.5 - 10.1 MG/DL    Bilirubin, total 0.3 0.2 - 1.0 MG/DL    ALT (SGPT) 24 12 - 78 U/L    AST (SGOT) 18 15 - 37 U/L    Alk. phosphatase 54 45 - 117 U/L    Protein, total 7.4 6.4 - 8.2 g/dL    Albumin 3.7 3.5 - 5.0 g/dL    Globulin 3.7 2.0 - 4.0 g/dL    A-G Ratio 1.0 (L) 1.1 - 2.2     TROPONIN I    Collection Time: 05/14/21 10:26 AM   Result Value Ref Range    Troponin-I, Qt. <0.05 <0.05 ng/mL   D DIMER    Collection Time: 05/14/21 12:04 PM   Result Value Ref Range    D-dimer 0.46 0.00 - 0.65 mg/L FEU       Radiologic Studies -   XR CHEST PA LAT   Final Result   No acute cardiopulmonary disease. CT Results  (Last 48 hours)    None        CXR Results  (Last 48 hours)               05/14/21 1112  XR CHEST PA LAT Final result    Impression:  No acute cardiopulmonary disease. Narrative: Indication: Chest pain. Exam: PA and lateral views of the chest.       Direct comparison is made to prior CXR dated March 2014. Findings: Cardiomediastinal silhouette is within normal limits. Lungs are clear   bilaterally. Pleural spaces are normal. Osseous structures are intact. Medical Decision Making   I am the first provider for this patient. I reviewed the vital signs, available nursing notes, past medical history, past surgical history, family history and social history. Vital Signs-Reviewed the patient's vital signs. Patient Vitals for the past 24 hrs:   Temp Pulse Resp BP SpO2   05/14/21 1245    (!) 150/69 100 %   05/14/21 1144    (!) 158/87    05/14/21 0943 97.5 °F (36.4 °C) 75 22 (!) 174/82 100 %         Provider Notes (Medical Decision Making):   39-year-old presenting with left-sided chest pain. Most likely musculoskeletal in nature, possible costochondritis, muscular pain, pleuritic pain. Atypical for ACS, however given her age cardiac work-up also initiated. Pain medication will be given. ED Course:     Initial assessment performed.  The patients presenting problems have been discussed, and they are in agreement with the care plan formulated and outlined with them. I have encouraged them to ask questions as they arise throughout their visit. EKG is performed at 9: 48, shows normal sinus rhythm at a rate of 78, , QRS 72, QTc 458, axis upright, no ST segment elevation or depression concerning for ACS, this is interpreted as normal sinus rhythm. CBC negative for leukocytosis or anemia, basic metabolic panel with normal renal function, no worrisome electrolyte abnormalities. She is low risk for DVT/PE, and D-dimer is negative. Troponin negative. X-ray is unremarkable. On reevaluation, patient is resting comfortably, states that the pain is improved but still present. She will be discharged with lidocaine patch, will call her primary care doctor on Monday. Will return for any worsening pain, shortness of breath, any new or worrisome symptoms. Critical Care Time:         Disposition:  Home    PLAN:  1. Current Discharge Medication List      START taking these medications    Details   naproxen (NAPROSYN) 500 mg tablet Take 1 Tab by mouth every twelve (12) hours as needed for Pain. Qty: 20 Tab, Refills: 0  Start date: 5/14/2021      HYDROcodone-acetaminophen (Norco) 5-325 mg per tablet Take 1 Tab by mouth every eight (8) hours as needed for Pain for up to 3 days. Max Daily Amount: 3 Tabs. Qty: 6 Tab, Refills: 0  Start date: 5/14/2021, End date: 5/17/2021    Associated Diagnoses: Chest pain, unspecified type      naloxone (NARCAN) 4 mg/actuation nasal spray Use 1 spray intranasally, then discard. Repeat with new spray every 2 min as needed for opioid overdose symptoms, alternating nostrils. Qty: 2 Each, Refills: 0  Start date: 5/14/2021           2.    Follow-up Information     Follow up With Specialties Details Why Contact Info    Rosa Varner MD Family Medicine Call in 3 days  [address] [place] [address] [address] [phone]      [facility] EMERGENCY DEPT Emergency Medicine  As needed, If symptoms worsen [phone] [address] [facility]  [phone]        Return to ED if worse     Diagnosis     Clinical Impression: Acute atypical chest pain

## 2022-03-28 ENCOUNTER — TRANSCRIBE ORDER (OUTPATIENT)
Dept: SCHEDULING | Age: 62
End: 2022-03-28

## 2022-03-28 DIAGNOSIS — M75.42 IMPINGEMENT SYNDROME OF LEFT SHOULDER: Primary | ICD-10-CM

## 2022-03-28 DIAGNOSIS — M75.82 TENDINITIS OF LEFT ROTATOR CUFF: ICD-10-CM

## 2022-04-04 ENCOUNTER — HOSPITAL ENCOUNTER (OUTPATIENT)
Dept: MRI IMAGING | Age: 62
Discharge: HOME | End: 2022-04-04
Attending: ORTHOPAEDIC SURGERY
Payer: MEDICAID

## 2022-04-04 DIAGNOSIS — M75.42 IMPINGEMENT SYNDROME OF LEFT SHOULDER: ICD-10-CM

## 2022-04-04 DIAGNOSIS — M75.82 TENDINITIS OF LEFT ROTATOR CUFF: ICD-10-CM

## 2022-04-04 PROCEDURE — 73221 MRI JOINT UPR EXTREM W/O DYE: CPT

## 2022-04-20 ENCOUNTER — TRANSCRIBE ORDER (OUTPATIENT)
Dept: SCHEDULING | Age: 62
End: 2022-04-20

## 2022-04-20 DIAGNOSIS — G89.29 CHRONIC PAIN OF RIGHT KNEE: ICD-10-CM

## 2022-04-20 DIAGNOSIS — M23.91 INTERNAL DERANGEMENT OF RIGHT KNEE: Primary | ICD-10-CM

## 2022-04-20 DIAGNOSIS — M25.561 CHRONIC PAIN OF RIGHT KNEE: ICD-10-CM

## 2022-04-25 ENCOUNTER — HOSPITAL ENCOUNTER (OUTPATIENT)
Dept: MRI IMAGING | Age: 62
Discharge: HOME | End: 2022-04-25
Attending: PHYSICIAN ASSISTANT
Payer: MEDICAID

## 2022-04-25 DIAGNOSIS — M23.91 INTERNAL DERANGEMENT OF RIGHT KNEE: ICD-10-CM

## 2022-04-25 DIAGNOSIS — G89.29 CHRONIC PAIN OF RIGHT KNEE: ICD-10-CM

## 2022-04-25 DIAGNOSIS — M25.561 CHRONIC PAIN OF RIGHT KNEE: ICD-10-CM

## 2022-04-25 PROCEDURE — 73721 MRI JNT OF LWR EXTRE W/O DYE: CPT

## 2023-06-08 ENCOUNTER — OFFICE VISIT (OUTPATIENT)
Age: 63
End: 2023-06-08
Payer: COMMERCIAL

## 2023-06-08 VITALS
RESPIRATION RATE: 18 BRPM | HEIGHT: 59 IN | BODY MASS INDEX: 21.97 KG/M2 | SYSTOLIC BLOOD PRESSURE: 154 MMHG | TEMPERATURE: 98.1 F | HEART RATE: 76 BPM | OXYGEN SATURATION: 98 % | WEIGHT: 109 LBS | DIASTOLIC BLOOD PRESSURE: 80 MMHG

## 2023-06-08 DIAGNOSIS — R41.3 DISTURBANCE OF MEMORY: ICD-10-CM

## 2023-06-08 DIAGNOSIS — G70.00 OCULAR MYASTHENIA (HCC): ICD-10-CM

## 2023-06-08 DIAGNOSIS — G25.82 STIFF-MAN SYNDROME: ICD-10-CM

## 2023-06-08 DIAGNOSIS — E03.8 TSH (THYROID-STIMULATING HORMONE DEFICIENCY): ICD-10-CM

## 2023-06-08 DIAGNOSIS — E53.8 B12 DEFICIENCY: ICD-10-CM

## 2023-06-08 DIAGNOSIS — H53.2 DIPLOPIA: ICD-10-CM

## 2023-06-08 DIAGNOSIS — G60.9 HEREDITARY AND IDIOPATHIC PERIPHERAL NEUROPATHY: Primary | ICD-10-CM

## 2023-06-08 DIAGNOSIS — G60.9 HEREDITARY AND IDIOPATHIC PERIPHERAL NEUROPATHY: ICD-10-CM

## 2023-06-08 DIAGNOSIS — E11.42 DIABETIC PERIPHERAL NEUROPATHY ASSOCIATED WITH TYPE 2 DIABETES MELLITUS (HCC): ICD-10-CM

## 2023-06-08 DIAGNOSIS — E55.9 VITAMIN D DEFICIENCY: ICD-10-CM

## 2023-06-08 LAB
25(OH)D3 SERPL-MCNC: 59.1 NG/ML (ref 30–100)
CK SERPL-CCNC: 86 U/L (ref 26–192)
COMMENT:: NORMAL
ERYTHROCYTE [SEDIMENTATION RATE] IN BLOOD: 25 MM/HR (ref 0–30)
EST. AVERAGE GLUCOSE BLD GHB EST-MCNC: 105 MG/DL
HBA1C MFR BLD: 5.3 % (ref 4–5.6)
SPECIMEN HOLD: NORMAL

## 2023-06-08 PROCEDURE — 99245 OFF/OP CONSLTJ NEW/EST HI 55: CPT | Performed by: PSYCHIATRY & NEUROLOGY

## 2023-06-08 RX ORDER — M-VIT,TX,IRON,MINS/CALC/FOLIC 27MG-0.4MG
1 TABLET ORAL DAILY
COMMUNITY

## 2023-06-08 RX ORDER — CHLORAL HYDRATE 500 MG
CAPSULE ORAL DAILY PRN
COMMUNITY

## 2023-06-08 ASSESSMENT — PATIENT HEALTH QUESTIONNAIRE - PHQ9
SUM OF ALL RESPONSES TO PHQ9 QUESTIONS 1 & 2: 0
2. FEELING DOWN, DEPRESSED OR HOPELESS: 0
SUM OF ALL RESPONSES TO PHQ QUESTIONS 1-9: 0
1. LITTLE INTEREST OR PLEASURE IN DOING THINGS: 0
SUM OF ALL RESPONSES TO PHQ QUESTIONS 1-9: 0

## 2023-06-08 NOTE — PROGRESS NOTES
Consult  REFERRED BY:  Erma Steve MD    CHIEF COMPLAINT: Double vision that began about a year ago when she was hospitalized in August 2022 at [facility] [place] [facility]. Subjective:     Marylen Challenger is a 60 y.o. right-handed  female, we are seeing as a new patient, because we have not seen her in 5 years, on urgent work in basis at the request of Dr. Clyde Crespo for double vision, which is a new problem that began about 1 year ago when she was in [place], and ended up with her having a hospitalization at the [address] [place] [address],[address] where she had an MRI of the brain done that was normal except for 1 tiny petechial old area of ischemia in the left frontal lobe, and had a normal MRA of the brain and several normal CTs CTAs, of the head neck, and a normal carotid Doppler study, and had normal myasthenic titers of acetylcholine receptor antibodies, and after 5 days in the hospital she was discharged referred to neurologist we did extensive evaluations of her in addition, and sent off mitochondrial myopathy panel to [facility] lab, and did see him carnitine levels that were normal and her thyroid hormone levels that were normal VALENTE was normal, NMDA receptor antibody titer on serum was negative, muscle enzymes were normal and she had a negative musk antibody titer myasthenia, a Titan autoantibody titer that was normal, and are light are auto antibody is negative, and LR P4 are antibody negative, neuromyotonia panel negative, and JERRI 65 antibody for stiff man syndrome was negative also. She describes her double vision is being [id number], but then she also describes it as being like 2 lines crossing over which she took down the road, the lines would cross over which is very bizarre and hard to explain with double vision. She never got an orbital MRI scan nor an EMG. She left the hospital before all that. All the rest of her blood work including thyroid and other chemistries were negative.   She had

## 2023-06-09 ENCOUNTER — TELEPHONE (OUTPATIENT)
Age: 63
End: 2023-06-09

## 2023-06-09 LAB
FOLATE SERPL-MCNC: 19.2 NG/ML (ref 5–21)
TSH SERPL DL<=0.05 MIU/L-ACNC: 1.19 UIU/ML (ref 0.36–3.74)
VGCC AB SER-SCNC: <1 PMOL/L (ref 0–30)
VIT B12 SERPL-MCNC: 376 PG/ML (ref 193–986)

## 2023-06-10 LAB
CENTROMERE B AB SER-ACNC: <0.2 AI (ref 0–0.9)
CHROMATIN AB SERPL-ACNC: <0.2 AI (ref 0–0.9)
DSDNA AB SER-ACNC: 1 IU/ML (ref 0–9)
ENA JO1 AB SER-ACNC: <0.2 AI (ref 0–0.9)
ENA RNP AB SER-ACNC: <0.2 AI (ref 0–0.9)
ENA SCL70 AB SER-ACNC: <0.2 AI (ref 0–0.9)
ENA SM AB SER-ACNC: 0.3 AI (ref 0–0.9)
ENA SM+RNP AB SER-ACNC: <0.2 AI (ref 0–0.9)
ENA SS-A AB SER-ACNC: 0.2 AI (ref 0–0.9)
ENA SS-B AB SER-ACNC: 0.2 AI (ref 0–0.9)
RIBOSOMAL P AB SER-ACNC: 4.3 AI (ref 0–0.9)
SEE BELOW:, 164879: ABNORMAL
THYROPEROXIDASE AB SERPL-ACNC: 57 IU/ML (ref 0–34)

## 2023-06-12 LAB — ACHR AB SER-SCNC: <0.03 NMOL/L (ref 0–0.24)

## 2023-06-13 LAB
ALBUMIN SERPL ELPH-MCNC: 4.1 G/DL (ref 2.9–4.4)
ALBUMIN/GLOB SERPL: 1.3 (ref 0.7–1.7)
ALPHA1 GLOB SERPL ELPH-MCNC: 0.2 G/DL (ref 0–0.4)
ALPHA2 GLOB SERPL ELPH-MCNC: 0.7 G/DL (ref 0.4–1)
B-GLOBULIN SERPL ELPH-MCNC: 1.2 G/DL (ref 0.7–1.3)
GAMMA GLOB SERPL ELPH-MCNC: 1.2 G/DL (ref 0.4–1.8)
GLOBULIN SER-MCNC: 3.3 G/DL (ref 2.2–3.9)
IGA SERPL-MCNC: 309 MG/DL (ref 87–352)
IGG SERPL-MCNC: 1375 MG/DL (ref 586–1602)
IGM SERPL-MCNC: 106 MG/DL (ref 26–217)
INTERPRETATION SERPL IEP-IMP: NORMAL
M PROTEIN SERPL ELPH-MCNC: NORMAL G/DL
PROT SERPL-MCNC: 7.4 G/DL (ref 6–8.5)

## 2023-06-14 LAB — ACHR BLOCK AB SER-ACNC: 13 % (ref 0–25)

## 2023-06-18 ENCOUNTER — HOSPITAL ENCOUNTER (OUTPATIENT)
Facility: HOSPITAL | Age: 63
Discharge: HOME | End: 2023-06-21
Attending: PSYCHIATRY & NEUROLOGY
Payer: COMMERCIAL

## 2023-06-18 DIAGNOSIS — H53.2 DIPLOPIA: ICD-10-CM

## 2023-06-18 DIAGNOSIS — G70.00 OCULAR MYASTHENIA (HCC): ICD-10-CM

## 2023-06-18 PROCEDURE — 70551 MRI BRAIN STEM W/O DYE: CPT

## 2023-06-19 ENCOUNTER — CLINICAL DOCUMENTATION (OUTPATIENT)
Age: 63
End: 2023-06-19

## 2023-06-19 NOTE — PROGRESS NOTES
I called the patient, let her know that her MRI of the brain and orbits was completely normal, and she will see the neuro-ophthalmologist to get the single-fiber EMG to make sure she does not have myasthenia, which I really doubt, and since her exam is relatively normal I do not think we will be going to find much here

## 2023-06-22 LAB
ACHR AB SER-SCNC: 0.07 NMOL/L (ref 0–0.24)
ACHR BLOCK AB SER-ACNC: 17 % (ref 0–25)
ACHR MOD AB/ACHR TOTAL SFR SER: NORMAL %
ACHR MODULATING AB: 0 % (ref 0–45)

## 2023-07-06 ENCOUNTER — PROCEDURE VISIT (OUTPATIENT)
Age: 63
End: 2023-07-06
Payer: COMMERCIAL

## 2023-07-06 DIAGNOSIS — H02.401 PTOSIS OF RIGHT EYELID: Primary | ICD-10-CM

## 2023-07-06 DIAGNOSIS — R53.1 WEAKNESS GENERALIZED: ICD-10-CM

## 2023-07-06 DIAGNOSIS — M62.89 MUSCLE FATIGUE: ICD-10-CM

## 2023-07-06 PROCEDURE — 95886 MUSC TEST DONE W/N TEST COMP: CPT | Performed by: PSYCHIATRY & NEUROLOGY

## 2023-07-06 PROCEDURE — 95937 NEUROMUSCULAR JUNCTION TEST: CPT | Performed by: PSYCHIATRY & NEUROLOGY

## 2023-07-06 PROCEDURE — 95872 NDL EMG SINGLE FIBER ELTRD: CPT | Performed by: PSYCHIATRY & NEUROLOGY

## 2023-07-06 PROCEDURE — 95910 NRV CNDJ TEST 7-8 STUDIES: CPT | Performed by: PSYCHIATRY & NEUROLOGY

## 2023-08-11 ENCOUNTER — TELEPHONE (OUTPATIENT)
Age: 63
End: 2023-08-11

## 2023-08-11 NOTE — TELEPHONE ENCOUNTER
I called the patient, let her know that the EMG was normal, that Dr. Michelle Marin did and he did neuromuscular test for myasthenia and that was negative, and the patient had normal blood work except for a thyroid peroxidase enzyme level of 50 which really is not that elevated so we will just follow that.

## 2023-10-23 ENCOUNTER — APPOINTMENT (OUTPATIENT)
Facility: HOSPITAL | Age: 63
DRG: 336 | End: 2023-10-23
Payer: COMMERCIAL

## 2023-10-23 ENCOUNTER — HOSPITAL ENCOUNTER (INPATIENT)
Facility: HOSPITAL | Age: 63
LOS: 10 days | Discharge: HOME | DRG: 336 | End: 2023-11-02
Attending: STUDENT IN AN ORGANIZED HEALTH CARE EDUCATION/TRAINING PROGRAM | Admitting: SURGERY
Payer: COMMERCIAL

## 2023-10-23 DIAGNOSIS — K56.609 SBO (SMALL BOWEL OBSTRUCTION) (HCC): Primary | ICD-10-CM

## 2023-10-23 LAB
ALBUMIN SERPL-MCNC: 3.8 G/DL (ref 3.5–5)
ALBUMIN/GLOB SERPL: 0.8 (ref 1.1–2.2)
ALP SERPL-CCNC: 44 U/L (ref 45–117)
ALT SERPL-CCNC: 20 U/L (ref 12–78)
ANION GAP SERPL CALC-SCNC: 8 MMOL/L (ref 5–15)
APPEARANCE UR: CLEAR
AST SERPL-CCNC: 11 U/L (ref 15–37)
BACTERIA URNS QL MICRO: NEGATIVE /HPF
BASOPHILS # BLD: 0 K/UL (ref 0–0.1)
BASOPHILS NFR BLD: 0 % (ref 0–1)
BILIRUB SERPL-MCNC: 0.4 MG/DL (ref 0.2–1)
BILIRUB UR QL: NEGATIVE
BUN SERPL-MCNC: 10 MG/DL (ref 6–20)
BUN/CREAT SERPL: 15 (ref 12–20)
CALCIUM SERPL-MCNC: 8.7 MG/DL (ref 8.5–10.1)
CHLORIDE SERPL-SCNC: 101 MMOL/L (ref 97–108)
CO2 SERPL-SCNC: 24 MMOL/L (ref 21–32)
COLOR UR: ABNORMAL
COMMENT:: NORMAL
CREAT SERPL-MCNC: 0.65 MG/DL (ref 0.55–1.02)
DIFFERENTIAL METHOD BLD: ABNORMAL
EOSINOPHIL # BLD: 0 K/UL (ref 0–0.4)
EOSINOPHIL NFR BLD: 0 % (ref 0–7)
EPITH CASTS URNS QL MICRO: ABNORMAL /LPF
ERYTHROCYTE [DISTWIDTH] IN BLOOD BY AUTOMATED COUNT: 11.4 % (ref 11.5–14.5)
GLOBULIN SER CALC-MCNC: 4.6 G/DL (ref 2–4)
GLUCOSE SERPL-MCNC: 108 MG/DL (ref 65–100)
GLUCOSE UR STRIP.AUTO-MCNC: NEGATIVE MG/DL
HCT VFR BLD AUTO: 37.3 % (ref 35–47)
HGB BLD-MCNC: 12.6 G/DL (ref 11.5–16)
HGB UR QL STRIP: NEGATIVE
HYALINE CASTS URNS QL MICRO: ABNORMAL /LPF (ref 0–2)
IMM GRANULOCYTES # BLD AUTO: 0 K/UL (ref 0–0.04)
IMM GRANULOCYTES NFR BLD AUTO: 0 % (ref 0–0.5)
KETONES UR QL STRIP.AUTO: NEGATIVE MG/DL
LEUKOCYTE ESTERASE UR QL STRIP.AUTO: ABNORMAL
LIPASE SERPL-CCNC: 26 U/L (ref 13–75)
LYMPHOCYTES # BLD: 1.5 K/UL (ref 0.8–3.5)
LYMPHOCYTES NFR BLD: 23 % (ref 12–49)
MCH RBC QN AUTO: 31.7 PG (ref 26–34)
MCHC RBC AUTO-ENTMCNC: 33.8 G/DL (ref 30–36.5)
MCV RBC AUTO: 93.7 FL (ref 80–99)
MONOCYTES # BLD: 0.3 K/UL (ref 0–1)
MONOCYTES NFR BLD: 4 % (ref 5–13)
NEUTS SEG # BLD: 4.9 K/UL (ref 1.8–8)
NEUTS SEG NFR BLD: 73 % (ref 32–75)
NITRITE UR QL STRIP.AUTO: NEGATIVE
NRBC # BLD: 0 K/UL (ref 0–0.01)
NRBC BLD-RTO: 0 PER 100 WBC
PH UR STRIP: 6.5 (ref 5–8)
PLATELET # BLD AUTO: 381 K/UL (ref 150–400)
PMV BLD AUTO: 8.9 FL (ref 8.9–12.9)
POTASSIUM SERPL-SCNC: 3.4 MMOL/L (ref 3.5–5.1)
PROT SERPL-MCNC: 8.4 G/DL (ref 6.4–8.2)
PROT UR STRIP-MCNC: NEGATIVE MG/DL
RBC # BLD AUTO: 3.98 M/UL (ref 3.8–5.2)
RBC #/AREA URNS HPF: ABNORMAL /HPF (ref 0–5)
SODIUM SERPL-SCNC: 133 MMOL/L (ref 136–145)
SP GR UR REFRACTOMETRY: 1.01
SPECIMEN HOLD: NORMAL
URINE CULTURE IF INDICATED: ABNORMAL
UROBILINOGEN UR QL STRIP.AUTO: 0.2 EU/DL (ref 0.2–1)
WBC # BLD AUTO: 6.7 K/UL (ref 3.6–11)
WBC URNS QL MICRO: ABNORMAL /HPF (ref 0–4)

## 2023-10-23 PROCEDURE — 81001 URINALYSIS AUTO W/SCOPE: CPT

## 2023-10-23 PROCEDURE — 2500000003 HC RX 250 WO HCPCS: Performed by: SURGERY

## 2023-10-23 PROCEDURE — 99221 1ST HOSP IP/OBS SF/LOW 40: CPT | Performed by: SURGERY

## 2023-10-23 PROCEDURE — 85025 COMPLETE CBC W/AUTO DIFF WBC: CPT

## 2023-10-23 PROCEDURE — 6360000002 HC RX W HCPCS: Performed by: SURGERY

## 2023-10-23 PROCEDURE — 83690 ASSAY OF LIPASE: CPT

## 2023-10-23 PROCEDURE — 99285 EMERGENCY DEPT VISIT HI MDM: CPT

## 2023-10-23 PROCEDURE — 96374 THER/PROPH/DIAG INJ IV PUSH: CPT

## 2023-10-23 PROCEDURE — 6360000002 HC RX W HCPCS: Performed by: STUDENT IN AN ORGANIZED HEALTH CARE EDUCATION/TRAINING PROGRAM

## 2023-10-23 PROCEDURE — 2580000003 HC RX 258: Performed by: SURGERY

## 2023-10-23 PROCEDURE — 74177 CT ABD & PELVIS W/CONTRAST: CPT

## 2023-10-23 PROCEDURE — 74018 RADEX ABDOMEN 1 VIEW: CPT

## 2023-10-23 PROCEDURE — 6360000004 HC RX CONTRAST MEDICATION: Performed by: STUDENT IN AN ORGANIZED HEALTH CARE EDUCATION/TRAINING PROGRAM

## 2023-10-23 PROCEDURE — A4216 STERILE WATER/SALINE, 10 ML: HCPCS | Performed by: STUDENT IN AN ORGANIZED HEALTH CARE EDUCATION/TRAINING PROGRAM

## 2023-10-23 PROCEDURE — 80053 COMPREHEN METABOLIC PANEL: CPT

## 2023-10-23 PROCEDURE — 36415 COLL VENOUS BLD VENIPUNCTURE: CPT

## 2023-10-23 PROCEDURE — 6360000002 HC RX W HCPCS

## 2023-10-23 PROCEDURE — 1100000000 HC RM PRIVATE

## 2023-10-23 PROCEDURE — 2500000003 HC RX 250 WO HCPCS: Performed by: STUDENT IN AN ORGANIZED HEALTH CARE EDUCATION/TRAINING PROGRAM

## 2023-10-23 PROCEDURE — 96375 TX/PRO/DX INJ NEW DRUG ADDON: CPT

## 2023-10-23 PROCEDURE — 2580000003 HC RX 258: Performed by: STUDENT IN AN ORGANIZED HEALTH CARE EDUCATION/TRAINING PROGRAM

## 2023-10-23 RX ORDER — HYDROMORPHONE HYDROCHLORIDE 1 MG/ML
0.5 INJECTION, SOLUTION INTRAMUSCULAR; INTRAVENOUS; SUBCUTANEOUS
Status: DISCONTINUED | OUTPATIENT
Start: 2023-10-23 | End: 2023-10-27

## 2023-10-23 RX ORDER — LORAZEPAM 2 MG/ML
1 INJECTION INTRAMUSCULAR EVERY 6 HOURS PRN
Status: DISCONTINUED | OUTPATIENT
Start: 2023-10-23 | End: 2023-11-02 | Stop reason: HOSPADM

## 2023-10-23 RX ORDER — MORPHINE SULFATE 4 MG/ML
4 INJECTION, SOLUTION INTRAMUSCULAR; INTRAVENOUS ONCE
Status: COMPLETED | OUTPATIENT
Start: 2023-10-23 | End: 2023-10-23

## 2023-10-23 RX ORDER — ONDANSETRON 2 MG/ML
4 INJECTION INTRAMUSCULAR; INTRAVENOUS EVERY 6 HOURS PRN
Status: DISCONTINUED | OUTPATIENT
Start: 2023-10-23 | End: 2023-11-02 | Stop reason: HOSPADM

## 2023-10-23 RX ORDER — ONDANSETRON 2 MG/ML
4 INJECTION INTRAMUSCULAR; INTRAVENOUS ONCE
Status: COMPLETED | OUTPATIENT
Start: 2023-10-23 | End: 2023-10-23

## 2023-10-23 RX ORDER — SODIUM CHLORIDE 0.9 % (FLUSH) 0.9 %
5-40 SYRINGE (ML) INJECTION PRN
Status: DISCONTINUED | OUTPATIENT
Start: 2023-10-23 | End: 2023-11-02 | Stop reason: HOSPADM

## 2023-10-23 RX ORDER — FENTANYL CITRATE 50 UG/ML
INJECTION, SOLUTION INTRAMUSCULAR; INTRAVENOUS
Status: COMPLETED
Start: 2023-10-23 | End: 2023-10-23

## 2023-10-23 RX ORDER — 0.9 % SODIUM CHLORIDE 0.9 %
1000 INTRAVENOUS SOLUTION INTRAVENOUS ONCE
Status: COMPLETED | OUTPATIENT
Start: 2023-10-23 | End: 2023-10-23

## 2023-10-23 RX ORDER — FENTANYL CITRATE 50 UG/ML
50 INJECTION, SOLUTION INTRAMUSCULAR; INTRAVENOUS
Status: COMPLETED | OUTPATIENT
Start: 2023-10-23 | End: 2023-10-23

## 2023-10-23 RX ORDER — ONDANSETRON 4 MG/1
4 TABLET, ORALLY DISINTEGRATING ORAL EVERY 8 HOURS PRN
Status: DISCONTINUED | OUTPATIENT
Start: 2023-10-23 | End: 2023-11-02 | Stop reason: HOSPADM

## 2023-10-23 RX ORDER — KETOROLAC TROMETHAMINE 30 MG/ML
15 INJECTION, SOLUTION INTRAMUSCULAR; INTRAVENOUS ONCE
Status: COMPLETED | OUTPATIENT
Start: 2023-10-23 | End: 2023-10-23

## 2023-10-23 RX ORDER — OXYMETAZOLINE HYDROCHLORIDE 0.05 G/100ML
2 SPRAY NASAL ONCE
Status: ACTIVE | OUTPATIENT
Start: 2023-10-23 | End: 2023-10-26

## 2023-10-23 RX ORDER — HYDROMORPHONE HYDROCHLORIDE 1 MG/ML
1 INJECTION, SOLUTION INTRAMUSCULAR; INTRAVENOUS; SUBCUTANEOUS
Status: COMPLETED | OUTPATIENT
Start: 2023-10-23 | End: 2023-10-23

## 2023-10-23 RX ORDER — SODIUM CHLORIDE 0.9 % (FLUSH) 0.9 %
5-40 SYRINGE (ML) INJECTION EVERY 12 HOURS SCHEDULED
Status: DISCONTINUED | OUTPATIENT
Start: 2023-10-23 | End: 2023-11-02 | Stop reason: HOSPADM

## 2023-10-23 RX ORDER — ENOXAPARIN SODIUM 100 MG/ML
40 INJECTION SUBCUTANEOUS DAILY
Status: DISCONTINUED | OUTPATIENT
Start: 2023-10-24 | End: 2023-10-28

## 2023-10-23 RX ORDER — LIDOCAINE HYDROCHLORIDE 20 MG/ML
JELLY TOPICAL ONCE
Status: DISCONTINUED | OUTPATIENT
Start: 2023-10-23 | End: 2023-10-23

## 2023-10-23 RX ORDER — DEXTROSE MONOHYDRATE, SODIUM CHLORIDE, AND POTASSIUM CHLORIDE 50; 1.49; 4.5 G/1000ML; G/1000ML; G/1000ML
INJECTION, SOLUTION INTRAVENOUS CONTINUOUS
Status: DISCONTINUED | OUTPATIENT
Start: 2023-10-23 | End: 2023-10-26

## 2023-10-23 RX ORDER — FENTANYL CITRATE 50 UG/ML
25 INJECTION, SOLUTION INTRAMUSCULAR; INTRAVENOUS
Status: COMPLETED | OUTPATIENT
Start: 2023-10-23 | End: 2023-10-23

## 2023-10-23 RX ORDER — LIDOCAINE HYDROCHLORIDE 20 MG/ML
JELLY TOPICAL ONCE
Status: DISCONTINUED | OUTPATIENT
Start: 2023-10-23 | End: 2023-10-28

## 2023-10-23 RX ORDER — SODIUM CHLORIDE 9 MG/ML
INJECTION, SOLUTION INTRAVENOUS PRN
Status: DISCONTINUED | OUTPATIENT
Start: 2023-10-23 | End: 2023-11-02 | Stop reason: HOSPADM

## 2023-10-23 RX ORDER — LORAZEPAM 2 MG/ML
INJECTION INTRAMUSCULAR
Status: COMPLETED
Start: 2023-10-23 | End: 2023-10-23

## 2023-10-23 RX ORDER — ACETAMINOPHEN 160 MG/5ML
650 LIQUID ORAL EVERY 4 HOURS PRN
Status: DISCONTINUED | OUTPATIENT
Start: 2023-10-23 | End: 2023-11-02 | Stop reason: HOSPADM

## 2023-10-23 RX ADMIN — SODIUM CHLORIDE, PRESERVATIVE FREE 10 ML: 5 INJECTION INTRAVENOUS at 20:08

## 2023-10-23 RX ADMIN — SODIUM CHLORIDE 1000 ML: 9 INJECTION, SOLUTION INTRAVENOUS at 09:55

## 2023-10-23 RX ADMIN — KETOROLAC TROMETHAMINE 15 MG: 30 INJECTION, SOLUTION INTRAMUSCULAR; INTRAVENOUS at 11:20

## 2023-10-23 RX ADMIN — LORAZEPAM 1 MG: 2 INJECTION INTRAMUSCULAR; INTRAVENOUS at 23:03

## 2023-10-23 RX ADMIN — IOPAMIDOL 100 ML: 755 INJECTION, SOLUTION INTRAVENOUS at 11:06

## 2023-10-23 RX ADMIN — FAMOTIDINE 20 MG: 10 INJECTION, SOLUTION INTRAVENOUS at 11:21

## 2023-10-23 RX ADMIN — FENTANYL CITRATE 50 MCG: 50 INJECTION, SOLUTION INTRAMUSCULAR; INTRAVENOUS at 13:36

## 2023-10-23 RX ADMIN — FENTANYL CITRATE 25 MCG: 50 INJECTION, SOLUTION INTRAMUSCULAR; INTRAVENOUS at 14:28

## 2023-10-23 RX ADMIN — LORAZEPAM 0.5 MG: 2 INJECTION INTRAMUSCULAR; INTRAVENOUS at 13:17

## 2023-10-23 RX ADMIN — HYDROMORPHONE HYDROCHLORIDE 0.5 MG: 1 INJECTION, SOLUTION INTRAMUSCULAR; INTRAVENOUS; SUBCUTANEOUS at 20:07

## 2023-10-23 RX ADMIN — DEXTROSE MONOHYDRATE, SODIUM CHLORIDE, AND POTASSIUM CHLORIDE: 50; 4.5; 1.49 INJECTION, SOLUTION INTRAVENOUS at 15:49

## 2023-10-23 RX ADMIN — MORPHINE SULFATE 4 MG: 4 INJECTION, SOLUTION INTRAMUSCULAR; INTRAVENOUS at 09:56

## 2023-10-23 RX ADMIN — HYDROMORPHONE HYDROCHLORIDE 1 MG: 1 INJECTION, SOLUTION INTRAMUSCULAR; INTRAVENOUS; SUBCUTANEOUS at 12:05

## 2023-10-23 RX ADMIN — DEXTROSE MONOHYDRATE, SODIUM CHLORIDE, AND POTASSIUM CHLORIDE: 50; 4.5; 1.49 INJECTION, SOLUTION INTRAVENOUS at 23:03

## 2023-10-23 RX ADMIN — ONDANSETRON 4 MG: 2 INJECTION INTRAMUSCULAR; INTRAVENOUS at 09:56

## 2023-10-23 RX ADMIN — HYDROMORPHONE HYDROCHLORIDE 0.5 MG: 1 INJECTION, SOLUTION INTRAMUSCULAR; INTRAVENOUS; SUBCUTANEOUS at 16:40

## 2023-10-23 ASSESSMENT — PAIN DESCRIPTION - ORIENTATION
ORIENTATION: MID
ORIENTATION: RIGHT;MID;LOWER

## 2023-10-23 ASSESSMENT — PAIN SCALES - GENERAL
PAINLEVEL_OUTOF10: 7
PAINLEVEL_OUTOF10: 8
PAINLEVEL_OUTOF10: 10
PAINLEVEL_OUTOF10: 10
PAINLEVEL_OUTOF10: 9
PAINLEVEL_OUTOF10: 6

## 2023-10-23 ASSESSMENT — PAIN - FUNCTIONAL ASSESSMENT
PAIN_FUNCTIONAL_ASSESSMENT: INTOLERABLE, UNABLE TO DO ANY ACTIVE OR PASSIVE ACTIVITIES
PAIN_FUNCTIONAL_ASSESSMENT: ACTIVITIES ARE NOT PREVENTED
PAIN_FUNCTIONAL_ASSESSMENT: PREVENTS OR INTERFERES WITH ALL ACTIVE AND SOME PASSIVE ACTIVITIES
PAIN_FUNCTIONAL_ASSESSMENT: ACTIVITIES ARE NOT PREVENTED

## 2023-10-23 ASSESSMENT — LIFESTYLE VARIABLES
HOW MANY STANDARD DRINKS CONTAINING ALCOHOL DO YOU HAVE ON A TYPICAL DAY: PATIENT DOES NOT DRINK
HOW OFTEN DO YOU HAVE A DRINK CONTAINING ALCOHOL: NEVER

## 2023-10-23 ASSESSMENT — PAIN DESCRIPTION - LOCATION
LOCATION: ABDOMEN

## 2023-10-23 ASSESSMENT — PAIN DESCRIPTION - PAIN TYPE: TYPE: ACUTE PAIN

## 2023-10-23 ASSESSMENT — PAIN DESCRIPTION - DESCRIPTORS
DESCRIPTORS: ACHING;STABBING
DESCRIPTORS: DISCOMFORT;STABBING;SHARP
DESCRIPTORS: ACHING

## 2023-10-23 ASSESSMENT — PAIN DESCRIPTION - ONSET: ONSET: GRADUAL

## 2023-10-23 ASSESSMENT — PAIN DESCRIPTION - FREQUENCY: FREQUENCY: CONTINUOUS

## 2023-10-23 NOTE — PROGRESS NOTES
Upon assessing patient for admission a dual skin assessment was performed with Krystal Heck. Patient informed we were going to look over her skin prior to looking her over. Patients heels, groin folds, under breasts, elbows and back and buttocks were assessed for any sign of skin breakdown. Approx one hour later patients  approaches nurses desk with concerns as to why we checked her skin and \"examined her vagina and rectum\" and that the patient Patricio Alexis been in the hospital many times and this has never happened before\". I explained to him that we did a skin check per hospital policy for every admission that comes into the hospital and that has been a policy for as long as this RN has been a nurse. Approx 15min later patient was ringing and was asking for the name of the second nurse that had assessed her groin fold and stated that this \"has never happened to her before and wasn't necessary as to why she was here\". Again I explained why we do skin assessments on every patient that gets admitted to the hospital and why we have two nurses in the room to do so.  Nimo Covington, RN

## 2023-10-23 NOTE — ED PROVIDER NOTES
air-fluid levels consistent with small bowel obstruction. Patient is requiring multiple doses of IV narcotics to control her pain, she continues to be uncomfortable and crying on reevaluation. NG tube will be placed. On reevaluation, vitals are stable. Critical Care Time:     I have spent 35 minutes of critical care time in evaluating and treating this patient. This includes time spent at bedside, time with family and decision makers, documentation, review of labs and imaging, and/or consultation with specialists. It does not include time spent on separately billed procedures. This patient presents with a critical illness or injury that acutely impairs one or more vital organ systems such that there is a high probability of imminent or life threatening deterioration in the patient's condition. This case involved decision making of high complexity to assess, manipulate, and support vital organ system failure and/or to prevent further life threatening deterioration of the patient's condition. Failure to initiate these interventions on an urgent basis would likely result in sudden, clinically significant or life threatening deterioration in the patient's condition. Abnormal findings supporting critical care: Small bowel obstruction, intractable abdominal pain. Interventions to support critical care: Nasogastric tube, IV fluids, opioids  Failure to intervene may result in: Sepsis, shock    Disposition:  Admit  {Shameka Beltran's  results have been reviewed with her. She has been counseled regarding her diagnosis, treatment, and plan. She verbally conveys understanding and agreement of the signs, symptoms, diagnosis, treatment and prognosis and additionally agrees to follow up as discussed. She also agrees with the care-plan and conveys that all of her questions have been answered.   I have also provided discharge instructions for her that include: educational information regarding their diagnosis and

## 2023-10-23 NOTE — H&P
Subjective:      Sol Fitzpatrick is a 58 y.o. female who is for evaluation of abdominal pain. The pain is located in the diffusely. Pain is described as cramping, and measures 8/10 in intensity. Onset of pain was 1 day ago. Aggravating factors include activity and movement. Alleviating factors include none. Associated symptoms include anorexia. She denies nausea and vomiting. She denies a change in bowel habits. Patient states she has chronic spasticity including the smooth muscle. She see neurology and takes valium to relieve her symptoms. She has had a previous admission for SBO that resolved with bowel rest.     Previous surgery is and open hysterectomy     Past Medical History:   Diagnosis Date    Salivary gland stone     Sleep difficulties     Small bowel obstruction ([address]) 3/30/2014    Stiff person syndrome 2010    Dr. Shankar Graham neurologist     Past Surgical History:   Procedure Laterality Date    HYSTERECTOMY (CERVIX STATUS UNKNOWN)      AR APPENDECTOMY      SALPINGO-OOPHORECTOMY       Family History   Problem Relation Age of Onset    Heart Disease Father     Dementia Mother     Diabetes Other         maternal great aunt    Thyroid Disease Neg Hx      Social History     Socioeconomic History    Marital status:    Tobacco Use    Smoking status: Never    Smokeless tobacco: Never   Vaping Use    Vaping Use: Never used   Substance and Sexual Activity    Alcohol use: Not Currently    Drug use: No   Social History Narrative    Lives in [address] [place] [address] Road with  of 12 years. Has 1 daughter who is 27. Used to work with her  with his [employer] business. Likes to read. No current facility-administered medications on file prior to encounter.      Current Outpatient Medications on File Prior to Encounter   Medication Sig Dispense Refill    Omega-3 Fatty Acids (FISH OIL) 1000 MG capsule Take by mouth daily as needed      Multiple Vitamins-Minerals (THERAPEUTIC MULTIVITAMIN-MINERALS)

## 2023-10-23 NOTE — ED NOTES
0913 Pt comes from triage for acute abdominal pain with hx of SBO.       1145 pt states no relief from Pain medication MD notified. [address] NGT with little difficulty. 1540 NGT in satisfactory position.       Som Whiteside RN  10/23/23 5237

## 2023-10-23 NOTE — ED NOTES
TRANSFER - OUT REPORT:    Verbal report given to [address] [facility] [address]  on Kvng Pierce  being transferred to [facility] [phone] for routine progression of patient care       Report consisted of patient's Situation, Background, Assessment and   Recommendations(SBAR). Information from the following report(s) Nurse Handoff Report, Index, and ED Encounter Summary was reviewed with the receiving nurse. [place] Fall Assessment:    Presents to emergency department  because of falls (Syncope, seizure, or loss of consciousness): No  Age > 70: No  Altered Mental Status, Intoxication with alcohol or substance confusion (Disorientation, impaired judgment, poor safety awaremess, or inability to follow instructions): No  Impaired Mobility: Ambulates or transfers with assistive devices or assistance; Unable to ambulate or transer.: No  Nursing Judgement: No          Lines:   Peripheral IV 10/23/23 Left;Proximal Forearm (Active)        Opportunity for questions and clarification was provided.       Patient transported with:  Irvin Aguirre RN  10/23/23 6193

## 2023-10-24 ENCOUNTER — APPOINTMENT (OUTPATIENT)
Facility: HOSPITAL | Age: 63
DRG: 336 | End: 2023-10-24
Payer: COMMERCIAL

## 2023-10-24 LAB
ANION GAP SERPL CALC-SCNC: 6 MMOL/L (ref 5–15)
BASOPHILS # BLD: 0 K/UL (ref 0–0.1)
BASOPHILS NFR BLD: 1 % (ref 0–1)
BUN SERPL-MCNC: 6 MG/DL (ref 6–20)
BUN/CREAT SERPL: 11 (ref 12–20)
CALCIUM SERPL-MCNC: 8.1 MG/DL (ref 8.5–10.1)
CHLORIDE SERPL-SCNC: 107 MMOL/L (ref 97–108)
CO2 SERPL-SCNC: 23 MMOL/L (ref 21–32)
CREAT SERPL-MCNC: 0.53 MG/DL (ref 0.55–1.02)
DIFFERENTIAL METHOD BLD: ABNORMAL
EOSINOPHIL # BLD: 0.1 K/UL (ref 0–0.4)
EOSINOPHIL NFR BLD: 1 % (ref 0–7)
ERYTHROCYTE [DISTWIDTH] IN BLOOD BY AUTOMATED COUNT: 11.5 % (ref 11.5–14.5)
GLUCOSE SERPL-MCNC: 163 MG/DL (ref 65–100)
HCT VFR BLD AUTO: 34.2 % (ref 35–47)
HGB BLD-MCNC: 11.2 G/DL (ref 11.5–16)
IMM GRANULOCYTES # BLD AUTO: 0 K/UL (ref 0–0.04)
IMM GRANULOCYTES NFR BLD AUTO: 0 % (ref 0–0.5)
LACTATE SERPL-SCNC: 0.5 MMOL/L (ref 0.4–2)
LYMPHOCYTES # BLD: 1.1 K/UL (ref 0.8–3.5)
LYMPHOCYTES NFR BLD: 15 % (ref 12–49)
MAGNESIUM SERPL-MCNC: 2.2 MG/DL (ref 1.6–2.4)
MCH RBC QN AUTO: 31.5 PG (ref 26–34)
MCHC RBC AUTO-ENTMCNC: 32.7 G/DL (ref 30–36.5)
MCV RBC AUTO: 96.1 FL (ref 80–99)
MONOCYTES # BLD: 0.6 K/UL (ref 0–1)
MONOCYTES NFR BLD: 8 % (ref 5–13)
NEUTS SEG # BLD: 5.6 K/UL (ref 1.8–8)
NEUTS SEG NFR BLD: 75 % (ref 32–75)
NRBC # BLD: 0 K/UL (ref 0–0.01)
NRBC BLD-RTO: 0 PER 100 WBC
PHOSPHATE SERPL-MCNC: 2.9 MG/DL (ref 2.6–4.7)
PLATELET # BLD AUTO: 337 K/UL (ref 150–400)
PMV BLD AUTO: 8.9 FL (ref 8.9–12.9)
POTASSIUM SERPL-SCNC: 3.9 MMOL/L (ref 3.5–5.1)
RBC # BLD AUTO: 3.56 M/UL (ref 3.8–5.2)
SODIUM SERPL-SCNC: 136 MMOL/L (ref 136–145)
WBC # BLD AUTO: 7.4 K/UL (ref 3.6–11)

## 2023-10-24 PROCEDURE — 83735 ASSAY OF MAGNESIUM: CPT

## 2023-10-24 PROCEDURE — 84100 ASSAY OF PHOSPHORUS: CPT

## 2023-10-24 PROCEDURE — 6360000002 HC RX W HCPCS: Performed by: SURGERY

## 2023-10-24 PROCEDURE — 36415 COLL VENOUS BLD VENIPUNCTURE: CPT

## 2023-10-24 PROCEDURE — 74018 RADEX ABDOMEN 1 VIEW: CPT

## 2023-10-24 PROCEDURE — 1100000000 HC RM PRIVATE

## 2023-10-24 PROCEDURE — 80048 BASIC METABOLIC PNL TOTAL CA: CPT

## 2023-10-24 PROCEDURE — 2500000003 HC RX 250 WO HCPCS: Performed by: SURGERY

## 2023-10-24 PROCEDURE — 85025 COMPLETE CBC W/AUTO DIFF WBC: CPT

## 2023-10-24 PROCEDURE — 6360000004 HC RX CONTRAST MEDICATION: Performed by: SURGERY

## 2023-10-24 PROCEDURE — 2580000003 HC RX 258: Performed by: SURGERY

## 2023-10-24 PROCEDURE — 99231 SBSQ HOSP IP/OBS SF/LOW 25: CPT | Performed by: SURGERY

## 2023-10-24 PROCEDURE — 83605 ASSAY OF LACTIC ACID: CPT

## 2023-10-24 RX ORDER — KETOROLAC TROMETHAMINE 30 MG/ML
30 INJECTION, SOLUTION INTRAMUSCULAR; INTRAVENOUS EVERY 6 HOURS
Status: COMPLETED | OUTPATIENT
Start: 2023-10-24 | End: 2023-10-25

## 2023-10-24 RX ADMIN — HYDROMORPHONE HYDROCHLORIDE 0.5 MG: 1 INJECTION, SOLUTION INTRAMUSCULAR; INTRAVENOUS; SUBCUTANEOUS at 03:13

## 2023-10-24 RX ADMIN — HYDROMORPHONE HYDROCHLORIDE 0.5 MG: 1 INJECTION, SOLUTION INTRAMUSCULAR; INTRAVENOUS; SUBCUTANEOUS at 22:12

## 2023-10-24 RX ADMIN — DEXTROSE MONOHYDRATE, SODIUM CHLORIDE, AND POTASSIUM CHLORIDE: 50; 4.5; 1.49 INJECTION, SOLUTION INTRAVENOUS at 15:28

## 2023-10-24 RX ADMIN — DEXTROSE MONOHYDRATE, SODIUM CHLORIDE, AND POTASSIUM CHLORIDE: 50; 4.5; 1.49 INJECTION, SOLUTION INTRAVENOUS at 23:14

## 2023-10-24 RX ADMIN — LORAZEPAM 1 MG: 2 INJECTION INTRAMUSCULAR; INTRAVENOUS at 13:59

## 2023-10-24 RX ADMIN — LORAZEPAM 1 MG: 2 INJECTION INTRAMUSCULAR; INTRAVENOUS at 05:56

## 2023-10-24 RX ADMIN — DEXTROSE MONOHYDRATE, SODIUM CHLORIDE, AND POTASSIUM CHLORIDE: 50; 4.5; 1.49 INJECTION, SOLUTION INTRAVENOUS at 08:26

## 2023-10-24 RX ADMIN — HYDROMORPHONE HYDROCHLORIDE 0.5 MG: 1 INJECTION, SOLUTION INTRAMUSCULAR; INTRAVENOUS; SUBCUTANEOUS at 12:18

## 2023-10-24 RX ADMIN — KETOROLAC TROMETHAMINE 30 MG: 30 INJECTION, SOLUTION INTRAMUSCULAR; INTRAVENOUS at 22:04

## 2023-10-24 RX ADMIN — KETOROLAC TROMETHAMINE 30 MG: 30 INJECTION, SOLUTION INTRAMUSCULAR; INTRAVENOUS at 16:37

## 2023-10-24 RX ADMIN — HYDROMORPHONE HYDROCHLORIDE 0.5 MG: 1 INJECTION, SOLUTION INTRAMUSCULAR; INTRAVENOUS; SUBCUTANEOUS at 18:38

## 2023-10-24 RX ADMIN — HYDROMORPHONE HYDROCHLORIDE 0.5 MG: 1 INJECTION, SOLUTION INTRAMUSCULAR; INTRAVENOUS; SUBCUTANEOUS at 15:28

## 2023-10-24 RX ADMIN — ENOXAPARIN SODIUM 40 MG: 100 INJECTION SUBCUTANEOUS at 09:37

## 2023-10-24 RX ADMIN — LORAZEPAM 1 MG: 2 INJECTION INTRAMUSCULAR; INTRAVENOUS at 20:17

## 2023-10-24 RX ADMIN — SODIUM CHLORIDE, PRESERVATIVE FREE 10 ML: 5 INJECTION INTRAVENOUS at 20:17

## 2023-10-24 RX ADMIN — HYDROMORPHONE HYDROCHLORIDE 0.5 MG: 1 INJECTION, SOLUTION INTRAMUSCULAR; INTRAVENOUS; SUBCUTANEOUS at 08:23

## 2023-10-24 RX ADMIN — DIATRIZOATE MEGLUMINE AND DIATRIZOATE SODIUM 80 ML: 660; 100 LIQUID ORAL; RECTAL at 13:30

## 2023-10-24 ASSESSMENT — PAIN - FUNCTIONAL ASSESSMENT
PAIN_FUNCTIONAL_ASSESSMENT: PREVENTS OR INTERFERES SOME ACTIVE ACTIVITIES AND ADLS
PAIN_FUNCTIONAL_ASSESSMENT: ACTIVITIES ARE NOT PREVENTED

## 2023-10-24 ASSESSMENT — PAIN DESCRIPTION - LOCATION
LOCATION: ABDOMEN

## 2023-10-24 ASSESSMENT — PAIN DESCRIPTION - FREQUENCY: FREQUENCY: INTERMITTENT

## 2023-10-24 ASSESSMENT — PAIN DESCRIPTION - DESCRIPTORS
DESCRIPTORS: ACHING
DESCRIPTORS: STABBING
DESCRIPTORS: ACHING
DESCRIPTORS: ACHING;BURNING
DESCRIPTORS: ACHING
DESCRIPTORS: STABBING;THROBBING

## 2023-10-24 ASSESSMENT — PAIN SCALES - GENERAL
PAINLEVEL_OUTOF10: 10
PAINLEVEL_OUTOF10: 10
PAINLEVEL_OUTOF10: 9
PAINLEVEL_OUTOF10: 10
PAINLEVEL_OUTOF10: 10
PAINLEVEL_OUTOF10: 9

## 2023-10-24 ASSESSMENT — PAIN DESCRIPTION - PAIN TYPE: TYPE: ACUTE PAIN

## 2023-10-24 ASSESSMENT — PAIN DESCRIPTION - ORIENTATION
ORIENTATION: RIGHT;LEFT;ANTERIOR
ORIENTATION: LEFT;LOWER

## 2023-10-24 ASSESSMENT — PAIN DESCRIPTION - ONSET: ONSET: ON-GOING

## 2023-10-24 NOTE — PROGRESS NOTES
End of Shift Note    Bedside shift change report given to Vonda Christian RN (oncoming nurse) by Fidelia Zhnag LPN (offgoing nurse). Report included the following information SBAR, Kardex, Intake/Output, MAR, and Recent Results    Shift worked:  7a-7p     Shift summary and any significant changes:     Pt given dilaudid x 4 for pain this shift, given ativan for anxiety x 1 this shift. Gastrografin given this shift, NG clamped for 4 hours after med given. KUB done this evening at bedside. Plan for X-Ray tomorrow.       Concerns for physician to address:  none     Zone phone for oncoming shift:   [phone]         Fidelia Zhang LPN

## 2023-10-24 NOTE — PROGRESS NOTES
Gastrografin Challenge in Small Bowel Obstructions    Purpose:   Gastrografin (diatrizoate meglumine-sodium) is a contrast media which is used in small bowel obstructions. It does two things for the patient:  First, it DIAGNOSTIC, meaning it provides visibility on X-Ray which helps determine the extent or improvement of the obstruction. Second, it is THERAPUETIC, meaning it helps to promote bowel function as an osmotic laxative, thereby relieving the obstruction. Process:  Surgeon will order the Gastrografin. This is to be treated like any other medication and ADMINISTERED AS ORDERED regardless of imaging/X-ray orders. The contrast should be obtained from the [facility] or PHARMACY. Do not call radiology for it. Remember, treat this as a medication, not just contrast.   Mix the medication as noted in the instructions and give to the patient either PO or via NG Tube (if they have one). If the patient has an NGT, clamp it for 4 hours following administration and then return to suction after four hours. Xrays will be ordered by the surgeon. These may be 4 hours after the Gastrografin or the next day depending on various patient factors. DO NOT WAIT FOR AN XRAY ORDER TO GIVE THE GASTROGRAFIN.  Often times the Xray order will not be placed at the same time as the Gastrografin and this is intentional.

## 2023-10-25 ENCOUNTER — APPOINTMENT (OUTPATIENT)
Facility: HOSPITAL | Age: 63
DRG: 336 | End: 2023-10-25
Payer: COMMERCIAL

## 2023-10-25 ENCOUNTER — ANESTHESIA (OUTPATIENT)
Facility: HOSPITAL | Age: 63
End: 2023-10-25
Payer: COMMERCIAL

## 2023-10-25 ENCOUNTER — ANESTHESIA EVENT (OUTPATIENT)
Facility: HOSPITAL | Age: 63
End: 2023-10-25
Payer: COMMERCIAL

## 2023-10-25 LAB
ANION GAP SERPL CALC-SCNC: 5 MMOL/L (ref 5–15)
BASOPHILS # BLD: 0 K/UL (ref 0–0.1)
BASOPHILS NFR BLD: 0 % (ref 0–1)
BUN SERPL-MCNC: 4 MG/DL (ref 6–20)
BUN/CREAT SERPL: 8 (ref 12–20)
CALCIUM SERPL-MCNC: 8.6 MG/DL (ref 8.5–10.1)
CHLORIDE SERPL-SCNC: 105 MMOL/L (ref 97–108)
CO2 SERPL-SCNC: 25 MMOL/L (ref 21–32)
CREAT SERPL-MCNC: 0.53 MG/DL (ref 0.55–1.02)
DIFFERENTIAL METHOD BLD: NORMAL
EOSINOPHIL # BLD: 0.1 K/UL (ref 0–0.4)
EOSINOPHIL NFR BLD: 1 % (ref 0–7)
ERYTHROCYTE [DISTWIDTH] IN BLOOD BY AUTOMATED COUNT: 11.5 % (ref 11.5–14.5)
GLUCOSE SERPL-MCNC: 144 MG/DL (ref 65–100)
HCT VFR BLD AUTO: 37.5 % (ref 35–47)
HGB BLD-MCNC: 12.4 G/DL (ref 11.5–16)
IMM GRANULOCYTES # BLD AUTO: 0 K/UL (ref 0–0.04)
IMM GRANULOCYTES NFR BLD AUTO: 0 % (ref 0–0.5)
LYMPHOCYTES # BLD: 1.5 K/UL (ref 0.8–3.5)
LYMPHOCYTES NFR BLD: 21 % (ref 12–49)
MAGNESIUM SERPL-MCNC: 2.2 MG/DL (ref 1.6–2.4)
MCH RBC QN AUTO: 31.5 PG (ref 26–34)
MCHC RBC AUTO-ENTMCNC: 33.1 G/DL (ref 30–36.5)
MCV RBC AUTO: 95.2 FL (ref 80–99)
MONOCYTES # BLD: 0.7 K/UL (ref 0–1)
MONOCYTES NFR BLD: 10 % (ref 5–13)
NEUTS SEG # BLD: 4.7 K/UL (ref 1.8–8)
NEUTS SEG NFR BLD: 68 % (ref 32–75)
NRBC # BLD: 0 K/UL (ref 0–0.01)
NRBC BLD-RTO: 0 PER 100 WBC
PHOSPHATE SERPL-MCNC: 3.1 MG/DL (ref 2.6–4.7)
PLATELET # BLD AUTO: 357 K/UL (ref 150–400)
PMV BLD AUTO: 9 FL (ref 8.9–12.9)
POTASSIUM SERPL-SCNC: 3.9 MMOL/L (ref 3.5–5.1)
RBC # BLD AUTO: 3.94 M/UL (ref 3.8–5.2)
SODIUM SERPL-SCNC: 135 MMOL/L (ref 136–145)
WBC # BLD AUTO: 7 K/UL (ref 3.6–11)

## 2023-10-25 PROCEDURE — 80048 BASIC METABOLIC PNL TOTAL CA: CPT

## 2023-10-25 PROCEDURE — 2580000003 HC RX 258: Performed by: NURSE ANESTHETIST, CERTIFIED REGISTERED

## 2023-10-25 PROCEDURE — 84100 ASSAY OF PHOSPHORUS: CPT

## 2023-10-25 PROCEDURE — 3600000004 HC SURGERY LEVEL 4 BASE: Performed by: SURGERY

## 2023-10-25 PROCEDURE — 1100000000 HC RM PRIVATE

## 2023-10-25 PROCEDURE — 2709999900 HC NON-CHARGEABLE SUPPLY: Performed by: SURGERY

## 2023-10-25 PROCEDURE — 2500000003 HC RX 250 WO HCPCS: Performed by: ANESTHESIOLOGY

## 2023-10-25 PROCEDURE — 36415 COLL VENOUS BLD VENIPUNCTURE: CPT

## 2023-10-25 PROCEDURE — 2580000003 HC RX 258: Performed by: ANESTHESIOLOGY

## 2023-10-25 PROCEDURE — 74019 RADEX ABDOMEN 2 VIEWS: CPT

## 2023-10-25 PROCEDURE — 2500000003 HC RX 250 WO HCPCS: Performed by: SURGERY

## 2023-10-25 PROCEDURE — 3700000000 HC ANESTHESIA ATTENDED CARE: Performed by: SURGERY

## 2023-10-25 PROCEDURE — 2720000010 HC SURG SUPPLY STERILE: Performed by: SURGERY

## 2023-10-25 PROCEDURE — 2580000003 HC RX 258: Performed by: SURGERY

## 2023-10-25 PROCEDURE — 44005 FREEING OF BOWEL ADHESION: CPT | Performed by: SURGERY

## 2023-10-25 PROCEDURE — 6360000002 HC RX W HCPCS: Performed by: SURGERY

## 2023-10-25 PROCEDURE — 83735 ASSAY OF MAGNESIUM: CPT

## 2023-10-25 PROCEDURE — 2500000003 HC RX 250 WO HCPCS: Performed by: NURSE ANESTHETIST, CERTIFIED REGISTERED

## 2023-10-25 PROCEDURE — C1765 ADHESION BARRIER: HCPCS | Performed by: SURGERY

## 2023-10-25 PROCEDURE — 7100000000 HC PACU RECOVERY - FIRST 15 MIN: Performed by: SURGERY

## 2023-10-25 PROCEDURE — 7100000001 HC PACU RECOVERY - ADDTL 15 MIN: Performed by: SURGERY

## 2023-10-25 PROCEDURE — 0DN80ZZ RELEASE SMALL INTESTINE, OPEN APPROACH: ICD-10-PCS | Performed by: SURGERY

## 2023-10-25 PROCEDURE — 6360000002 HC RX W HCPCS: Performed by: NURSE ANESTHETIST, CERTIFIED REGISTERED

## 2023-10-25 PROCEDURE — 85025 COMPLETE CBC W/AUTO DIFF WBC: CPT

## 2023-10-25 PROCEDURE — 3700000001 HC ADD 15 MINUTES (ANESTHESIA): Performed by: SURGERY

## 2023-10-25 PROCEDURE — 3600000014 HC SURGERY LEVEL 4 ADDTL 15MIN: Performed by: SURGERY

## 2023-10-25 PROCEDURE — 6360000002 HC RX W HCPCS: Performed by: ANESTHESIOLOGY

## 2023-10-25 DEVICE — BARRIER, ABSORBABLE, ADHESION
Type: IMPLANTABLE DEVICE | Site: ABDOMEN | Status: FUNCTIONAL
Brand: SEPRAFILM®

## 2023-10-25 DEVICE — BARRIER ADH ABSRB GYNECARE INTCEED ST L6XW5IN: Type: IMPLANTABLE DEVICE | Site: ABDOMEN | Status: FUNCTIONAL

## 2023-10-25 RX ORDER — SODIUM CHLORIDE 0.9 % (FLUSH) 0.9 %
5-40 SYRINGE (ML) INJECTION PRN
Status: DISCONTINUED | OUTPATIENT
Start: 2023-10-25 | End: 2023-10-26 | Stop reason: HOSPADM

## 2023-10-25 RX ORDER — FENTANYL CITRATE 50 UG/ML
50 INJECTION, SOLUTION INTRAMUSCULAR; INTRAVENOUS EVERY 5 MIN PRN
Status: COMPLETED | OUTPATIENT
Start: 2023-10-25 | End: 2023-10-25

## 2023-10-25 RX ORDER — SODIUM CHLORIDE 0.9 % (FLUSH) 0.9 %
5-40 SYRINGE (ML) INJECTION EVERY 12 HOURS SCHEDULED
Status: DISCONTINUED | OUTPATIENT
Start: 2023-10-25 | End: 2023-10-26 | Stop reason: HOSPADM

## 2023-10-25 RX ORDER — PROCHLORPERAZINE EDISYLATE 5 MG/ML
5 INJECTION INTRAMUSCULAR; INTRAVENOUS
Status: DISCONTINUED | OUTPATIENT
Start: 2023-10-25 | End: 2023-10-26 | Stop reason: HOSPADM

## 2023-10-25 RX ORDER — SUCCINYLCHOLINE CHLORIDE 20 MG/ML
INJECTION INTRAMUSCULAR; INTRAVENOUS PRN
Status: DISCONTINUED | OUTPATIENT
Start: 2023-10-25 | End: 2023-10-25 | Stop reason: SDUPTHER

## 2023-10-25 RX ORDER — MIDAZOLAM HYDROCHLORIDE 1 MG/ML
INJECTION INTRAMUSCULAR; INTRAVENOUS PRN
Status: DISCONTINUED | OUTPATIENT
Start: 2023-10-25 | End: 2023-10-25 | Stop reason: SDUPTHER

## 2023-10-25 RX ORDER — SODIUM CHLORIDE 9 MG/ML
INJECTION, SOLUTION INTRAVENOUS PRN
Status: DISCONTINUED | OUTPATIENT
Start: 2023-10-25 | End: 2023-10-26 | Stop reason: HOSPADM

## 2023-10-25 RX ORDER — LIDOCAINE HYDROCHLORIDE 20 MG/ML
INJECTION, SOLUTION EPIDURAL; INFILTRATION; INTRACAUDAL; PERINEURAL PRN
Status: DISCONTINUED | OUTPATIENT
Start: 2023-10-25 | End: 2023-10-25 | Stop reason: SDUPTHER

## 2023-10-25 RX ORDER — PHENYLEPHRINE HYDROCHLORIDE 10 MG/ML
INJECTION INTRAVENOUS PRN
Status: DISCONTINUED | OUTPATIENT
Start: 2023-10-25 | End: 2023-10-25 | Stop reason: SDUPTHER

## 2023-10-25 RX ORDER — FENTANYL CITRATE 50 UG/ML
INJECTION, SOLUTION INTRAMUSCULAR; INTRAVENOUS PRN
Status: DISCONTINUED | OUTPATIENT
Start: 2023-10-25 | End: 2023-10-25 | Stop reason: SDUPTHER

## 2023-10-25 RX ORDER — ONDANSETRON 2 MG/ML
INJECTION INTRAMUSCULAR; INTRAVENOUS PRN
Status: DISCONTINUED | OUTPATIENT
Start: 2023-10-25 | End: 2023-10-25 | Stop reason: SDUPTHER

## 2023-10-25 RX ORDER — CEFAZOLIN SODIUM 1 G/3ML
INJECTION, POWDER, FOR SOLUTION INTRAMUSCULAR; INTRAVENOUS PRN
Status: DISCONTINUED | OUTPATIENT
Start: 2023-10-25 | End: 2023-10-25 | Stop reason: SDUPTHER

## 2023-10-25 RX ORDER — ROCURONIUM BROMIDE 10 MG/ML
INJECTION, SOLUTION INTRAVENOUS PRN
Status: DISCONTINUED | OUTPATIENT
Start: 2023-10-25 | End: 2023-10-25 | Stop reason: SDUPTHER

## 2023-10-25 RX ORDER — PROPOFOL 10 MG/ML
INJECTION, EMULSION INTRAVENOUS PRN
Status: DISCONTINUED | OUTPATIENT
Start: 2023-10-25 | End: 2023-10-25 | Stop reason: SDUPTHER

## 2023-10-25 RX ORDER — HYDROMORPHONE HYDROCHLORIDE 1 MG/ML
0.25 INJECTION, SOLUTION INTRAMUSCULAR; INTRAVENOUS; SUBCUTANEOUS EVERY 5 MIN PRN
Status: COMPLETED | OUTPATIENT
Start: 2023-10-25 | End: 2023-10-25

## 2023-10-25 RX ORDER — SODIUM CHLORIDE, SODIUM LACTATE, POTASSIUM CHLORIDE, CALCIUM CHLORIDE 600; 310; 30; 20 MG/100ML; MG/100ML; MG/100ML; MG/100ML
INJECTION, SOLUTION INTRAVENOUS CONTINUOUS PRN
Status: DISCONTINUED | OUTPATIENT
Start: 2023-10-25 | End: 2023-10-25 | Stop reason: SDUPTHER

## 2023-10-25 RX ORDER — DEXMEDETOMIDINE HYDROCHLORIDE 100 UG/ML
INJECTION, SOLUTION INTRAVENOUS PRN
Status: DISCONTINUED | OUTPATIENT
Start: 2023-10-25 | End: 2023-10-25 | Stop reason: SDUPTHER

## 2023-10-25 RX ADMIN — SODIUM CHLORIDE, PRESERVATIVE FREE 10 ML: 5 INJECTION INTRAVENOUS at 08:37

## 2023-10-25 RX ADMIN — HYDROMORPHONE HYDROCHLORIDE 0.5 MG: 1 INJECTION, SOLUTION INTRAMUSCULAR; INTRAVENOUS; SUBCUTANEOUS at 13:54

## 2023-10-25 RX ADMIN — PHENYLEPHRINE HYDROCHLORIDE 80 MCG: 10 INJECTION INTRAVENOUS at 21:11

## 2023-10-25 RX ADMIN — DEXTROSE MONOHYDRATE, SODIUM CHLORIDE, AND POTASSIUM CHLORIDE: 50; 4.5; 1.49 INJECTION, SOLUTION INTRAVENOUS at 06:16

## 2023-10-25 RX ADMIN — FENTANYL CITRATE 25 MCG: 50 INJECTION, SOLUTION INTRAMUSCULAR; INTRAVENOUS at 21:21

## 2023-10-25 RX ADMIN — SUCCINYLCHOLINE CHLORIDE 120 MG: 20 INJECTION, SOLUTION INTRAMUSCULAR; INTRAVENOUS at 21:11

## 2023-10-25 RX ADMIN — HYDROMORPHONE HYDROCHLORIDE 0.25 MG: 1 INJECTION, SOLUTION INTRAMUSCULAR; INTRAVENOUS; SUBCUTANEOUS at 23:30

## 2023-10-25 RX ADMIN — DEXMEDETOMIDINE HYDROCHLORIDE 10 MCG: 100 INJECTION, SOLUTION, CONCENTRATE INTRAVENOUS at 23:05

## 2023-10-25 RX ADMIN — ROCURONIUM BROMIDE 25 MG: 10 INJECTION INTRAVENOUS at 21:15

## 2023-10-25 RX ADMIN — SUGAMMADEX 100 MG: 100 INJECTION, SOLUTION INTRAVENOUS at 22:45

## 2023-10-25 RX ADMIN — CEFAZOLIN 2 G: 1 INJECTION, POWDER, FOR SOLUTION INTRAMUSCULAR; INTRAVENOUS at 21:16

## 2023-10-25 RX ADMIN — SODIUM CHLORIDE, PRESERVATIVE FREE 10 ML: 5 INJECTION INTRAVENOUS at 23:37

## 2023-10-25 RX ADMIN — FENTANYL CITRATE 25 MCG: 50 INJECTION, SOLUTION INTRAMUSCULAR; INTRAVENOUS at 21:11

## 2023-10-25 RX ADMIN — MIDAZOLAM HYDROCHLORIDE 1 MG: 1 INJECTION, SOLUTION INTRAMUSCULAR; INTRAVENOUS at 21:05

## 2023-10-25 RX ADMIN — FENTANYL CITRATE 25 MCG: 50 INJECTION, SOLUTION INTRAMUSCULAR; INTRAVENOUS at 21:30

## 2023-10-25 RX ADMIN — SODIUM CHLORIDE, POTASSIUM CHLORIDE, SODIUM LACTATE AND CALCIUM CHLORIDE: 600; 310; 30; 20 INJECTION, SOLUTION INTRAVENOUS at 21:05

## 2023-10-25 RX ADMIN — HYDROMORPHONE HYDROCHLORIDE 0.5 MG: 1 INJECTION, SOLUTION INTRAMUSCULAR; INTRAVENOUS; SUBCUTANEOUS at 10:47

## 2023-10-25 RX ADMIN — KETOROLAC TROMETHAMINE 30 MG: 30 INJECTION, SOLUTION INTRAMUSCULAR; INTRAVENOUS at 15:42

## 2023-10-25 RX ADMIN — DEXTROSE MONOHYDRATE, SODIUM CHLORIDE, AND POTASSIUM CHLORIDE: 50; 4.5; 1.49 INJECTION, SOLUTION INTRAVENOUS at 15:36

## 2023-10-25 RX ADMIN — LORAZEPAM 1 MG: 2 INJECTION INTRAMUSCULAR; INTRAVENOUS at 03:01

## 2023-10-25 RX ADMIN — HYDROMORPHONE HYDROCHLORIDE 0.5 MG: 1 INJECTION, SOLUTION INTRAMUSCULAR; INTRAVENOUS; SUBCUTANEOUS at 20:16

## 2023-10-25 RX ADMIN — FENTANYL CITRATE 50 MCG: 50 INJECTION, SOLUTION INTRAMUSCULAR; INTRAVENOUS at 23:03

## 2023-10-25 RX ADMIN — HYDROMORPHONE HYDROCHLORIDE 0.25 MG: 1 INJECTION, SOLUTION INTRAMUSCULAR; INTRAVENOUS; SUBCUTANEOUS at 23:45

## 2023-10-25 RX ADMIN — ONDANSETRON HYDROCHLORIDE 4 MG: 2 INJECTION, SOLUTION INTRAMUSCULAR; INTRAVENOUS at 22:15

## 2023-10-25 RX ADMIN — SODIUM CHLORIDE, PRESERVATIVE FREE 10 ML: 5 INJECTION INTRAVENOUS at 23:36

## 2023-10-25 RX ADMIN — DEXMEDETOMIDINE HYDROCHLORIDE 10 MCG: 100 INJECTION, SOLUTION, CONCENTRATE INTRAVENOUS at 23:12

## 2023-10-25 RX ADMIN — KETOROLAC TROMETHAMINE 30 MG: 30 INJECTION, SOLUTION INTRAMUSCULAR; INTRAVENOUS at 04:58

## 2023-10-25 RX ADMIN — ENOXAPARIN SODIUM 40 MG: 100 INJECTION SUBCUTANEOUS at 08:36

## 2023-10-25 RX ADMIN — FENTANYL CITRATE 50 MCG: 50 INJECTION INTRAMUSCULAR; INTRAVENOUS at 23:30

## 2023-10-25 RX ADMIN — FENTANYL CITRATE 50 MCG: 50 INJECTION, SOLUTION INTRAMUSCULAR; INTRAVENOUS at 23:10

## 2023-10-25 RX ADMIN — PHENYLEPHRINE HYDROCHLORIDE 80 MCG: 10 INJECTION INTRAVENOUS at 21:18

## 2023-10-25 RX ADMIN — HYDROMORPHONE HYDROCHLORIDE 0.5 MG: 1 INJECTION, SOLUTION INTRAMUSCULAR; INTRAVENOUS; SUBCUTANEOUS at 01:27

## 2023-10-25 RX ADMIN — PHENYLEPHRINE HYDROCHLORIDE 120 MCG: 10 INJECTION INTRAVENOUS at 21:15

## 2023-10-25 RX ADMIN — HYDROMORPHONE HYDROCHLORIDE 0.5 MG: 1 INJECTION, SOLUTION INTRAMUSCULAR; INTRAVENOUS; SUBCUTANEOUS at 06:11

## 2023-10-25 RX ADMIN — MIDAZOLAM HYDROCHLORIDE 1 MG: 1 INJECTION, SOLUTION INTRAMUSCULAR; INTRAVENOUS at 21:07

## 2023-10-25 RX ADMIN — PHENYLEPHRINE HYDROCHLORIDE 80 MCG: 10 INJECTION INTRAVENOUS at 22:20

## 2023-10-25 RX ADMIN — FENTANYL CITRATE 25 MCG: 50 INJECTION, SOLUTION INTRAMUSCULAR; INTRAVENOUS at 22:15

## 2023-10-25 RX ADMIN — LIDOCAINE HYDROCHLORIDE 60 MG: 20 INJECTION, SOLUTION EPIDURAL; INFILTRATION; INTRACAUDAL; PERINEURAL at 21:11

## 2023-10-25 RX ADMIN — HYDROMORPHONE HYDROCHLORIDE 0.5 MG: 1 INJECTION, SOLUTION INTRAMUSCULAR; INTRAVENOUS; SUBCUTANEOUS at 17:14

## 2023-10-25 RX ADMIN — KETOROLAC TROMETHAMINE 30 MG: 30 INJECTION, SOLUTION INTRAMUSCULAR; INTRAVENOUS at 10:47

## 2023-10-25 RX ADMIN — FENTANYL CITRATE 50 MCG: 50 INJECTION INTRAMUSCULAR; INTRAVENOUS at 23:35

## 2023-10-25 RX ADMIN — ROCURONIUM BROMIDE 5 MG: 10 INJECTION INTRAVENOUS at 21:11

## 2023-10-25 RX ADMIN — PROPOFOL 100 MG: 10 INJECTION, EMULSION INTRAVENOUS at 21:11

## 2023-10-25 ASSESSMENT — PAIN SCALES - GENERAL
PAINLEVEL_OUTOF10: 7
PAINLEVEL_OUTOF10: 7
PAINLEVEL_OUTOF10: 9
PAINLEVEL_OUTOF10: 3
PAINLEVEL_OUTOF10: 7
PAINLEVEL_OUTOF10: 6
PAINLEVEL_OUTOF10: 10
PAINLEVEL_OUTOF10: 8
PAINLEVEL_OUTOF10: 5
PAINLEVEL_OUTOF10: 10
PAINLEVEL_OUTOF10: 10

## 2023-10-25 ASSESSMENT — PAIN DESCRIPTION - ORIENTATION
ORIENTATION: RIGHT;LEFT
ORIENTATION: MID
ORIENTATION: ANTERIOR
ORIENTATION: ANTERIOR
ORIENTATION: MID
ORIENTATION: ANTERIOR
ORIENTATION: MID
ORIENTATION: MID
ORIENTATION: LEFT;RIGHT
ORIENTATION: MID
ORIENTATION: MID
ORIENTATION: RIGHT;LEFT
ORIENTATION: MID

## 2023-10-25 ASSESSMENT — PAIN DESCRIPTION - DESCRIPTORS
DESCRIPTORS: ACHING
DESCRIPTORS: SHARP
DESCRIPTORS: ACHING
DESCRIPTORS: CRUSHING;CRAMPING
DESCRIPTORS: CRAMPING
DESCRIPTORS: ACHING
DESCRIPTORS: ACHING
DESCRIPTORS: CRAMPING
DESCRIPTORS: ACHING

## 2023-10-25 ASSESSMENT — PAIN DESCRIPTION - LOCATION
LOCATION: ABDOMEN

## 2023-10-25 ASSESSMENT — PAIN DESCRIPTION - PAIN TYPE
TYPE: ACUTE PAIN

## 2023-10-25 ASSESSMENT — PAIN DESCRIPTION - FREQUENCY
FREQUENCY: CONTINUOUS

## 2023-10-25 ASSESSMENT — PAIN DESCRIPTION - ONSET
ONSET: ON-GOING

## 2023-10-25 ASSESSMENT — PAIN - FUNCTIONAL ASSESSMENT
PAIN_FUNCTIONAL_ASSESSMENT: ACTIVITIES ARE NOT PREVENTED

## 2023-10-25 NOTE — PROGRESS NOTES
7535 - Patient taken off floor to xray    1900 - Bedside shift change report given to [address] (oncoming nurse) by Nicol Hernandez (offgoing nurse). Report included the following information Nurse Handoff Report, Index, Adult Overview, Surgery Report, Intake/Output, MAR, Recent Results, and Med Rec Status.      Per OR charge, patient will be taken for surgery at 8:30 pm.

## 2023-10-25 NOTE — CARE COORDINATION
Care Management Initial Assessment       RUR: 9%  Readmission? No  1st IM letter given? No  1st  letter given: No     CM completed assessment w/pt via phone. No history of HH/Rehab or DME use. Patient uses [address]. Patient is expected to return home at d/c.  will transport. 10/25/23 7088   Service Assessment   Patient Orientation Alert and Oriented   Cognition Alert   History Provided By Patient   Primary Caregiver Self   Support Systems Spouse/Significant Other;Family Members;Friends/Neighbors   PCP Verified by CM Yes   Last Visit to PCP Within last year   Prior Functional Level Independent in ADLs/IADLs   Current Functional Level Independent in ADLs/IADLs   Can patient return to prior living arrangement Yes   Family able to assist with home care needs: Yes   Would you like for me to discuss the discharge plan with any other family members/significant others, and if so, who?  No   Community Resources Other (Comment)  ([employer])   Social/Functional History   Lives With Spouse   Type of Home House  (two story home w/3 FLASH)   Home Equipment None   Active  Yes     Erla [place]  Ext [phone]

## 2023-10-26 LAB
ANION GAP SERPL CALC-SCNC: 4 MMOL/L (ref 5–15)
BASOPHILS # BLD: 0 K/UL (ref 0–0.1)
BASOPHILS NFR BLD: 0 % (ref 0–1)
BUN SERPL-MCNC: 7 MG/DL (ref 6–20)
BUN/CREAT SERPL: 11 (ref 12–20)
CALCIUM SERPL-MCNC: 8 MG/DL (ref 8.5–10.1)
CHLORIDE SERPL-SCNC: 103 MMOL/L (ref 97–108)
CO2 SERPL-SCNC: 24 MMOL/L (ref 21–32)
COMMENT:: NORMAL
CREAT SERPL-MCNC: 0.64 MG/DL (ref 0.55–1.02)
DIFFERENTIAL METHOD BLD: NORMAL
EOSINOPHIL # BLD: 0 K/UL (ref 0–0.4)
EOSINOPHIL NFR BLD: 0 % (ref 0–7)
ERYTHROCYTE [DISTWIDTH] IN BLOOD BY AUTOMATED COUNT: 11.6 % (ref 11.5–14.5)
GLUCOSE SERPL-MCNC: 181 MG/DL (ref 65–100)
HCT VFR BLD AUTO: 39.8 % (ref 35–47)
HGB BLD-MCNC: 13 G/DL (ref 11.5–16)
IMM GRANULOCYTES # BLD AUTO: 0 K/UL (ref 0–0.04)
IMM GRANULOCYTES NFR BLD AUTO: 0 % (ref 0–0.5)
LYMPHOCYTES # BLD: 0.8 K/UL (ref 0.8–3.5)
LYMPHOCYTES NFR BLD: 15 % (ref 12–49)
MAGNESIUM SERPL-MCNC: 1.7 MG/DL (ref 1.6–2.4)
MCH RBC QN AUTO: 31.4 PG (ref 26–34)
MCHC RBC AUTO-ENTMCNC: 32.7 G/DL (ref 30–36.5)
MCV RBC AUTO: 96.1 FL (ref 80–99)
MONOCYTES # BLD: 0.5 K/UL (ref 0–1)
MONOCYTES NFR BLD: 10 % (ref 5–13)
NEUTS SEG # BLD: 4 K/UL (ref 1.8–8)
NEUTS SEG NFR BLD: 75 % (ref 32–75)
NRBC # BLD: 0 K/UL (ref 0–0.01)
NRBC BLD-RTO: 0 PER 100 WBC
PHOSPHATE SERPL-MCNC: 3.4 MG/DL (ref 2.6–4.7)
PLATELET # BLD AUTO: 388 K/UL (ref 150–400)
PMV BLD AUTO: 9.1 FL (ref 8.9–12.9)
POTASSIUM SERPL-SCNC: 5 MMOL/L (ref 3.5–5.1)
RBC # BLD AUTO: 4.14 M/UL (ref 3.8–5.2)
RBC MORPH BLD: NORMAL
SODIUM SERPL-SCNC: 131 MMOL/L (ref 136–145)
SPECIMEN HOLD: NORMAL
WBC # BLD AUTO: 5.3 K/UL (ref 3.6–11)

## 2023-10-26 PROCEDURE — 94761 N-INVAS EAR/PLS OXIMETRY MLT: CPT

## 2023-10-26 PROCEDURE — 6370000000 HC RX 637 (ALT 250 FOR IP): Performed by: SURGERY

## 2023-10-26 PROCEDURE — 99024 POSTOP FOLLOW-UP VISIT: CPT | Performed by: NURSE PRACTITIONER

## 2023-10-26 PROCEDURE — 80048 BASIC METABOLIC PNL TOTAL CA: CPT

## 2023-10-26 PROCEDURE — 2580000003 HC RX 258: Performed by: SURGERY

## 2023-10-26 PROCEDURE — 83735 ASSAY OF MAGNESIUM: CPT

## 2023-10-26 PROCEDURE — 84100 ASSAY OF PHOSPHORUS: CPT

## 2023-10-26 PROCEDURE — 1100000000 HC RM PRIVATE

## 2023-10-26 PROCEDURE — 2500000003 HC RX 250 WO HCPCS: Performed by: SURGERY

## 2023-10-26 PROCEDURE — 2500000003 HC RX 250 WO HCPCS: Performed by: NURSE PRACTITIONER

## 2023-10-26 PROCEDURE — 6360000002 HC RX W HCPCS: Performed by: SURGERY

## 2023-10-26 PROCEDURE — 2700000000 HC OXYGEN THERAPY PER DAY

## 2023-10-26 PROCEDURE — 36415 COLL VENOUS BLD VENIPUNCTURE: CPT

## 2023-10-26 PROCEDURE — 85025 COMPLETE CBC W/AUTO DIFF WBC: CPT

## 2023-10-26 PROCEDURE — 2580000003 HC RX 258: Performed by: NURSE PRACTITIONER

## 2023-10-26 RX ORDER — HYDROMORPHONE HYDROCHLORIDE 1 MG/ML
1 INJECTION, SOLUTION INTRAMUSCULAR; INTRAVENOUS; SUBCUTANEOUS
Status: DISCONTINUED | OUTPATIENT
Start: 2023-10-26 | End: 2023-10-27

## 2023-10-26 RX ORDER — 0.9 % SODIUM CHLORIDE 0.9 %
1000 INTRAVENOUS SOLUTION INTRAVENOUS ONCE
Status: COMPLETED | OUTPATIENT
Start: 2023-10-26 | End: 2023-10-26

## 2023-10-26 RX ORDER — KETOROLAC TROMETHAMINE 30 MG/ML
30 INJECTION, SOLUTION INTRAMUSCULAR; INTRAVENOUS EVERY 6 HOURS
Status: COMPLETED | OUTPATIENT
Start: 2023-10-26 | End: 2023-10-27

## 2023-10-26 RX ORDER — DEXTROSE AND SODIUM CHLORIDE 5; .45 G/100ML; G/100ML
INJECTION, SOLUTION INTRAVENOUS CONTINUOUS
Status: DISCONTINUED | OUTPATIENT
Start: 2023-10-26 | End: 2023-10-27

## 2023-10-26 RX ADMIN — ENOXAPARIN SODIUM 40 MG: 100 INJECTION SUBCUTANEOUS at 15:06

## 2023-10-26 RX ADMIN — HYDROMORPHONE HYDROCHLORIDE 0.5 MG: 1 INJECTION, SOLUTION INTRAMUSCULAR; INTRAVENOUS; SUBCUTANEOUS at 02:38

## 2023-10-26 RX ADMIN — KETOROLAC TROMETHAMINE 30 MG: 30 INJECTION, SOLUTION INTRAMUSCULAR; INTRAVENOUS at 21:14

## 2023-10-26 RX ADMIN — HYDROMORPHONE HYDROCHLORIDE 1 MG: 1 INJECTION, SOLUTION INTRAMUSCULAR; INTRAVENOUS; SUBCUTANEOUS at 14:55

## 2023-10-26 RX ADMIN — LORAZEPAM 1 MG: 2 INJECTION INTRAMUSCULAR; INTRAVENOUS at 00:33

## 2023-10-26 RX ADMIN — HYDROMORPHONE HYDROCHLORIDE 1 MG: 1 INJECTION, SOLUTION INTRAMUSCULAR; INTRAVENOUS; SUBCUTANEOUS at 11:58

## 2023-10-26 RX ADMIN — HYDROMORPHONE HYDROCHLORIDE 1 MG: 1 INJECTION, SOLUTION INTRAMUSCULAR; INTRAVENOUS; SUBCUTANEOUS at 18:05

## 2023-10-26 RX ADMIN — KETOROLAC TROMETHAMINE 30 MG: 30 INJECTION, SOLUTION INTRAMUSCULAR; INTRAVENOUS at 08:53

## 2023-10-26 RX ADMIN — HYDROMORPHONE HYDROCHLORIDE 0.5 MG: 1 INJECTION, SOLUTION INTRAMUSCULAR; INTRAVENOUS; SUBCUTANEOUS at 05:47

## 2023-10-26 RX ADMIN — SODIUM CHLORIDE, PRESERVATIVE FREE 10 ML: 5 INJECTION INTRAVENOUS at 21:14

## 2023-10-26 RX ADMIN — SODIUM CHLORIDE 1000 ML: 9 INJECTION, SOLUTION INTRAVENOUS at 16:17

## 2023-10-26 RX ADMIN — HYDROMORPHONE HYDROCHLORIDE 1 MG: 1 INJECTION, SOLUTION INTRAMUSCULAR; INTRAVENOUS; SUBCUTANEOUS at 21:14

## 2023-10-26 RX ADMIN — DEXTROSE AND SODIUM CHLORIDE: 5; 450 INJECTION, SOLUTION INTRAVENOUS at 14:54

## 2023-10-26 RX ADMIN — DEXTROSE MONOHYDRATE, SODIUM CHLORIDE, AND POTASSIUM CHLORIDE: 50; 4.5; 1.49 INJECTION, SOLUTION INTRAVENOUS at 02:57

## 2023-10-26 RX ADMIN — ACETAMINOPHEN 650 MG: 160 SOLUTION ORAL at 00:35

## 2023-10-26 RX ADMIN — HYDROMORPHONE HYDROCHLORIDE 0.5 MG: 1 INJECTION, SOLUTION INTRAMUSCULAR; INTRAVENOUS; SUBCUTANEOUS at 08:53

## 2023-10-26 RX ADMIN — KETOROLAC TROMETHAMINE 30 MG: 30 INJECTION, SOLUTION INTRAMUSCULAR; INTRAVENOUS at 14:56

## 2023-10-26 ASSESSMENT — PAIN SCALES - GENERAL
PAINLEVEL_OUTOF10: 4
PAINLEVEL_OUTOF10: 8
PAINLEVEL_OUTOF10: 10
PAINLEVEL_OUTOF10: 4
PAINLEVEL_OUTOF10: 5
PAINLEVEL_OUTOF10: 8
PAINLEVEL_OUTOF10: 8
PAINLEVEL_OUTOF10: 4
PAINLEVEL_OUTOF10: 10
PAINLEVEL_OUTOF10: 4
PAINLEVEL_OUTOF10: 10
PAINLEVEL_OUTOF10: 8
PAINLEVEL_OUTOF10: 10

## 2023-10-26 ASSESSMENT — PAIN DESCRIPTION - PAIN TYPE
TYPE: SURGICAL PAIN
TYPE: ACUTE PAIN;SURGICAL PAIN
TYPE: SURGICAL PAIN

## 2023-10-26 ASSESSMENT — PAIN DESCRIPTION - LOCATION
LOCATION: ABDOMEN
LOCATION: ABDOMEN;INCISION
LOCATION: ABDOMEN
LOCATION: ABDOMEN;INCISION
LOCATION: ABDOMEN

## 2023-10-26 ASSESSMENT — PAIN DESCRIPTION - DESCRIPTORS
DESCRIPTORS: SHARP;SHOOTING
DESCRIPTORS: ACHING
DESCRIPTORS: SHARP
DESCRIPTORS: SHARP;ACHING
DESCRIPTORS: ACHING;SHARP
DESCRIPTORS: ACHING
DESCRIPTORS: SHARP

## 2023-10-26 ASSESSMENT — PAIN DESCRIPTION - ONSET
ONSET: GRADUAL
ONSET: ON-GOING

## 2023-10-26 ASSESSMENT — PAIN DESCRIPTION - FREQUENCY
FREQUENCY: CONTINUOUS

## 2023-10-26 ASSESSMENT — PAIN DESCRIPTION - ORIENTATION
ORIENTATION: ANTERIOR;INNER
ORIENTATION: RIGHT
ORIENTATION: MID
ORIENTATION: MID
ORIENTATION: RIGHT;ANTERIOR;LEFT;MID

## 2023-10-26 NOTE — PROGRESS NOTES
Pt was received from PACU post Exploratory Lap , VS checked and recorded, Skin check was done with Deepika Self RN, her skin is clean and intact,She  is connected to IVF, SCD and NGT to suction, Pt was given ice pack to her abdomen for comfort, Ativan 1 mg IV given to calm her . Pt is resting comfortable in bed. Will monitor.

## 2023-10-26 NOTE — ANESTHESIA POSTPROCEDURE EVALUATION
Department of Anesthesiology  Postprocedure Note    Patient: Jaron Ku  MRN: [id number]  YOB: 1960  Date of evaluation: 10/26/2023      Procedure Summary     Date: 10/25/23 Room / Location: [facility] MA[facility] OR M4 / [facility] MA[facility] OR    Anesthesia Start: 2105 Anesthesia Stop: 2317    Procedure: LAPAROTOMY EXPLORATORY WITH LYSIS OF ADHESIONS (Abdomen) Diagnosis:       Small bowel obstruction ([address])      (Small bowel obstruction ([address]) [K56.609])    Providers: Rashad Fenton MD Responsible Provider: Gagandeep Mccain MD    Anesthesia Type: General ASA Status: 3          Anesthesia Type: General    Phyllis Phase I: Phyllis Score: 9    Phyllis Phase II:        Anesthesia Post Evaluation    Patient location during evaluation: PACU  Patient participation: complete - patient participated  Level of consciousness: sleepy but conscious and responsive to verbal stimuli  Airway patency: patent  Nausea & Vomiting: no vomiting and no nausea  Complications: no  Cardiovascular status: blood pressure returned to baseline and hemodynamically stable  Respiratory status: acceptable  Hydration status: stable

## 2023-10-26 NOTE — OP NOTE
[facility]  OPERATIVE REPORT    Name:  Alana Goodman  MR#:  [id number]  :  1960  ACCOUNT #:  [de-identified]  DATE OF SERVICE:  10/25/2023    PREOPERATIVE DIAGNOSIS:  Small bowel obstruction. POSTOPERATIVE DIAGNOSIS:  Small bowel obstruction. PROCEDURE PERFORMED:  Adhesiolysis. SURGEON:  Paty Gray MD    ASSISTANT:  Samuel Tse    ANESTHESIA:  General endotracheal.    COMPLICATIONS:  None apparent. SPECIMENS REMOVED:  None. IMPLANTS:  1. Seprafilm lot number [id number].  2.  Interceed ST lot number [id number]. ESTIMATED BLOOD LOSS:  Minimal.    DRAINS:  16-Kazakh Phan catheter and an 18-Kazakh nasogastric tube. FINDINGS:  Multiple adhesions between the loops of small bowel and the sigmoid colon causing a distal small bowel obstruction. INDICATIONS FOR PROCEDURE:  The patient is a 68-year-old female with past history of an open hysterectomy. The patient presented 48 hours ago with abdominal pain, nausea and vomiting. Abdomen and pelvis CT demonstrated small-bowel obstruction. The patient has failed to progress over 48 hours. Films today show persistent small bowel obstruction with minimal progress of oral contrast into the small bowel. So the patient is taken to the operating room for exploration. DESCRIPTION OF PROCEDURE:  The patient was identified in the preoperative holding area, informed consent was obtained. She was given preoperative antibiotics. She was then taken to the operating room, placed in a supine position, underwent general tracheal anesthesia without complication. A Phan catheter was inserted under sterile technique. Her abdomen was then prepped and draped in the usual sterile fashion. A time-out was performed to confirm that the patient was identified by name and that she was presenting for exploratory laparotomy with lysis of adhesions.   Once this was confirmed, a midline incision was made starting 6 cm above the umbilicus and were , the small-bowel was eviscerated further out through the incision and the contents were milked back to the nasogastric tube. The bowel was followed serially to the ileocecal valve. There was a clear transition between dilated fluid-filled loops of bowel and completely decompressed bowel in the deep pelvis. Once adhesions were lysed, this difference in caliber quickly resolved. The bowel was then run from the ileocecal valve back to the ligament of Treitz. The serosa was thoroughly examined to ensure no serosal injuries. A total of 1800 mL of enteric contents was milked back from the bowel into the stomach and out through the nasogastric tube. This completely decompressed the bowel. Given that the patient had complaints of chronic constipation and a very tortuous sigmoid colon that made colonoscopy very difficult. It was decided that once all the adhesions were lysed, the sigmoid colon was examined. With the small bowel adhesions to the colon freed, the sigmoid colon did not appear tortuous and it was palpated. There were no palpable masses or any abnormalities to prevent adhesions from falling on the sigmoid colon again. A piece of Interceed was placed over the sigmoid colon starting from the peritoneal reflection and extending up towards the pelvic inlet completely covering the sigmoid colon. The small bowel was then returned to the abdomen. The omentum was brought down to cover all of the exposed small bowel in the midline. A six pack of Seprafilm was then used to create a barrier between the omentum and the midline incision. The midline incision was then closed with a running Stratafix symmetric suture. The subcutaneous tissue was irrigated with Irrisept irrigation solution. Interrupted deep sutures were used to loosely approximate the midline incision. The midline incision was then closed with a running subcuticular suture. Dermabond was placed over the skin surface.     The

## 2023-10-26 NOTE — PROGRESS NOTES
Ms. Chester Cook is alert and oriented. She complaints of abdominal pain and has been receiving dilaudid 1mg q3h which is helping when given on time. Patient was encouraged to walk in room or sit in chair for a while, but stated that she was in too much pain to move. She used the commode twice with very minimal urine output. She received a 1L bolus per provider's orders. This evening, patient ambulated in room for 5mins and is now sitting up in the chair.  Will continue to monitor

## 2023-10-26 NOTE — BRIEF OP NOTE
Brief Postoperative Note      Patient: Sydnee Bianchi  YOB: 1960  MRN: [id number]    Date of Procedure: 10/25/2023    Pre-Op Diagnosis Codes:     * Small bowel obstruction ([address]) [L66.974]    Post-Op Diagnosis: Same       Procedure(s):  LAPAROTOMY EXPLORATORY, LYSIS OF ADHESIONS    Surgeon(s):  Emilee Farnsworth MD    Assistant:  * No surgical staff found *    Anesthesia: General    Estimated Blood Loss (mL): Minimal    Complications: None    Specimens:   * No specimens in log *    Implants:  Implant Name Type Inv. Item Serial No.  Lot No. LRB No. Used Action   BARRIER ADH SHT 6X5 IN SODIUM HYALURONATE [employer]MC SEPRAFILM - [id number]  BARRIER ADH SHT 6X5 IN SODIUM HYALURONATE CMC SEPRAFILM  [employer]- [id number] N/A 1 Implanted   BARRIER ADH ABSRB GYNECARE INTCEED ST [id number] - [id number]  BARRIER ADH ABSRB GYNECARE INTCEED ST [id number]  [facility]- [id number] N/A 1 Implanted         Drains:   NG/OG/NJ/NE Tube Right nostril (Active)   Surrounding Skin Clean, dry & intact 10/25/23 1536   Securement device Adhesive based bay 10/25/23 1536   Status Suction-low continuous 10/25/23 1536   Drainage Appearance Brown 10/25/23 1536   Output (mL) 1200 ml 10/25/23 1056       Urinary Catheter 10/25/23 2 Way (Active)       Findings: 1) multiple adhesions between loops of small bowel and the sigmoid colon causing a distal SBO.        Electronically signed by Emilee Farnsworth MD on 10/25/2023 at 10:32 PM

## 2023-10-26 NOTE — PROGRESS NOTES
Attempted to call patient's  on his cell phone ([phone]) twice and in the patient's room. Could not get through to him.

## 2023-10-27 LAB
ANION GAP SERPL CALC-SCNC: 4 MMOL/L (ref 5–15)
BASOPHILS # BLD: 0 K/UL (ref 0–0.1)
BASOPHILS NFR BLD: 0 % (ref 0–1)
BUN SERPL-MCNC: 5 MG/DL (ref 6–20)
BUN/CREAT SERPL: 10 (ref 12–20)
CALCIUM SERPL-MCNC: 8.1 MG/DL (ref 8.5–10.1)
CHLORIDE SERPL-SCNC: 106 MMOL/L (ref 97–108)
CO2 SERPL-SCNC: 27 MMOL/L (ref 21–32)
CREAT SERPL-MCNC: 0.52 MG/DL (ref 0.55–1.02)
DIFFERENTIAL METHOD BLD: ABNORMAL
EOSINOPHIL # BLD: 0.1 K/UL (ref 0–0.4)
EOSINOPHIL NFR BLD: 2 % (ref 0–7)
ERYTHROCYTE [DISTWIDTH] IN BLOOD BY AUTOMATED COUNT: 11.8 % (ref 11.5–14.5)
GLUCOSE SERPL-MCNC: 108 MG/DL (ref 65–100)
HCT VFR BLD AUTO: 36.1 % (ref 35–47)
HGB BLD-MCNC: 11.7 G/DL (ref 11.5–16)
IMM GRANULOCYTES # BLD AUTO: 0 K/UL (ref 0–0.04)
IMM GRANULOCYTES NFR BLD AUTO: 0 % (ref 0–0.5)
LYMPHOCYTES # BLD: 0.8 K/UL (ref 0.8–3.5)
LYMPHOCYTES NFR BLD: 14 % (ref 12–49)
MCH RBC QN AUTO: 31.3 PG (ref 26–34)
MCHC RBC AUTO-ENTMCNC: 32.4 G/DL (ref 30–36.5)
MCV RBC AUTO: 96.5 FL (ref 80–99)
MONOCYTES # BLD: 0.6 K/UL (ref 0–1)
MONOCYTES NFR BLD: 12 % (ref 5–13)
NEUTS SEG # BLD: 3.9 K/UL (ref 1.8–8)
NEUTS SEG NFR BLD: 72 % (ref 32–75)
NRBC # BLD: 0 K/UL (ref 0–0.01)
NRBC BLD-RTO: 0 PER 100 WBC
PLATELET # BLD AUTO: 313 K/UL (ref 150–400)
PMV BLD AUTO: 9 FL (ref 8.9–12.9)
POTASSIUM SERPL-SCNC: 3.8 MMOL/L (ref 3.5–5.1)
RBC # BLD AUTO: 3.74 M/UL (ref 3.8–5.2)
SODIUM SERPL-SCNC: 137 MMOL/L (ref 136–145)
WBC # BLD AUTO: 5.4 K/UL (ref 3.6–11)

## 2023-10-27 PROCEDURE — 6360000002 HC RX W HCPCS: Performed by: SURGERY

## 2023-10-27 PROCEDURE — 2500000003 HC RX 250 WO HCPCS: Performed by: NURSE PRACTITIONER

## 2023-10-27 PROCEDURE — 99024 POSTOP FOLLOW-UP VISIT: CPT | Performed by: NURSE PRACTITIONER

## 2023-10-27 PROCEDURE — 1100000000 HC RM PRIVATE

## 2023-10-27 PROCEDURE — 80048 BASIC METABOLIC PNL TOTAL CA: CPT

## 2023-10-27 PROCEDURE — 2580000003 HC RX 258: Performed by: SURGERY

## 2023-10-27 PROCEDURE — 36415 COLL VENOUS BLD VENIPUNCTURE: CPT

## 2023-10-27 PROCEDURE — 2580000003 HC RX 258: Performed by: NURSE PRACTITIONER

## 2023-10-27 PROCEDURE — 85025 COMPLETE CBC W/AUTO DIFF WBC: CPT

## 2023-10-27 RX ORDER — HYDROMORPHONE HYDROCHLORIDE 1 MG/ML
0.5 INJECTION, SOLUTION INTRAMUSCULAR; INTRAVENOUS; SUBCUTANEOUS EVERY 4 HOURS PRN
Status: DISCONTINUED | OUTPATIENT
Start: 2023-10-27 | End: 2023-10-28

## 2023-10-27 RX ORDER — HYDROMORPHONE HYDROCHLORIDE 1 MG/ML
1 INJECTION, SOLUTION INTRAMUSCULAR; INTRAVENOUS; SUBCUTANEOUS EVERY 4 HOURS PRN
Status: DISCONTINUED | OUTPATIENT
Start: 2023-10-27 | End: 2023-10-31

## 2023-10-27 RX ORDER — OXYCODONE HYDROCHLORIDE 5 MG/1
5 TABLET ORAL EVERY 4 HOURS PRN
Status: DISCONTINUED | OUTPATIENT
Start: 2023-10-27 | End: 2023-10-31

## 2023-10-27 RX ORDER — SODIUM CHLORIDE 9 MG/ML
INJECTION, SOLUTION INTRAVENOUS CONTINUOUS
Status: ACTIVE | OUTPATIENT
Start: 2023-10-27 | End: 2023-10-28

## 2023-10-27 RX ADMIN — KETOROLAC TROMETHAMINE 30 MG: 30 INJECTION, SOLUTION INTRAMUSCULAR; INTRAVENOUS at 14:28

## 2023-10-27 RX ADMIN — HYDROMORPHONE HYDROCHLORIDE 1 MG: 1 INJECTION, SOLUTION INTRAMUSCULAR; INTRAVENOUS; SUBCUTANEOUS at 00:23

## 2023-10-27 RX ADMIN — SODIUM CHLORIDE: 9 INJECTION, SOLUTION INTRAVENOUS at 21:34

## 2023-10-27 RX ADMIN — HYDROMORPHONE HYDROCHLORIDE 1 MG: 1 INJECTION, SOLUTION INTRAMUSCULAR; INTRAVENOUS; SUBCUTANEOUS at 23:09

## 2023-10-27 RX ADMIN — HYDROMORPHONE HYDROCHLORIDE 1 MG: 1 INJECTION, SOLUTION INTRAMUSCULAR; INTRAVENOUS; SUBCUTANEOUS at 14:27

## 2023-10-27 RX ADMIN — ENOXAPARIN SODIUM 40 MG: 100 INJECTION SUBCUTANEOUS at 09:20

## 2023-10-27 RX ADMIN — LORAZEPAM 1 MG: 2 INJECTION INTRAMUSCULAR; INTRAVENOUS at 21:32

## 2023-10-27 RX ADMIN — HYDROMORPHONE HYDROCHLORIDE 1 MG: 1 INJECTION, SOLUTION INTRAMUSCULAR; INTRAVENOUS; SUBCUTANEOUS at 03:20

## 2023-10-27 RX ADMIN — HYDROMORPHONE HYDROCHLORIDE 1 MG: 1 INJECTION, SOLUTION INTRAMUSCULAR; INTRAVENOUS; SUBCUTANEOUS at 07:12

## 2023-10-27 RX ADMIN — SODIUM CHLORIDE, PRESERVATIVE FREE 10 ML: 5 INJECTION INTRAVENOUS at 21:32

## 2023-10-27 RX ADMIN — KETOROLAC TROMETHAMINE 30 MG: 30 INJECTION, SOLUTION INTRAMUSCULAR; INTRAVENOUS at 03:20

## 2023-10-27 RX ADMIN — DEXTROSE AND SODIUM CHLORIDE: 5; 450 INJECTION, SOLUTION INTRAVENOUS at 02:15

## 2023-10-27 RX ADMIN — SODIUM CHLORIDE: 9 INJECTION, SOLUTION INTRAVENOUS at 09:20

## 2023-10-27 RX ADMIN — KETOROLAC TROMETHAMINE 30 MG: 30 INJECTION, SOLUTION INTRAMUSCULAR; INTRAVENOUS at 09:19

## 2023-10-27 RX ADMIN — HYDROMORPHONE HYDROCHLORIDE 1 MG: 1 INJECTION, SOLUTION INTRAMUSCULAR; INTRAVENOUS; SUBCUTANEOUS at 10:19

## 2023-10-27 RX ADMIN — LORAZEPAM 1 MG: 2 INJECTION INTRAMUSCULAR; INTRAVENOUS at 09:18

## 2023-10-27 RX ADMIN — LORAZEPAM 1 MG: 2 INJECTION INTRAMUSCULAR; INTRAVENOUS at 15:23

## 2023-10-27 RX ADMIN — HYDROMORPHONE HYDROCHLORIDE 1 MG: 1 INJECTION, SOLUTION INTRAMUSCULAR; INTRAVENOUS; SUBCUTANEOUS at 18:35

## 2023-10-27 RX ADMIN — SODIUM CHLORIDE, PRESERVATIVE FREE 10 ML: 5 INJECTION INTRAVENOUS at 07:08

## 2023-10-27 ASSESSMENT — PAIN DESCRIPTION - ONSET
ONSET: GRADUAL

## 2023-10-27 ASSESSMENT — PAIN SCALES - GENERAL
PAINLEVEL_OUTOF10: 8
PAINLEVEL_OUTOF10: 5
PAINLEVEL_OUTOF10: 5
PAINLEVEL_OUTOF10: 10
PAINLEVEL_OUTOF10: 8
PAINLEVEL_OUTOF10: 4
PAINLEVEL_OUTOF10: 9
PAINLEVEL_OUTOF10: 10
PAINLEVEL_OUTOF10: 9
PAINLEVEL_OUTOF10: 9
PAINLEVEL_OUTOF10: 4
PAINLEVEL_OUTOF10: 4
PAINLEVEL_OUTOF10: 7
PAINLEVEL_OUTOF10: 7

## 2023-10-27 ASSESSMENT — PAIN DESCRIPTION - FREQUENCY
FREQUENCY: CONTINUOUS

## 2023-10-27 ASSESSMENT — PAIN DESCRIPTION - LOCATION
LOCATION: ABDOMEN

## 2023-10-27 ASSESSMENT — PAIN DESCRIPTION - ORIENTATION
ORIENTATION: ANTERIOR
ORIENTATION: MID;ANTERIOR
ORIENTATION: ANTERIOR

## 2023-10-27 ASSESSMENT — PAIN DESCRIPTION - DESCRIPTORS
DESCRIPTORS: ACHING;CRAMPING
DESCRIPTORS: ACHING
DESCRIPTORS: ACHING;SHARP
DESCRIPTORS: ACHING;CRAMPING
DESCRIPTORS: ACHING
DESCRIPTORS: ACHING;SHARP

## 2023-10-27 ASSESSMENT — PAIN - FUNCTIONAL ASSESSMENT
PAIN_FUNCTIONAL_ASSESSMENT: PREVENTS OR INTERFERES SOME ACTIVE ACTIVITIES AND ADLS

## 2023-10-27 ASSESSMENT — PAIN DESCRIPTION - PAIN TYPE
TYPE: SURGICAL PAIN

## 2023-10-27 NOTE — CARE COORDINATION
Transition of Care Plan:    RUR: 7%  Prior Level of Functioning:   Disposition: Home w/family  If SNF or IPR: Date FOC offered:   Date FOC received:   Accepting facility:   Date authorization started with reference number:   Date authorization received and expires: Follow up appointments: on AVS  DME needed: n/a  Transportation at discharge:   IM/IMM Medicare/ letter given: n/a  Is patient a [place] and connected with [facility]? If yes, was [employer] transfer form completed and VA notified? Caregiver Contact:   Discharge Caregiver contacted prior to discharge? Care Conference needed? Barriers to discharge:     PCP office stated that pt will only see Dr Ilya Degroot and his first available is Jan 2nd. Appointment was made for that day & a message was sent to MD requesting a sooner appointment. CM will continue to follow.     Tommy Jan  Ext [phone]

## 2023-10-27 NOTE — PROGRESS NOTES
Patient alert and oreitned x 4. Patient a standby assist to the bedside commode. Patient urine output completed and documented. Pain management over night. Patient continues to complain 10/10 abdominal pain with using the PRNs often throughout the night. Midline incision maximino dressing has scant old drainage present. NG tube bridled at 59 cm. Patent denies nausea. Patient passing gas overnight.  Bed placed in lowest position with call light in reach

## 2023-10-27 NOTE — PROGRESS NOTES
Spiritual Care Assessment/Progress Note  [facility]    Name: Geni Santa MRN: [id number]    Age: 58 y.o. Sex: female   Language: English     Date: 10/27/2023            Total Time Calculated: 10 min              Spiritual Assessment begun in [facility] 3 SURG TELE  Service Provided For[de-identified] Patient and family together ( in room)  Referral/Consult From[de-identified] Rounding  Encounter Overview/Reason : Initial Encounter    Spiritual beliefs:      [] Involved in a jadyn tradition/spiritual practice:      [] Supported by a jadyn community:      [] Claims no spiritual orientation:      [] Seeking spiritual identity:           [] Adheres to an individual form of spirituality:      [x] Not able to assess:                Identified resources for coping and support system:   Support System: Spouse       [] Prayer                  [] Devotional reading               [] Music                  [] Guided Imagery     [] Pet visits                                        [] Other: (COMMENT)     Specific area/focus of visit   Encounter:    Crisis:    Spiritual/Emotional needs: Type: Spiritual Support  Ritual, Rites and Sacraments:    Grief, Loss, and Adjustments:    Ethics/Mediation:    Behavioral Health:    Palliative Care: Advance Care Planning:           Narrative:   Stepped into room and met  in room. Patient shared that she was not interested in a visit right now as she is in a lot of pain. I shared that chaplains are available during the daytime if she should want a visit another day. She could ask for a visit if/when she feels better. I left the room and someone else from the hospital came in to speak with the patient. Tish Knight paging service [phone]

## 2023-10-28 PROCEDURE — 2580000003 HC RX 258: Performed by: SURGERY

## 2023-10-28 PROCEDURE — 2580000003 HC RX 258: Performed by: NURSE PRACTITIONER

## 2023-10-28 PROCEDURE — 2500000003 HC RX 250 WO HCPCS: Performed by: SURGERY

## 2023-10-28 PROCEDURE — 1100000000 HC RM PRIVATE

## 2023-10-28 PROCEDURE — 6360000002 HC RX W HCPCS: Performed by: SURGERY

## 2023-10-28 PROCEDURE — 2500000003 HC RX 250 WO HCPCS: Performed by: NURSE PRACTITIONER

## 2023-10-28 RX ORDER — HYDROMORPHONE HYDROCHLORIDE 1 MG/ML
0.5 INJECTION, SOLUTION INTRAMUSCULAR; INTRAVENOUS; SUBCUTANEOUS EVERY 4 HOURS PRN
Status: DISCONTINUED | OUTPATIENT
Start: 2023-10-28 | End: 2023-10-31

## 2023-10-28 RX ORDER — ENOXAPARIN SODIUM 100 MG/ML
30 INJECTION SUBCUTANEOUS DAILY
Status: DISCONTINUED | OUTPATIENT
Start: 2023-10-29 | End: 2023-11-02 | Stop reason: HOSPADM

## 2023-10-28 RX ADMIN — LORAZEPAM 1 MG: 2 INJECTION INTRAMUSCULAR; INTRAVENOUS at 09:35

## 2023-10-28 RX ADMIN — ENOXAPARIN SODIUM 40 MG: 100 INJECTION SUBCUTANEOUS at 09:28

## 2023-10-28 RX ADMIN — SODIUM CHLORIDE, PRESERVATIVE FREE 10 ML: 5 INJECTION INTRAVENOUS at 09:26

## 2023-10-28 RX ADMIN — SODIUM CHLORIDE: 9 INJECTION, SOLUTION INTRAVENOUS at 05:36

## 2023-10-28 RX ADMIN — HYDROMORPHONE HYDROCHLORIDE 1 MG: 1 INJECTION, SOLUTION INTRAMUSCULAR; INTRAVENOUS; SUBCUTANEOUS at 03:45

## 2023-10-28 RX ADMIN — HYDROMORPHONE HYDROCHLORIDE 1 MG: 1 INJECTION, SOLUTION INTRAMUSCULAR; INTRAVENOUS; SUBCUTANEOUS at 07:54

## 2023-10-28 RX ADMIN — LORAZEPAM 1 MG: 2 INJECTION INTRAMUSCULAR; INTRAVENOUS at 18:03

## 2023-10-28 RX ADMIN — SODIUM CHLORIDE: 9 INJECTION, SOLUTION INTRAVENOUS at 12:48

## 2023-10-28 RX ADMIN — HYDROMORPHONE HYDROCHLORIDE 1 MG: 1 INJECTION, SOLUTION INTRAMUSCULAR; INTRAVENOUS; SUBCUTANEOUS at 16:54

## 2023-10-28 RX ADMIN — HYDROMORPHONE HYDROCHLORIDE 1 MG: 1 INJECTION, SOLUTION INTRAMUSCULAR; INTRAVENOUS; SUBCUTANEOUS at 12:42

## 2023-10-28 RX ADMIN — SODIUM CHLORIDE, PRESERVATIVE FREE 10 ML: 5 INJECTION INTRAVENOUS at 21:44

## 2023-10-28 RX ADMIN — HYDROMORPHONE HYDROCHLORIDE 1 MG: 1 INJECTION, SOLUTION INTRAMUSCULAR; INTRAVENOUS; SUBCUTANEOUS at 21:43

## 2023-10-28 RX ADMIN — MAGNESIUM SULFATE HEPTAHYDRATE: 500 INJECTION, SOLUTION INTRAMUSCULAR; INTRAVENOUS at 18:06

## 2023-10-28 ASSESSMENT — PAIN DESCRIPTION - ORIENTATION
ORIENTATION: RIGHT;LEFT
ORIENTATION: LEFT
ORIENTATION: LOWER
ORIENTATION: LEFT
ORIENTATION: LOWER

## 2023-10-28 ASSESSMENT — PAIN SCALES - GENERAL
PAINLEVEL_OUTOF10: 8
PAINLEVEL_OUTOF10: 10
PAINLEVEL_OUTOF10: 7

## 2023-10-28 ASSESSMENT — PAIN DESCRIPTION - PAIN TYPE: TYPE: ACUTE PAIN

## 2023-10-28 ASSESSMENT — PAIN DESCRIPTION - LOCATION
LOCATION: ABDOMEN

## 2023-10-28 ASSESSMENT — PAIN DESCRIPTION - DESCRIPTORS
DESCRIPTORS: ACHING;SHARP
DESCRIPTORS: ACHING;SHARP

## 2023-10-28 NOTE — PROGRESS NOTES
Pharmacy TPN Management Note    TPN indication: prolonged NPO status  3 days post op s/p laparotomy exploratory with lysis of adhesions    TPN type: Peripheral     Macronutrients:  Protein: 50g  Dextrose: 150g  Lipids: ---    Rate: 70.8 ml/hr    Labs:  Recent Labs     Units 10/27/23  0931 10/26/23  0432   NA mmol/L 137 131*   K mmol/L 3.8 5.0   CL mmol/L 106 103   CO2 mmol/L 27 24   MG mg/dL  --  1.7   PHOS MG/DL  --  3.4   BUN MG/DL 5* 7     No results for input(s): \"AST\", \"ALT\" in the last 72 hours. Invalid input(s): \"BILIRUBIN\", \"AP\"  Recent Labs     Units 10/27/23  0931   WBC K/uL 5.4   HGB g/dL 11.7   HCT % 36.1       Electrolytes (dosed per LITER):  Na: 35 meq/L; K: 30 meq/L; Phos:15 mmol/L; M meq/L; Ca 4.5 meq/L    Other additives in TPN: multivitamins  and trace elements    Impression/Plan:   NS infusion to be stopped prior to TPN start   Ordered BMP, Mag, Phos daily   Calculated osmolarity of this peripheral TPN = 884.31  TPN macronutrient/rate changes: new start  TPN electrolyte changes: new start  TPN insulin changes: n/a     Pharmacy will continue to monitor enteral nutrition plan, electrolytes, renal function, and dietician recommendations and adjust parenteral nutrition as needed.     Thank you,  Koffi Caal, [facility]

## 2023-10-28 NOTE — PROGRESS NOTES
Pt is up in crow walking with . Dilaudid given at 0800 and Ativan given at 0930. RLQ abd bowel sounds. No flatus yet.

## 2023-10-28 NOTE — PROGRESS NOTES
Comprehensive Nutrition Assessment    Type and Reason for Visit:  Initial, NPO/Clear Liquid    Nutrition Recommendations/Plan:   NPO. Diet advancement pending return of bowel function  Okay per Dr. Francia Strickland to start PPN today (NPO status x6-7 days)  Recommend 50gAA, 150gD in 1.7L for allow for lower osmolarity in peripheral line. This will meet 100% of protein needs and 710 kcals (60%). Malnutrition Assessment:  Malnutrition Status: At risk for malnutrition (Comment) (10/28/23 1212)    Context:  Acute Illness     Findings of the 6 clinical characteristics of malnutrition:  Energy Intake:  50% or less of estimated energy requirements for 5 or more days  Weight Loss:  Greater than 7.5% over 3 months     Body Fat Loss:  No significant body fat loss     Muscle Mass Loss:  No significant muscle mass loss    Fluid Accumulation:  No significant fluid accumulation     Strength:  Not Performed    Nutrition Assessment:     Chart reviewed for NPO status x 6-7 days. Pt medically noted for SBO, s/p ex-lap with lysis of adhesions. Pt reports a hx of SBO in 2015, but had resolution after about 3 days. Discussed with attending, Dr Francia Strickland, nabil to start PPN today. Pt reports no issues with appetite/PO intake prior to this episode. EMR shows significant weight loss over the last few months, but pt reports usual weight is around 95-97#. RN has documented some bowel sounds, pt has been up walking, but no sign of flatus or stool/bowel function yet. Will continue monitoring. Wt Readings from Last 5 Encounters:   10/23/23 43.5 kg (96 lb)   06/08/23 49.4 kg (109 lb)   ]    Nutrition Related Findings:    Labs: reviewed. Meds: NS, dilaudid. BM 10/22.    Wound Type: Surgical Incision (abdomen)       Current Nutrition Intake & Therapies:    Average Meal Intake: NPO  Average Supplements Intake: NPO  Diet NPO    Anthropometric Measures:  Height: 152.4 cm (5')  Ideal Body Weight (IBW): 100 lbs (45 kg)       Current Body Weight: 43.5 kg (96 lb), 96 % IBW. Weight Source: Not Specified  Current BMI (kg/m2): 18.7        Weight Adjustment For: No Adjustment                 BMI Categories: Normal Weight (BMI 18.5-24. 9)    Estimated Daily Nutrient Needs:  Energy Requirements Based On: Formula  Weight Used for Energy Requirements: Current  Energy (kcal/day): 1180 kcals (BMR x 1. 3AF)  Weight Used for Protein Requirements: Current  Protein (g/day): 44-52g (1.0-1.2g/kg)  Method Used for Fluid Requirements: 1 ml/kcal  Fluid (ml/day): 1180mL    Nutrition Diagnosis:   Inadequate protein-energy intake related to altered GI function as evidenced by NPO or clear liquid status due to medical condition    Nutrition Interventions:   Food and/or Nutrient Delivery: Start Parenteral Nutrition  Nutrition Education/Counseling: No recommendation at this time  Coordination of Nutrition Care: Continue to monitor while inpatient       Goals:     Goals: Initiate nutrition support, by next RD assessment       Nutrition Monitoring and Evaluation:   Behavioral-Environmental Outcomes: None Identified  Food/Nutrient Intake Outcomes: Diet Advancement/Tolerance, Parenteral Nutrition Intake/Tolerance  Physical Signs/Symptoms Outcomes: Biochemical Data, GI Status, Nutrition Focused Physical Findings, Skin, Weight    Discharge Planning:     Too soon to determine     Neha Adam RD  Contact:

## 2023-10-28 NOTE — PLAN OF CARE
Pt is resting in bed now. She went on another walk in the crow after 3pm. Denies flatus. Afebrile. Drsg without new drng. PPN running at 70.8ml/hr. Call bell in reach. Problem: Discharge Planning  Goal: Discharge to home or other facility with appropriate resources  Outcome: Progressing     Problem: Safety - Adult  Goal: Free from fall injury  Outcome: Progressing  Flowsheets (Taken 10/28/2023 0750)  Free From Fall Injury: Instruct family/caregiver on patient safety     Problem: Pain  Goal: Verbalizes/displays adequate comfort level or baseline comfort level  Outcome: Progressing     Problem: Chronic Conditions and Co-morbidities  Goal: Patient's chronic conditions and co-morbidity symptoms are monitored and maintained or improved  Outcome: Progressing     Problem: Skin/Tissue Integrity  Goal: Absence of new skin breakdown  Description: 1. Monitor for areas of redness and/or skin breakdown  2. Assess vascular access sites hourly  3. Every 4-6 hours minimum:  Change oxygen saturation probe site  4. Every 4-6 hours:  If on nasal continuous positive airway pressure, respiratory therapy assess nares and determine need for appliance change or resting period.   Outcome: Progressing

## 2023-10-28 NOTE — PROGRESS NOTES
P&T-Approved DVT Prophylaxis Dosing    Per P&T Committee-approved protocol enoxaparin 40 mg daily has been adjusted to enoxaparin 30 mg daily based on weight and renal function as shown in the table below.          Beatrice Drew, [facility]

## 2023-10-29 LAB
ANION GAP SERPL CALC-SCNC: 5 MMOL/L (ref 5–15)
BUN SERPL-MCNC: 7 MG/DL (ref 6–20)
BUN/CREAT SERPL: 16 (ref 12–20)
CALCIUM SERPL-MCNC: 8.8 MG/DL (ref 8.5–10.1)
CHLORIDE SERPL-SCNC: 105 MMOL/L (ref 97–108)
CO2 SERPL-SCNC: 28 MMOL/L (ref 21–32)
CREAT SERPL-MCNC: 0.45 MG/DL (ref 0.55–1.02)
GLUCOSE SERPL-MCNC: 127 MG/DL (ref 65–100)
MAGNESIUM SERPL-MCNC: 2.1 MG/DL (ref 1.6–2.4)
PHOSPHATE SERPL-MCNC: 4.8 MG/DL (ref 2.6–4.7)
POTASSIUM SERPL-SCNC: 3.4 MMOL/L (ref 3.5–5.1)
SODIUM SERPL-SCNC: 138 MMOL/L (ref 136–145)

## 2023-10-29 PROCEDURE — 80048 BASIC METABOLIC PNL TOTAL CA: CPT

## 2023-10-29 PROCEDURE — 36415 COLL VENOUS BLD VENIPUNCTURE: CPT

## 2023-10-29 PROCEDURE — 6360000002 HC RX W HCPCS: Performed by: STUDENT IN AN ORGANIZED HEALTH CARE EDUCATION/TRAINING PROGRAM

## 2023-10-29 PROCEDURE — 6360000002 HC RX W HCPCS: Performed by: SURGERY

## 2023-10-29 PROCEDURE — 2580000003 HC RX 258: Performed by: SURGERY

## 2023-10-29 PROCEDURE — 2500000003 HC RX 250 WO HCPCS: Performed by: NURSE PRACTITIONER

## 2023-10-29 PROCEDURE — 83735 ASSAY OF MAGNESIUM: CPT

## 2023-10-29 PROCEDURE — 1100000000 HC RM PRIVATE

## 2023-10-29 PROCEDURE — 84100 ASSAY OF PHOSPHORUS: CPT

## 2023-10-29 PROCEDURE — 2500000003 HC RX 250 WO HCPCS: Performed by: SURGERY

## 2023-10-29 RX ORDER — HYDRALAZINE HYDROCHLORIDE 20 MG/ML
10 INJECTION INTRAMUSCULAR; INTRAVENOUS EVERY 6 HOURS PRN
Status: DISCONTINUED | OUTPATIENT
Start: 2023-10-29 | End: 2023-11-02 | Stop reason: HOSPADM

## 2023-10-29 RX ADMIN — HYDRALAZINE HYDROCHLORIDE 10 MG: 20 INJECTION, SOLUTION INTRAMUSCULAR; INTRAVENOUS at 12:29

## 2023-10-29 RX ADMIN — LORAZEPAM 1 MG: 2 INJECTION INTRAMUSCULAR; INTRAVENOUS at 15:02

## 2023-10-29 RX ADMIN — HYDROMORPHONE HYDROCHLORIDE 1 MG: 1 INJECTION, SOLUTION INTRAMUSCULAR; INTRAVENOUS; SUBCUTANEOUS at 09:52

## 2023-10-29 RX ADMIN — HYDROMORPHONE HYDROCHLORIDE 1 MG: 1 INJECTION, SOLUTION INTRAMUSCULAR; INTRAVENOUS; SUBCUTANEOUS at 18:15

## 2023-10-29 RX ADMIN — SODIUM CHLORIDE, PRESERVATIVE FREE 10 ML: 5 INJECTION INTRAVENOUS at 21:49

## 2023-10-29 RX ADMIN — LORAZEPAM 1 MG: 2 INJECTION INTRAMUSCULAR; INTRAVENOUS at 21:48

## 2023-10-29 RX ADMIN — HYDROMORPHONE HYDROCHLORIDE 1 MG: 1 INJECTION, SOLUTION INTRAMUSCULAR; INTRAVENOUS; SUBCUTANEOUS at 14:02

## 2023-10-29 RX ADMIN — HYDROMORPHONE HYDROCHLORIDE 1 MG: 1 INJECTION, SOLUTION INTRAMUSCULAR; INTRAVENOUS; SUBCUTANEOUS at 04:20

## 2023-10-29 RX ADMIN — LORAZEPAM 1 MG: 2 INJECTION INTRAMUSCULAR; INTRAVENOUS at 08:55

## 2023-10-29 RX ADMIN — MAGNESIUM SULFATE HEPTAHYDRATE: 500 INJECTION, SOLUTION INTRAMUSCULAR; INTRAVENOUS at 18:27

## 2023-10-29 RX ADMIN — ENOXAPARIN SODIUM 30 MG: 100 INJECTION SUBCUTANEOUS at 08:59

## 2023-10-29 ASSESSMENT — PAIN SCALES - GENERAL
PAINLEVEL_OUTOF10: 10
PAINLEVEL_OUTOF10: 0
PAINLEVEL_OUTOF10: 10

## 2023-10-29 ASSESSMENT — PAIN - FUNCTIONAL ASSESSMENT: PAIN_FUNCTIONAL_ASSESSMENT: ACTIVITIES ARE NOT PREVENTED

## 2023-10-29 ASSESSMENT — PAIN DESCRIPTION - ORIENTATION
ORIENTATION: LEFT
ORIENTATION: LEFT

## 2023-10-29 ASSESSMENT — PAIN DESCRIPTION - LOCATION
LOCATION: ABDOMEN

## 2023-10-29 ASSESSMENT — PAIN DESCRIPTION - DESCRIPTORS
DESCRIPTORS: ACHING

## 2023-10-29 NOTE — PROGRESS NOTES
Pharmacy TPN Management Note    TPN indication: prolonged NPO status  3 days post op s/p laparotomy exploratory with lysis of adhesions    TPN type: Peripheral     Macronutrients:  Protein: 50g  Dextrose: 150g  Lipids: ---    Rate: 70.8 ml/hr    Labs:  Recent Labs     Units 10/29/23  0416 10/27/23  0931   NA mmol/L 138 137   K mmol/L 3.4* 3.8   CL mmol/L 105 106   CO2 mmol/L 28 27   MG mg/dL 2.1  --    PHOS MG/DL 4.8*  --    BUN MG/DL 7 5*     No results for input(s): \"AST\", \"ALT\" in the last 72 hours. Invalid input(s): \"BILIRUBIN\", \"AP\"  Recent Labs     Units 10/27/23  0931   WBC K/uL 5.4   HGB g/dL 11.7   HCT % 36.1       Electrolytes (dosed per LITER):  Na: 35 meq/L; K: 33 meq/L; Phos:0 mmol/L; M meq/L; Ca 4.5 meq/L    Other additives in TPN: multivitamins  and trace elements    Impression/Plan:   Ordered BMP, Mag, Phos daily   Calculated osmolarity of this peripheral TPN = 884.31  TPN macronutrient/rate changes: none  TPN electrolyte changes: increased potassium in TPN, removed Phos from TPN  TPN insulin changes: n/a     Pharmacy will continue to monitor enteral nutrition plan, electrolytes, renal function, and dietician recommendations and adjust parenteral nutrition as needed.     Thank you,  Angela Montgomery, [facility]

## 2023-10-29 NOTE — PROGRESS NOTES
End of Shift Note    Bedside shift change report given to Deb Tillman (oncoming nurse) by Jud Elias, MELY (offgoing nurse).   Report included the following information SBAR, Kardex, Intake/Output, MAR, and Recent Results    Shift worked:  7a-7p     Shift summary and any significant changes:     Dilaudid and ativan given according to [facility], prn hydralazine order for SBP >150, given once, ambulating in halls with , no BM no flatus     Concerns for physician to address:  none     Zone phone for oncoming shift:   [phone]         Jud Elias, RN

## 2023-10-29 NOTE — PLAN OF CARE
Problem: Discharge Planning  Goal: Discharge to home or other facility with appropriate resources  10/29/2023 0006 by Gonzalo Carroll RN  Outcome: Progressing  10/28/2023 1828 by Pamela Erazo RN  Outcome: Progressing     Problem: Safety - Adult  Goal: Free from fall injury  10/29/2023 0006 by Gonzalo Carroll RN  Outcome: Progressing  10/28/2023 1828 by Pamela Erazo RN  Outcome: Progressing  Flowsheets (Taken 10/28/2023 0750)  Free From Fall Injury: Instruct family/caregiver on patient safety     Problem: Pain  Goal: Verbalizes/displays adequate comfort level or baseline comfort level  10/29/2023 0006 by Gonzalo Carroll RN  Outcome: Progressing  10/28/2023 1828 by Pamela Erazo RN  Outcome: Progressing     Problem: Chronic Conditions and Co-morbidities  Goal: Patient's chronic conditions and co-morbidity symptoms are monitored and maintained or improved  10/29/2023 0006 by Gonzalo Carroll RN  Outcome: Progressing  10/28/2023 1828 by Pamela Erazo RN  Outcome: Progressing     Problem: Skin/Tissue Integrity  Goal: Absence of new skin breakdown  Description: 1. Monitor for areas of redness and/or skin breakdown  2. Assess vascular access sites hourly  3. Every 4-6 hours minimum:  Change oxygen saturation probe site  4. Every 4-6 hours:  If on nasal continuous positive airway pressure, respiratory therapy assess nares and determine need for appliance change or resting period.   10/29/2023 0006 by Gonzalo Carroll RN  Outcome: Progressing  10/28/2023 1828 by Pamela Erazo RN  Outcome: Progressing

## 2023-10-30 LAB
ANION GAP SERPL CALC-SCNC: 4 MMOL/L (ref 5–15)
BUN SERPL-MCNC: 7 MG/DL (ref 6–20)
BUN/CREAT SERPL: 14 (ref 12–20)
CALCIUM SERPL-MCNC: 9 MG/DL (ref 8.5–10.1)
CHLORIDE SERPL-SCNC: 101 MMOL/L (ref 97–108)
CO2 SERPL-SCNC: 32 MMOL/L (ref 21–32)
CREAT SERPL-MCNC: 0.5 MG/DL (ref 0.55–1.02)
GLUCOSE SERPL-MCNC: 169 MG/DL (ref 65–100)
MAGNESIUM SERPL-MCNC: 2.4 MG/DL (ref 1.6–2.4)
PHOSPHATE SERPL-MCNC: 4.5 MG/DL (ref 2.6–4.7)
POTASSIUM SERPL-SCNC: 4.3 MMOL/L (ref 3.5–5.1)
SODIUM SERPL-SCNC: 137 MMOL/L (ref 136–145)

## 2023-10-30 PROCEDURE — 6360000002 HC RX W HCPCS: Performed by: STUDENT IN AN ORGANIZED HEALTH CARE EDUCATION/TRAINING PROGRAM

## 2023-10-30 PROCEDURE — 2500000003 HC RX 250 WO HCPCS: Performed by: SURGERY

## 2023-10-30 PROCEDURE — 76937 US GUIDE VASCULAR ACCESS: CPT

## 2023-10-30 PROCEDURE — 83735 ASSAY OF MAGNESIUM: CPT

## 2023-10-30 PROCEDURE — 6370000000 HC RX 637 (ALT 250 FOR IP): Performed by: NURSE PRACTITIONER

## 2023-10-30 PROCEDURE — 80048 BASIC METABOLIC PNL TOTAL CA: CPT

## 2023-10-30 PROCEDURE — 1100000000 HC RM PRIVATE

## 2023-10-30 PROCEDURE — 2580000003 HC RX 258: Performed by: SURGERY

## 2023-10-30 PROCEDURE — 2500000003 HC RX 250 WO HCPCS: Performed by: NURSE PRACTITIONER

## 2023-10-30 PROCEDURE — 36415 COLL VENOUS BLD VENIPUNCTURE: CPT

## 2023-10-30 PROCEDURE — 6370000000 HC RX 637 (ALT 250 FOR IP): Performed by: SURGERY

## 2023-10-30 PROCEDURE — 84100 ASSAY OF PHOSPHORUS: CPT

## 2023-10-30 PROCEDURE — 6360000002 HC RX W HCPCS: Performed by: SURGERY

## 2023-10-30 RX ORDER — POLYETHYLENE GLYCOL 3350 17 G/17G
17 POWDER, FOR SOLUTION ORAL DAILY
Status: DISCONTINUED | OUTPATIENT
Start: 2023-10-30 | End: 2023-11-01

## 2023-10-30 RX ADMIN — SODIUM CHLORIDE, PRESERVATIVE FREE 10 ML: 5 INJECTION INTRAVENOUS at 09:18

## 2023-10-30 RX ADMIN — ENOXAPARIN SODIUM 30 MG: 100 INJECTION SUBCUTANEOUS at 09:17

## 2023-10-30 RX ADMIN — POLYETHYLENE GLYCOL 3350 17 G: 17 POWDER, FOR SOLUTION ORAL at 13:13

## 2023-10-30 RX ADMIN — HYDROMORPHONE HYDROCHLORIDE 1 MG: 1 INJECTION, SOLUTION INTRAMUSCULAR; INTRAVENOUS; SUBCUTANEOUS at 09:12

## 2023-10-30 RX ADMIN — HYDROMORPHONE HYDROCHLORIDE 1 MG: 1 INJECTION, SOLUTION INTRAMUSCULAR; INTRAVENOUS; SUBCUTANEOUS at 17:20

## 2023-10-30 RX ADMIN — MAGNESIUM SULFATE HEPTAHYDRATE: 500 INJECTION, SOLUTION INTRAMUSCULAR; INTRAVENOUS at 18:49

## 2023-10-30 RX ADMIN — ACETAMINOPHEN 650 MG: 160 SOLUTION ORAL at 03:23

## 2023-10-30 RX ADMIN — HYDROMORPHONE HYDROCHLORIDE 1 MG: 1 INJECTION, SOLUTION INTRAMUSCULAR; INTRAVENOUS; SUBCUTANEOUS at 03:26

## 2023-10-30 RX ADMIN — HYDROMORPHONE HYDROCHLORIDE 1 MG: 1 INJECTION, SOLUTION INTRAMUSCULAR; INTRAVENOUS; SUBCUTANEOUS at 21:29

## 2023-10-30 RX ADMIN — HYDROMORPHONE HYDROCHLORIDE 1 MG: 1 INJECTION, SOLUTION INTRAMUSCULAR; INTRAVENOUS; SUBCUTANEOUS at 13:14

## 2023-10-30 ASSESSMENT — PAIN DESCRIPTION - LOCATION
LOCATION: ABDOMEN
LOCATION: ABDOMEN
LOCATION: ABDOMEN;GENERALIZED
LOCATION: ABDOMEN

## 2023-10-30 ASSESSMENT — PAIN DESCRIPTION - DESCRIPTORS
DESCRIPTORS: CRAMPING
DESCRIPTORS: SQUEEZING;CRAMPING
DESCRIPTORS: ACHING
DESCRIPTORS: CRAMPING
DESCRIPTORS: CRAMPING;SQUEEZING

## 2023-10-30 ASSESSMENT — PAIN DESCRIPTION - ORIENTATION
ORIENTATION: RIGHT;LEFT;MID
ORIENTATION: MID
ORIENTATION: MID
ORIENTATION: LEFT;RIGHT;MID

## 2023-10-30 ASSESSMENT — PAIN - FUNCTIONAL ASSESSMENT
PAIN_FUNCTIONAL_ASSESSMENT: ACTIVITIES ARE NOT PREVENTED

## 2023-10-30 ASSESSMENT — PAIN SCALES - GENERAL
PAINLEVEL_OUTOF10: 10
PAINLEVEL_OUTOF10: 8
PAINLEVEL_OUTOF10: 7
PAINLEVEL_OUTOF10: 10
PAINLEVEL_OUTOF10: 10
PAINLEVEL_OUTOF10: 0
PAINLEVEL_OUTOF10: 10
PAINLEVEL_OUTOF10: 6
PAINLEVEL_OUTOF10: 5
PAINLEVEL_OUTOF10: 10

## 2023-10-30 ASSESSMENT — PAIN DESCRIPTION - ONSET: ONSET: GRADUAL

## 2023-10-30 ASSESSMENT — PAIN DESCRIPTION - PAIN TYPE
TYPE: SURGICAL PAIN
TYPE: SURGICAL PAIN

## 2023-10-30 ASSESSMENT — PAIN DESCRIPTION - FREQUENCY: FREQUENCY: CONTINUOUS

## 2023-10-30 NOTE — PROGRESS NOTES
Pharmacy TPN Management Note    TPN indication: prolonged NPO status  3 days post op s/p laparotomy exploratory with lysis of adhesions    TPN type: Peripheral     Macronutrients:  Protein: 50g  Dextrose: 150g  Lipids: ---    Rate: 70.8 ml/hr    Labs:  Recent Labs     Units 10/30/23  0632 10/29/23  0416   NA mmol/L 137 138   K mmol/L 4.3 3.4*   CL mmol/L 101 105   CO2 mmol/L 32 28   MG mg/dL 2.4 2.1   PHOS MG/DL 4.5 4.8*   BUN MG/DL 7 7     No results for input(s): \"AST\", \"ALT\" in the last 72 hours. Invalid input(s): \"BILIRUBIN\", \"AP\"  No results for input(s): \"WBC\", \"HGB\", \"HCT\", \"APTT\", \"INR\", \"TRIG\", \"PREALBUMIN\" in the last 72 hours. Electrolytes (dosed per LITER):  Na: 35 meq/L; K: 33 meq/L; Phos:0 mmol/L; M meq/L; Ca 4.5 meq/L    Other additives in TPN: multivitamins  and trace elements    Impression/Plan:   TPN macronutrient/rate changes: none  TPN electrolyte changes: no changes  TPN insulin changes: n/a     Pharmacy will continue to monitor enteral nutrition plan, electrolytes, renal function, and dietician recommendations and adjust parenteral nutrition as needed.     Thank you,  Steva Hatchet, [facility]

## 2023-10-30 NOTE — PROGRESS NOTES
voidEnd of Shift Note    Bedside shift change report given to Basim (oncoming nurse) by Lisa Lomas RN (offgoing nurse). Report included the following information SBAR, Kardex, and MAR    Shift worked:  [id number]     Shift summary and any significant changes:     Pt up to chair > an hour, tolerating extended dwell IV, TPN given at 1800. none     Concerns for physician to address:  none     Zone phone for oncoming shift:   [phone]       Activity:     Number times ambulated in hallways past shift: 0  Number of times OOB to chair past shift: 0    Cardiac:   Cardiac Monitoring: No           Access:  Current line(s): PIV     Genitourinary:   Urinary status: voiding    Respiratory:      Chronic home O2 use?: NO  Incentive spirometer at bedside: Yes       GI:     Current diet:  Diet NPO  TPN-Adult 2-in-1 Peripheral Line (Custom)  Passing flatus: YES  Tolerating current diet: YES       Pain Management:   Patient states pain is manageable on current regimen: YES    Skin:     Interventions: increase time out of bed    Patient Safety:  Fall Score:    Interventions: bed/chair alarm, assistive device (walker, cane.  etc), gripper socks, pt to call before getting OOB, stay with me (per policy), and tele-sitter       Length of Stay:  Expected LOS: 2  Actual LOS: 7      Lisa Lomas RN

## 2023-10-30 NOTE — PROGRESS NOTES
End of Shift Note    Bedside shift change report given to Markus Gibbons RN (oncoming nurse) by Stacie Roberts RN (offgoing nurse). Report included the following information SBAR, Kardex, Intake/Output, MAR, and Recent Results    Shift worked:  8062-2456     Shift summary and any significant changes:     Messi had 1 episode of fever overnight. - PRN Tylenol given and eduation given on use and importance of incentive spiromoter. Patient as cooperative with the teaching but needs encouragement. Concerns for physician to address:  Plan of care.      Zone phone for oncoming shift:              Length of Stay:  Expected LOS: 2  Actual LOS: 7      Stacie Roberts RN

## 2023-10-31 LAB
ANION GAP SERPL CALC-SCNC: 3 MMOL/L (ref 5–15)
BUN SERPL-MCNC: 8 MG/DL (ref 6–20)
BUN/CREAT SERPL: 19 (ref 12–20)
CALCIUM SERPL-MCNC: 8.5 MG/DL (ref 8.5–10.1)
CHLORIDE SERPL-SCNC: 104 MMOL/L (ref 97–108)
CO2 SERPL-SCNC: 31 MMOL/L (ref 21–32)
CREAT SERPL-MCNC: 0.43 MG/DL (ref 0.55–1.02)
GLUCOSE SERPL-MCNC: 105 MG/DL (ref 65–100)
MAGNESIUM SERPL-MCNC: 2.5 MG/DL (ref 1.6–2.4)
PHOSPHATE SERPL-MCNC: 4.4 MG/DL (ref 2.6–4.7)
POTASSIUM SERPL-SCNC: 4.2 MMOL/L (ref 3.5–5.1)
SODIUM SERPL-SCNC: 138 MMOL/L (ref 136–145)

## 2023-10-31 PROCEDURE — 2500000003 HC RX 250 WO HCPCS: Performed by: SURGERY

## 2023-10-31 PROCEDURE — 1100000000 HC RM PRIVATE

## 2023-10-31 PROCEDURE — 80048 BASIC METABOLIC PNL TOTAL CA: CPT

## 2023-10-31 PROCEDURE — 6360000002 HC RX W HCPCS: Performed by: STUDENT IN AN ORGANIZED HEALTH CARE EDUCATION/TRAINING PROGRAM

## 2023-10-31 PROCEDURE — 84100 ASSAY OF PHOSPHORUS: CPT

## 2023-10-31 PROCEDURE — 6370000000 HC RX 637 (ALT 250 FOR IP): Performed by: SURGERY

## 2023-10-31 PROCEDURE — 2500000003 HC RX 250 WO HCPCS: Performed by: NURSE PRACTITIONER

## 2023-10-31 PROCEDURE — 83735 ASSAY OF MAGNESIUM: CPT

## 2023-10-31 PROCEDURE — 2580000003 HC RX 258: Performed by: SURGERY

## 2023-10-31 PROCEDURE — 6360000002 HC RX W HCPCS: Performed by: SURGERY

## 2023-10-31 PROCEDURE — 36415 COLL VENOUS BLD VENIPUNCTURE: CPT

## 2023-10-31 PROCEDURE — 6370000000 HC RX 637 (ALT 250 FOR IP): Performed by: NURSE PRACTITIONER

## 2023-10-31 RX ORDER — TRAMADOL HYDROCHLORIDE 50 MG/1
50 TABLET ORAL EVERY 6 HOURS PRN
Status: DISCONTINUED | OUTPATIENT
Start: 2023-10-31 | End: 2023-11-02 | Stop reason: HOSPADM

## 2023-10-31 RX ADMIN — SODIUM CHLORIDE, PRESERVATIVE FREE 10 ML: 5 INJECTION INTRAVENOUS at 21:17

## 2023-10-31 RX ADMIN — LORAZEPAM 1 MG: 2 INJECTION INTRAMUSCULAR; INTRAVENOUS at 12:01

## 2023-10-31 RX ADMIN — LORAZEPAM 1 MG: 2 INJECTION INTRAMUSCULAR; INTRAVENOUS at 19:43

## 2023-10-31 RX ADMIN — SODIUM CHLORIDE, PRESERVATIVE FREE 10 ML: 5 INJECTION INTRAVENOUS at 09:17

## 2023-10-31 RX ADMIN — ENOXAPARIN SODIUM 30 MG: 100 INJECTION SUBCUTANEOUS at 09:11

## 2023-10-31 RX ADMIN — ACETAMINOPHEN 650 MG: 160 SOLUTION ORAL at 12:04

## 2023-10-31 RX ADMIN — MAGNESIUM SULFATE HEPTAHYDRATE: 500 INJECTION, SOLUTION INTRAMUSCULAR; INTRAVENOUS at 18:51

## 2023-10-31 RX ADMIN — HYDROMORPHONE HYDROCHLORIDE 1 MG: 1 INJECTION, SOLUTION INTRAMUSCULAR; INTRAVENOUS; SUBCUTANEOUS at 02:02

## 2023-10-31 RX ADMIN — POLYETHYLENE GLYCOL 3350 17 G: 17 POWDER, FOR SOLUTION ORAL at 09:11

## 2023-10-31 RX ADMIN — HYDROMORPHONE HYDROCHLORIDE 1 MG: 1 INJECTION, SOLUTION INTRAMUSCULAR; INTRAVENOUS; SUBCUTANEOUS at 06:25

## 2023-10-31 RX ADMIN — NALOXEGOL OXALATE 25 MG: 25 TABLET, FILM COATED ORAL at 13:29

## 2023-10-31 ASSESSMENT — PAIN DESCRIPTION - DESCRIPTORS
DESCRIPTORS: CRAMPING;ACHING;SORE
DESCRIPTORS: CRAMPING
DESCRIPTORS: ACHING;CRAMPING;SORE;TENDER

## 2023-10-31 ASSESSMENT — PAIN DESCRIPTION - ORIENTATION
ORIENTATION: RIGHT;LEFT;MID
ORIENTATION: RIGHT;LEFT;MID

## 2023-10-31 ASSESSMENT — PAIN SCALES - GENERAL
PAINLEVEL_OUTOF10: 7
PAINLEVEL_OUTOF10: 10
PAINLEVEL_OUTOF10: 5
PAINLEVEL_OUTOF10: 10
PAINLEVEL_OUTOF10: 9

## 2023-10-31 ASSESSMENT — PAIN - FUNCTIONAL ASSESSMENT
PAIN_FUNCTIONAL_ASSESSMENT: PREVENTS OR INTERFERES SOME ACTIVE ACTIVITIES AND ADLS
PAIN_FUNCTIONAL_ASSESSMENT: ACTIVITIES ARE NOT PREVENTED
PAIN_FUNCTIONAL_ASSESSMENT: ACTIVITIES ARE NOT PREVENTED

## 2023-10-31 ASSESSMENT — PAIN DESCRIPTION - LOCATION
LOCATION: ABDOMEN
LOCATION: ABDOMEN
LOCATION: ABDOMEN;INCISION

## 2023-10-31 NOTE — PROGRESS NOTES
End of Shift Note    Bedside shift change report given to Tita Rodriguez LPN (oncoming nurse) by Ruel Tam RN (offgoing nurse). Report included the following information SBAR, Procedure Summary, Intake/Output, MAR, Accordion, and Recent Results    Shift worked:  7pm-7am     Shift summary and any significant changes:     Patient ambulating in the room and crow way x1 last night. PPN infusing per [facility] order. Patient having abd pain, PRN dilaudid given with good effect throughout the night. Patient voiding into bedside commode throughout the night as well. ABD dressing is Dry and intact, old draining noted.       Concerns for physician to address:       Zone phone for oncoming shift:   [phone]       Activity:     Number times ambulated in hallways past shift: 1  Number of times OOB to chair past shift: 1    Cardiac:   Cardiac Monitoring: No           Access:  Current line(s): midline     Genitourinary:   Urinary status: voiding    Respiratory:      Chronic home O2 use?: NO  Incentive spirometer at bedside: YES       GI:     Current diet:  Diet NPO  TPN-Adult 2-in-1 Peripheral Line (Custom)  Passing flatus: NO  Tolerating current diet: YES       Pain Management:   Patient states pain is manageable on current regimen: YES    Skin:     Interventions: turn team, float heels, increase time out of bed, and limit briefs    Patient Safety:  Fall Score:    Interventions:        Length of Stay:  Expected LOS: 2  Actual LOS: [address], RN

## 2023-10-31 NOTE — PROGRESS NOTES
End of Shift Note    Bedside shift change report given to MELY Frankel (oncoming nurse) by Román Haas LPN (offgoing nurse). Report included the following information SBAR, Kardex, Intake/Output, MAR, and Recent Results    Shift worked:  7a-7p     Shift summary and any significant changes:     Pt up to the chair x 3 this shift. Ambulated in the halls x 3. Pt advanced to clear liquid diet this AM. Tolerated clear liquid diet. Pt given naloxegol this shift x 1. Pt states she produced 5 liquid bowel movements. This nurse only visualized 2 bowel movements. Diet advanced to full liquid @ 1630. Pt tolerating full liquid. Dilaudid and oxycodone discontinued this shift. Given tylenol x 1 this shift. Given ativan IV x 1. New bag and tubing for PPN hung this shift. No complaints of nausea.      Concerns for physician to address:  none     Zone phone for oncoming shift:                Román Haas LPN

## 2023-10-31 NOTE — PROGRESS NOTES
Pharmacy TPN Management Note    TPN indication: prolonged NPO status  3 days post op s/p laparotomy exploratory with lysis of adhesions    TPN type: Peripheral     Macronutrients:  Protein: 50g  Dextrose: 150g  Lipids: ---    Rate: 70.8 ml/hr    Labs:  Recent Labs     Units 10/31/23  0556 10/30/23  0632 10/29/23  0416   NA mmol/L 138 137 138   K mmol/L 4.2 4.3 3.4*   CL mmol/L 104 101 105   CO2 mmol/L 31 32 28   MG mg/dL 2.5* 2.4 2.1   PHOS MG/DL 4.4 4.5 4.8*   BUN MG/DL 8 7 7     No results for input(s): \"AST\", \"ALT\" in the last 72 hours. Invalid input(s): \"BILIRUBIN\", \"AP\"  No results for input(s): \"WBC\", \"HGB\", \"HCT\", \"APTT\", \"INR\", \"TRIG\", \"PREALBUMIN\" in the last 72 hours. Electrolytes (dosed per LITER):  Na: 35 meq/L; K: 33 meq/L; Phos:0 mmol/L; M meq/L; Ca 4.5 meq/L    Other additives in TPN: multivitamins  and trace elements    Impression/Plan:   TPN macronutrient/rate changes: none  TPN electrolyte changes: none  TPN insulin changes: none     Pharmacy will continue to monitor enteral nutrition plan, electrolytes, renal function, and dietician recommendations and adjust parenteral nutrition as needed.     Thank you,  Ashley Sun, [facility]

## 2023-10-31 NOTE — PROGRESS NOTES
Comprehensive Nutrition Assessment    Type and Reason for Visit:  Reassess    Nutrition Recommendations/Plan:   Continue diet advancement per surgeon   Would consider letting PPN run out tomorrow if pt is tolerating diet advancement  Please document % meals and supplements consumed in flowsheet I/O's under intake      Malnutrition Assessment:  Malnutrition Status: At risk for malnutrition (Comment) (10/28/23 1212)    Context:  Acute Illness     Findings of the 6 clinical characteristics of malnutrition:  Energy Intake:  50% or less of estimated energy requirements for 5 or more days  Weight Loss:  Greater than 7.5% over 3 months     Body Fat Loss:  No significant body fat loss     Muscle Mass Loss:  No significant muscle mass loss    Fluid Accumulation:  No significant fluid accumulation     Strength:  Not Performed    Nutrition Assessment:  Chart reviewed, pt started on clear liquids this morning, per surgeon she is allowed to have solids once she starts stooling. PPN remains at recommended rate which meets 60% kcal and 100% protein needs. Labs reviewed, lytes WNL and BG well controlled. Last BM was 10/22. PPN can likely be allowed to run out once diet advanced past clears (assumed within the next 24h). Will continue to monitor PO intake. Nutrition Related Findings:    Meds: movantik, glycolax. BM: 10/22 Wound Type: Surgical Incision (abdomen)       Current Nutrition Intake & Therapies:    Average Meal Intake:  (diet just advanced to clears)  Average Supplements Intake: NPO  TPN-Adult 2-in-1 Peripheral Line (Custom)  TPN-Adult 2-in-1 Peripheral Line (Custom)  ADULT DIET;  Clear Liquid  Current Parenteral Nutrition Orders:  Type and Formula: 2-in-1 Custom   Lipids: None  Duration: Continuous  Rate/Volume: 1510g Dextrose, 50g AA  Current PN Order Provides: 710kcals  Goal PN Orders Provides:      Anthropometric Measures:  Height: 152.4 cm (5')  Ideal Body Weight (IBW): 100 lbs (45 kg)       Current

## 2023-11-01 PROCEDURE — 6360000002 HC RX W HCPCS: Performed by: STUDENT IN AN ORGANIZED HEALTH CARE EDUCATION/TRAINING PROGRAM

## 2023-11-01 PROCEDURE — 6360000002 HC RX W HCPCS: Performed by: SURGERY

## 2023-11-01 PROCEDURE — 1100000000 HC RM PRIVATE

## 2023-11-01 PROCEDURE — 99024 POSTOP FOLLOW-UP VISIT: CPT | Performed by: NURSE PRACTITIONER

## 2023-11-01 PROCEDURE — 2580000003 HC RX 258: Performed by: SURGERY

## 2023-11-01 RX ADMIN — SODIUM CHLORIDE, PRESERVATIVE FREE 10 ML: 5 INJECTION INTRAVENOUS at 09:45

## 2023-11-01 RX ADMIN — SODIUM CHLORIDE, PRESERVATIVE FREE 10 ML: 5 INJECTION INTRAVENOUS at 21:23

## 2023-11-01 RX ADMIN — LORAZEPAM 1 MG: 2 INJECTION INTRAMUSCULAR; INTRAVENOUS at 19:18

## 2023-11-01 RX ADMIN — LORAZEPAM 1 MG: 2 INJECTION INTRAMUSCULAR; INTRAVENOUS at 01:30

## 2023-11-01 RX ADMIN — ENOXAPARIN SODIUM 30 MG: 100 INJECTION SUBCUTANEOUS at 09:46

## 2023-11-01 RX ADMIN — LORAZEPAM 1 MG: 2 INJECTION INTRAMUSCULAR; INTRAVENOUS at 11:06

## 2023-11-01 ASSESSMENT — PAIN SCALES - GENERAL: PAINLEVEL_OUTOF10: 8

## 2023-11-01 NOTE — PROGRESS NOTES
Pharmacy TPN Management Note    TPN indication: prolonged NPO status  3 days post op s/p laparotomy exploratory with lysis of adhesions    TPN type: Peripheral     Macronutrients:  Protein: 50g  Dextrose: 150g  Lipids: ---    Rate: 42 ml/hr    Labs:  Recent Labs     Units 10/31/23  0556 10/30/23  0632   NA mmol/L 138 137   K mmol/L 4.2 4.3   CL mmol/L 104 101   CO2 mmol/L 31 32   MG mg/dL 2.5* 2.4   PHOS MG/DL 4.4 4.5   BUN MG/DL 8 7     No results for input(s): \"AST\", \"ALT\" in the last 72 hours. Invalid input(s): \"BILIRUBIN\", \"AP\"  No results for input(s): \"WBC\", \"HGB\", \"HCT\", \"APTT\", \"INR\", \"TRIG\", \"PREALBUMIN\" in the last 72 hours. Electrolytes (dosed per LITER):  Na: 35 meq/L; K: 33 meq/L; Phos:0 mmol/L; M meq/L; Ca 4.5 meq/L    Other additives in TPN: multivitamins  and trace elements    Impression/Plan:   TPN macronutrient/rate changes: none  TPN electrolyte changes: none  TPN insulin changes: none  Weaning TPN today - none re-ordered     Pharmacy will continue to monitor enteral nutrition plan, electrolytes, renal function, and dietician recommendations and adjust parenteral nutrition as needed.     Thank you,  Xavi Leone, [facility]

## 2023-11-01 NOTE — CARE COORDINATION
Transition of Care Plan:     RUR: 5%  Prior Level of Functioning:   Disposition: Home w/family  If SNF or IPR: Date FOC offered:   Date FOC received:   Accepting facility:   Date authorization started with reference number:   Date authorization received and expires: Follow up appointments: on AVS  DME needed: n/a  Transportation at discharge:   IM/IMM Medicare/ letter given: n/a  Is patient a  and connected with [facility]? If yes, was [employer] transfer form completed and [facility] notified? Caregiver Contact:   Discharge Caregiver contacted prior to discharge? Care Conference needed? Barriers to discharge:     Patient is expected to d/c tomorrow. CM will continue to follow.     Niurka Cherry  Ext [phone]

## 2023-11-01 NOTE — PLAN OF CARE
Problem: Discharge Planning  Goal: Discharge to home or other facility with appropriate resources  Outcome: Progressing     Problem: Safety - Adult  Goal: Free from fall injury  10/31/2023 2202 by Sofía Bowie RN  Outcome: Progressing  10/31/2023 1045 by Jose J Grande LPN  Outcome: Progressing     Problem: Pain  Goal: Verbalizes/displays adequate comfort level or baseline comfort level  Outcome: Progressing     Problem: Chronic Conditions and Co-morbidities  Goal: Patient's chronic conditions and co-morbidity symptoms are monitored and maintained or improved  Outcome: Progressing     Problem: Skin/Tissue Integrity  Goal: Absence of new skin breakdown  Description: 1. Monitor for areas of redness and/or skin breakdown  2. Assess vascular access sites hourly  3. Every 4-6 hours minimum:  Change oxygen saturation probe site  4. Every 4-6 hours:  If on nasal continuous positive airway pressure, respiratory therapy assess nares and determine need for appliance change or resting period.   Outcome: Progressing

## 2023-11-01 NOTE — PROGRESS NOTES
End of Shift Note    Bedside shift change report given to MELY Frankel (oncoming nurse) by Becca Lopez RN (offgoing nurse). Report included the following information SBAR, Procedure Summary, Intake/Output, MAR, Accordion, and Recent Results    Shift worked:  7a 7P     Shift summary and any significant changes:     Patient ambulating in the room  and to the bathroom. Patient having abd pain and diarrhea throughout the shift, MD aware. ABD dressing is Dry and intact, old draining noted. Concerns for physician to address:  Note above     Zone phone for oncoming shift:   [phone]       Activity:     Number times ambulated in hallways past shift: 1  Number of times OOB to chair past shift: 1    Cardiac:   Cardiac Monitoring: No           Access:  Current line(s): midline     Genitourinary:   Urinary status: voiding    Respiratory:      Chronic home O2 use?: NO  Incentive spirometer at bedside: YES       GI:     Current diet:  TPN-Adult 2-in-1 Peripheral Line (Custom)  ADULT DIET;  Regular  Passing flatus: NO  Tolerating current diet: YES       Pain Management:   Patient states pain is manageable on current regimen: YES    Skin:     Interventions: turn team, float heels, increase time out of bed, and limit briefs    Patient Safety:  Fall Score:    Interventions:        Length of Stay:  Expected LOS: 2  Actual LOS: 9      Zoila Russell RN

## 2023-11-01 NOTE — PROGRESS NOTES
Patient alert and oriented x 4. Patient able to ambulates independently. RA. VSS. Patient refused to take any laxatives or have AM labs completed. Provider made aware of patients refusal.Patient be placed in lowest position with call light in reach.

## 2023-11-02 VITALS
RESPIRATION RATE: 20 BRPM | WEIGHT: 96 LBS | TEMPERATURE: 98.2 F | SYSTOLIC BLOOD PRESSURE: 130 MMHG | HEART RATE: 74 BPM | OXYGEN SATURATION: 96 % | HEIGHT: 60 IN | DIASTOLIC BLOOD PRESSURE: 72 MMHG | BODY MASS INDEX: 18.85 KG/M2

## 2023-11-02 PROCEDURE — 2580000003 HC RX 258: Performed by: SURGERY

## 2023-11-02 PROCEDURE — 99024 POSTOP FOLLOW-UP VISIT: CPT | Performed by: NURSE PRACTITIONER

## 2023-11-02 RX ADMIN — SODIUM CHLORIDE, PRESERVATIVE FREE 10 ML: 5 INJECTION INTRAVENOUS at 10:24

## 2023-11-02 NOTE — DISCHARGE SUMMARY
Post- Surgical Discharge Summary    Patient ID:  Bobbe Mohs  [id number]  female  58 y.o.  1960    Admit date: 10/23/2023    Discharge date: 11/02/23     Admitting Physician: Leopoldo Jumbo, MD     Date of Surgery:   10/25/2023     Preoperative Diagnosis:  Small bowel obstruction ([address]) [K56.609]    Postoperative Diagnosis:   * No post-op diagnosis entered *    Procedure(s):  LAPAROTOMY EXPLORATORY WITH LYSIS OF ADHESIONS     Anesthesia Type:   General     Surgeon: Leopoldo Jumbo, MD                            HPI:  Pt is a 58 y.o. female who has a history of Small bowel obstruction ([address]) [K56.609] who presents at this time for a exploratory laparotomy with lysis of adhesions. 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 Intra-operative complications: None; patient tolerated the procedure well. Was taken to the PACU in stable condition and then transferred to the surgical floor. Postoperative Pain Management:  Dilaudid then reduced to Ultram due to slow bowel function. Postoperative transfusions:    Number of units banked PRBCs =   none     Post Op complications: Prolonged post-op ileus    Incisions  - clean, dry and intact. No significant erythema or swelling. Wound(s) appear to be healing without any evidence of infection.       Patient mobilized with nursing and was found to be safe

## 2023-11-02 NOTE — PROGRESS NOTES
Patient alert and oriented x 4. Patient ambulates in room independently. VSS. RA. Patient midline dressing has scant old drainage present. No complaints of pain. Central line care completed and documented. Patient states she is still having many BMS. Patient refuse AM labs. Provider aware. Bed placed in lowest position with call light in reach.

## 2023-11-02 NOTE — PROGRESS NOTES
DISCHARGE NOTE FROM [facility] NURSE    Patient determined to be stable for discharge by attending provider. I have reviewed the discharge instructions and follow-up appointments with the Patient and Spouse. They verbalized understanding and all questions were answered to their satisfaction. No complaints or further questions were expressed. No new medication scripts Appropriate educational materials and medication side effect teaching were provided. PIV were removed prior to discharge. Patient did not discharge with any line, gomes, or drain. All personal items collected during admission were returned to the patient prior to discharge. Post-op patient: Yes - Patient given post-op discharge kit and instructed on use.       Fredy Sabillon RN

## 2023-11-02 NOTE — PROGRESS NOTES
PCP hospital follow-up transitional care appointment has been scheduled with Dr. Willie Galloway on 11/22/23 1500 as previously scheduled. Pending patient discharge.   Jenny Bolivar, Care Management Assistant

## 2023-11-02 NOTE — PLAN OF CARE
Problem: Discharge Planning  Goal: Discharge to home or other facility with appropriate resources  11/2/2023 1117 by Tamir Archuleta RN  Outcome: Adequate for Discharge  11/1/2023 2353 by Raul Joyce RN  Outcome: Progressing     Problem: Safety - Adult  Goal: Free from fall injury  11/2/2023 1117 by Tamir Archuleta RN  Outcome: Adequate for Discharge  11/1/2023 2353 by Raul Joyce RN  Outcome: Progressing     Problem: Pain  Goal: Verbalizes/displays adequate comfort level or baseline comfort level  11/2/2023 1117 by Tamir Archuleta RN  Outcome: Adequate for Discharge  11/1/2023 2353 by Raul Joyce RN  Outcome: Progressing     Problem: Chronic Conditions and Co-morbidities  Goal: Patient's chronic conditions and co-morbidity symptoms are monitored and maintained or improved  11/2/2023 1117 by Tamir Archuleta RN  Outcome: Adequate for Discharge  11/1/2023 2353 by Raul Joyce RN  Outcome: Progressing     Problem: Skin/Tissue Integrity  Goal: Absence of new skin breakdown  Description: 1. Monitor for areas of redness and/or skin breakdown  2. Assess vascular access sites hourly  3. Every 4-6 hours minimum:  Change oxygen saturation probe site  4. Every 4-6 hours:  If on nasal continuous positive airway pressure, respiratory therapy assess nares and determine need for appliance change or resting period.   11/2/2023 1117 by Tamir Archuleta RN  Outcome: Adequate for Discharge  11/1/2023 2353 by Raul Joyce RN  Outcome: Progressing

## 2023-11-08 ENCOUNTER — TELEPHONE (OUTPATIENT)
Age: 63
End: 2023-11-08

## 2023-11-08 NOTE — TELEPHONE ENCOUNTER
Pt called stating she wants to cancel her appointment with LUCIA Beck on 02/23 and does not wish to R/s due to her having to recover from abdominal surgery.

## 2023-11-13 ENCOUNTER — TELEPHONE (OUTPATIENT)
Age: 63
End: 2023-11-13

## 2023-11-13 NOTE — TELEPHONE ENCOUNTER
Patient would like to speak with a Nurse regarding the date of her PO f/u date    See previous note. ..

## 2023-11-13 NOTE — TELEPHONE ENCOUNTER
Spoke with patient and spouse 2 identifiers. S/P  LAPAROTOMY EXPLORATORY, LYSIS OF ADHESIONS   10/25/23. OK with post op appointment 11/15/2023 with Dr. Riri Solis. Appointment was originally scheduled with Dr. Chip Boston 11/8/23 but was canceled due to provider in [address] [place], [place] [address]. Does not want to wait another week.

## 2023-11-13 NOTE — TELEPHONE ENCOUNTER
Patients spouse called and stated that Mrs Dori Arriola was very upset that her 2 week P/O f/u date does not fall on the date that  that did her surgery is here. Patient wants her original 2 week date rescheduled to a date that  that did her surgery will be here.

## 2023-11-22 ENCOUNTER — OFFICE VISIT (OUTPATIENT)
Age: 63
End: 2023-11-22

## 2023-11-22 VITALS
RESPIRATION RATE: 16 BRPM | OXYGEN SATURATION: 98 % | TEMPERATURE: 98.3 F | SYSTOLIC BLOOD PRESSURE: 129 MMHG | BODY MASS INDEX: 19.04 KG/M2 | HEIGHT: 60 IN | HEART RATE: 84 BPM | WEIGHT: 97 LBS | DIASTOLIC BLOOD PRESSURE: 64 MMHG

## 2023-11-22 DIAGNOSIS — G25.82 STIFF-MAN SYNDROME: Primary | ICD-10-CM

## 2023-11-22 DIAGNOSIS — Z09 POSTOPERATIVE FOLLOW-UP: Primary | ICD-10-CM

## 2023-11-22 PROCEDURE — 99024 POSTOP FOLLOW-UP VISIT: CPT | Performed by: SURGERY

## 2023-11-22 RX ORDER — DIAZEPAM 5 MG/1
TABLET ORAL
Qty: 90 TABLET | Refills: 5 | Status: SHIPPED | OUTPATIENT
Start: 2023-11-22 | End: 2024-05-23

## 2023-11-22 RX ORDER — ACETAMINOPHEN AND CODEINE PHOSPHATE 300; 30 MG/1; MG/1
TABLET ORAL
COMMUNITY
Start: 2023-11-09

## 2023-11-22 NOTE — TELEPHONE ENCOUNTER
Pt , Tania Renteria called to request a medication refill for Pt on diazePAM (VALIUM) 5 MG tablet       Please send to: [address] [facility], [place], [address] - P [phone] - F [phone]

## 2023-12-02 ENCOUNTER — HOSPITAL ENCOUNTER (EMERGENCY)
Facility: HOSPITAL | Age: 63
Discharge: HOME | End: 2023-12-02
Attending: EMERGENCY MEDICINE
Payer: COMMERCIAL

## 2023-12-02 VITALS
HEART RATE: 90 BPM | WEIGHT: 95.02 LBS | SYSTOLIC BLOOD PRESSURE: 155 MMHG | DIASTOLIC BLOOD PRESSURE: 80 MMHG | OXYGEN SATURATION: 100 % | TEMPERATURE: 98 F | RESPIRATION RATE: 14 BRPM | HEIGHT: 60 IN | BODY MASS INDEX: 18.65 KG/M2

## 2023-12-02 DIAGNOSIS — T81.40XA POSTOPERATIVE INFECTION, UNSPECIFIED TYPE, INITIAL ENCOUNTER: Primary | ICD-10-CM

## 2023-12-02 LAB
ALBUMIN SERPL-MCNC: 3.5 G/DL (ref 3.5–5)
ALBUMIN/GLOB SERPL: 0.7 (ref 1.1–2.2)
ALP SERPL-CCNC: 55 U/L (ref 45–117)
ALT SERPL-CCNC: 14 U/L (ref 12–78)
ANION GAP SERPL CALC-SCNC: 3 MMOL/L (ref 5–15)
AST SERPL-CCNC: 10 U/L (ref 15–37)
BASOPHILS # BLD: 0.1 K/UL (ref 0–0.1)
BASOPHILS NFR BLD: 1 % (ref 0–1)
BILIRUB SERPL-MCNC: 0.3 MG/DL (ref 0.2–1)
BUN SERPL-MCNC: 14 MG/DL (ref 6–20)
BUN/CREAT SERPL: 22 (ref 12–20)
CALCIUM SERPL-MCNC: 9.6 MG/DL (ref 8.5–10.1)
CHLORIDE SERPL-SCNC: 104 MMOL/L (ref 97–108)
CO2 SERPL-SCNC: 31 MMOL/L (ref 21–32)
CREAT SERPL-MCNC: 0.64 MG/DL (ref 0.55–1.02)
DIFFERENTIAL METHOD BLD: ABNORMAL
EOSINOPHIL # BLD: 0.2 K/UL (ref 0–0.4)
EOSINOPHIL NFR BLD: 2 % (ref 0–7)
ERYTHROCYTE [DISTWIDTH] IN BLOOD BY AUTOMATED COUNT: 12.1 % (ref 11.5–14.5)
GLOBULIN SER CALC-MCNC: 4.7 G/DL (ref 2–4)
GLUCOSE SERPL-MCNC: 107 MG/DL (ref 65–100)
HCT VFR BLD AUTO: 37.9 % (ref 35–47)
HGB BLD-MCNC: 12.3 G/DL (ref 11.5–16)
IMM GRANULOCYTES # BLD AUTO: 0 K/UL (ref 0–0.04)
IMM GRANULOCYTES NFR BLD AUTO: 0 % (ref 0–0.5)
LYMPHOCYTES # BLD: 2.1 K/UL (ref 0.8–3.5)
LYMPHOCYTES NFR BLD: 23 % (ref 12–49)
MCH RBC QN AUTO: 30.6 PG (ref 26–34)
MCHC RBC AUTO-ENTMCNC: 32.5 G/DL (ref 30–36.5)
MCV RBC AUTO: 94.3 FL (ref 80–99)
MONOCYTES # BLD: 0.6 K/UL (ref 0–1)
MONOCYTES NFR BLD: 7 % (ref 5–13)
NEUTS SEG # BLD: 6.4 K/UL (ref 1.8–8)
NEUTS SEG NFR BLD: 67 % (ref 32–75)
NRBC # BLD: 0 K/UL (ref 0–0.01)
NRBC BLD-RTO: 0 PER 100 WBC
PLATELET # BLD AUTO: 439 K/UL (ref 150–400)
PMV BLD AUTO: 8.9 FL (ref 8.9–12.9)
POTASSIUM SERPL-SCNC: 4.7 MMOL/L (ref 3.5–5.1)
PROT SERPL-MCNC: 8.2 G/DL (ref 6.4–8.2)
RBC # BLD AUTO: 4.02 M/UL (ref 3.8–5.2)
SODIUM SERPL-SCNC: 138 MMOL/L (ref 136–145)
WBC # BLD AUTO: 9.4 K/UL (ref 3.6–11)

## 2023-12-02 PROCEDURE — 36415 COLL VENOUS BLD VENIPUNCTURE: CPT

## 2023-12-02 PROCEDURE — 99283 EMERGENCY DEPT VISIT LOW MDM: CPT

## 2023-12-02 PROCEDURE — 80053 COMPREHEN METABOLIC PANEL: CPT

## 2023-12-02 PROCEDURE — 85025 COMPLETE CBC W/AUTO DIFF WBC: CPT

## 2023-12-02 RX ORDER — CEPHALEXIN 500 MG/1
500 CAPSULE ORAL 2 TIMES DAILY
Qty: 14 CAPSULE | Refills: 0 | Status: SHIPPED | OUTPATIENT
Start: 2023-12-02 | End: 2023-12-09

## 2023-12-13 ENCOUNTER — OFFICE VISIT (OUTPATIENT)
Age: 63
End: 2023-12-13

## 2023-12-13 VITALS
TEMPERATURE: 97.6 F | BODY MASS INDEX: 19.16 KG/M2 | RESPIRATION RATE: 20 BRPM | DIASTOLIC BLOOD PRESSURE: 77 MMHG | HEART RATE: 85 BPM | SYSTOLIC BLOOD PRESSURE: 138 MMHG | WEIGHT: 97.6 LBS | OXYGEN SATURATION: 96 % | HEIGHT: 60 IN

## 2023-12-13 DIAGNOSIS — Z09 POSTOP CHECK: Primary | ICD-10-CM

## 2023-12-13 PROCEDURE — 99024 POSTOP FOLLOW-UP VISIT: CPT | Performed by: STUDENT IN AN ORGANIZED HEALTH CARE EDUCATION/TRAINING PROGRAM

## 2023-12-13 NOTE — PROGRESS NOTES
Identified pt with two pt identifiers(name and ). Reviewed record in preparation for visit and have obtained necessary documentation. All patient medications has been reviewed. Chief Complaint   Patient presents with    Post-Op Check     S/P  Adhesiolysis. LAST SEEN             Health Maintenance Due   Topic    COVID-19 Vaccine (1)    Pneumococcal 0-64 years Vaccine (1 - PCV)    Diabetic foot exam     Lipids     HIV screen     Diabetic Alb to Cr ratio (uACR) test     Diabetic retinal exam     Hepatitis C screen     DTaP/Tdap/Td vaccine (1 - Tdap)    Colorectal Cancer Screen     Breast cancer screen     Shingles vaccine (1 of 2)    Hepatitis B vaccine (1 of 3 - Risk 3-dose series)    Respiratory Syncytial Virus (RSV) age 61 yrs+ (1 - 1-dose 60+ series)    Flu vaccine (1)       [unfilled]    4. Have you been to the ER, urgent care clinic since your last visit? Hospitalized since your last visit? yes    5. Have you seen or consulted any other health care providers outside of the [address] [place] [address] Avenue since your last visit? Include any pap smears or colon screening. no      Patient is accompanied by spouse I have received verbal consent from Juan Lester to discuss any/all medical information while they are present in the room.

## 2023-12-13 NOTE — PROGRESS NOTES
Surgery Progress    58year-old patient 7 weeks out from ex lap, lysis of adhesions for SBO. Presents today with a chief complaint of oozing from incision. She was last seen in the ER for this about 10 days ago. On exam, she has almost 0 oozing. With some manipulation I was able to get 1 drop of serous fluid on a gauze. No seroma. Old, crusted Dermabond was removed. Incision is well-healed. With some pressure she will have 1 or 2 drops of serous fluid. Multiple other complaints regarding various locations of pain. Denies any obstructive symptoms. Has been tolerating solid food. No emesis. The patient is well-known to us and at baseline has numerous complaints. Assessment: Overall she is recovering perfectly as expected from surgery. We did have a long conversation regarding expectations of healing, emphasizing that scars can take up to 1 year to mature and that it is not uncommon to have some serous drainage for several weeks after surgery. Follow-up as needed.        Consuelo Morin MD  General Surgery

## 2024-01-31 ENCOUNTER — HOSPITAL ENCOUNTER (EMERGENCY)
Facility: HOSPITAL | Age: 64
Discharge: LEFT BEFORE DISCHARGE | End: 2024-01-31

## 2024-01-31 VITALS
RESPIRATION RATE: 18 BRPM | DIASTOLIC BLOOD PRESSURE: 84 MMHG | WEIGHT: 92.81 LBS | TEMPERATURE: 98.1 F | BODY MASS INDEX: 18.71 KG/M2 | HEIGHT: 59 IN | OXYGEN SATURATION: 98 % | HEART RATE: 78 BPM | SYSTOLIC BLOOD PRESSURE: 151 MMHG

## 2024-01-31 ASSESSMENT — PAIN SCALES - GENERAL: PAINLEVEL_OUTOF10: 9

## 2024-01-31 ASSESSMENT — PAIN - FUNCTIONAL ASSESSMENT: PAIN_FUNCTIONAL_ASSESSMENT: 0-10

## 2024-01-31 ASSESSMENT — PAIN DESCRIPTION - ORIENTATION: ORIENTATION: LOWER;RIGHT

## 2024-01-31 ASSESSMENT — PAIN DESCRIPTION - LOCATION: LOCATION: ABDOMEN

## 2024-01-31 NOTE — ED TRIAGE NOTES
Explained plan of care to pt and family member. Advised that RN would order blood work/IV in order to start work up now to ensure labs were resulted when pt was seen by provider. Pt and family member state they do not feel that blood work and an IV are neccessary \"and just needs to determine if she has a hernia\". Explained to pt and family member that an IV and lab work may be needed should the provider order any imaging. Pt reports the last time she was seen in the ED, she was given an IV and it wasn't even needed and she required several IV sticks and US IV at that time. Advised pt that it was her choice to decline IV/lab work at this time and she could wait until she was evaluated by provider. Pt stated she would like to wait and be seen by provider.

## 2024-02-01 ENCOUNTER — APPOINTMENT (OUTPATIENT)
Facility: HOSPITAL | Age: 64
End: 2024-02-01
Payer: COMMERCIAL

## 2024-02-01 ENCOUNTER — HOSPITAL ENCOUNTER (EMERGENCY)
Facility: HOSPITAL | Age: 64
Discharge: HOME | End: 2024-02-01
Attending: STUDENT IN AN ORGANIZED HEALTH CARE EDUCATION/TRAINING PROGRAM
Payer: COMMERCIAL

## 2024-02-01 VITALS
BODY MASS INDEX: 18.06 KG/M2 | HEIGHT: 60 IN | WEIGHT: 92 LBS | RESPIRATION RATE: 20 BRPM | DIASTOLIC BLOOD PRESSURE: 75 MMHG | SYSTOLIC BLOOD PRESSURE: 171 MMHG | HEART RATE: 76 BPM | OXYGEN SATURATION: 100 %

## 2024-02-01 DIAGNOSIS — M79.651 RIGHT THIGH PAIN: Primary | ICD-10-CM

## 2024-02-01 DIAGNOSIS — R10.31 ABDOMINAL WALL PAIN IN RIGHT LOWER QUADRANT: ICD-10-CM

## 2024-02-01 LAB
ALBUMIN SERPL-MCNC: 3.7 G/DL (ref 3.5–5)
ALBUMIN/GLOB SERPL: 0.9 (ref 1.1–2.2)
ALP SERPL-CCNC: 47 U/L (ref 45–117)
ALT SERPL-CCNC: 18 U/L (ref 12–78)
ANION GAP SERPL CALC-SCNC: 6 MMOL/L (ref 5–15)
AST SERPL-CCNC: 10 U/L (ref 15–37)
BASOPHILS # BLD: 0.1 K/UL (ref 0–0.1)
BASOPHILS NFR BLD: 1 % (ref 0–1)
BILIRUB SERPL-MCNC: 0.4 MG/DL (ref 0.2–1)
BUN SERPL-MCNC: 14 MG/DL (ref 6–20)
BUN/CREAT SERPL: 22 (ref 12–20)
CALCIUM SERPL-MCNC: 9.7 MG/DL (ref 8.5–10.1)
CHLORIDE SERPL-SCNC: 106 MMOL/L (ref 97–108)
CO2 SERPL-SCNC: 26 MMOL/L (ref 21–32)
CREAT SERPL-MCNC: 0.64 MG/DL (ref 0.55–1.02)
DIFFERENTIAL METHOD BLD: ABNORMAL
EOSINOPHIL # BLD: 0 K/UL (ref 0–0.4)
EOSINOPHIL NFR BLD: 0 % (ref 0–7)
ERYTHROCYTE [DISTWIDTH] IN BLOOD BY AUTOMATED COUNT: 12.9 % (ref 11.5–14.5)
GLOBULIN SER CALC-MCNC: 4.3 G/DL (ref 2–4)
GLUCOSE SERPL-MCNC: 98 MG/DL (ref 65–100)
HCT VFR BLD AUTO: 37.4 % (ref 35–47)
HGB BLD-MCNC: 12.4 G/DL (ref 11.5–16)
IMM GRANULOCYTES # BLD AUTO: 0 K/UL (ref 0–0.04)
IMM GRANULOCYTES NFR BLD AUTO: 0 % (ref 0–0.5)
LIPASE SERPL-CCNC: 32 U/L (ref 13–75)
LYMPHOCYTES # BLD: 1.5 K/UL (ref 0.8–3.5)
LYMPHOCYTES NFR BLD: 16 % (ref 12–49)
MAGNESIUM SERPL-MCNC: 2.1 MG/DL (ref 1.6–2.4)
MCH RBC QN AUTO: 30.1 PG (ref 26–34)
MCHC RBC AUTO-ENTMCNC: 33.2 G/DL (ref 30–36.5)
MCV RBC AUTO: 90.8 FL (ref 80–99)
MONOCYTES # BLD: 0.7 K/UL (ref 0–1)
MONOCYTES NFR BLD: 7 % (ref 5–13)
NEUTS SEG # BLD: 7.1 K/UL (ref 1.8–8)
NEUTS SEG NFR BLD: 76 % (ref 32–75)
NRBC # BLD: 0 K/UL (ref 0–0.01)
NRBC BLD-RTO: 0 PER 100 WBC
PLATELET # BLD AUTO: 341 K/UL (ref 150–400)
PMV BLD AUTO: 8.9 FL (ref 8.9–12.9)
POTASSIUM SERPL-SCNC: 3.7 MMOL/L (ref 3.5–5.1)
PROT SERPL-MCNC: 8 G/DL (ref 6.4–8.2)
RBC # BLD AUTO: 4.12 M/UL (ref 3.8–5.2)
SODIUM SERPL-SCNC: 138 MMOL/L (ref 136–145)
WBC # BLD AUTO: 9.4 K/UL (ref 3.6–11)

## 2024-02-01 PROCEDURE — 6360000004 HC RX CONTRAST MEDICATION: Performed by: STUDENT IN AN ORGANIZED HEALTH CARE EDUCATION/TRAINING PROGRAM

## 2024-02-01 PROCEDURE — 99285 EMERGENCY DEPT VISIT HI MDM: CPT

## 2024-02-01 PROCEDURE — 85025 COMPLETE CBC W/AUTO DIFF WBC: CPT

## 2024-02-01 PROCEDURE — 96374 THER/PROPH/DIAG INJ IV PUSH: CPT

## 2024-02-01 PROCEDURE — 83735 ASSAY OF MAGNESIUM: CPT

## 2024-02-01 PROCEDURE — 36415 COLL VENOUS BLD VENIPUNCTURE: CPT

## 2024-02-01 PROCEDURE — 74177 CT ABD & PELVIS W/CONTRAST: CPT

## 2024-02-01 PROCEDURE — 83690 ASSAY OF LIPASE: CPT

## 2024-02-01 PROCEDURE — 6370000000 HC RX 637 (ALT 250 FOR IP): Performed by: STUDENT IN AN ORGANIZED HEALTH CARE EDUCATION/TRAINING PROGRAM

## 2024-02-01 PROCEDURE — 6360000002 HC RX W HCPCS: Performed by: STUDENT IN AN ORGANIZED HEALTH CARE EDUCATION/TRAINING PROGRAM

## 2024-02-01 PROCEDURE — 80053 COMPREHEN METABOLIC PANEL: CPT

## 2024-02-01 RX ORDER — DIAZEPAM 5 MG/1
5 TABLET ORAL EVERY 6 HOURS PRN
Status: DISCONTINUED | OUTPATIENT
Start: 2024-02-01 | End: 2024-02-01 | Stop reason: HOSPADM

## 2024-02-01 RX ORDER — METHOCARBAMOL 750 MG/1
750 TABLET, FILM COATED ORAL 4 TIMES DAILY
Qty: 80 TABLET | Refills: 0 | Status: SHIPPED | OUTPATIENT
Start: 2024-02-01 | End: 2024-02-21

## 2024-02-01 RX ORDER — IBUPROFEN 600 MG/1
600 TABLET ORAL EVERY 6 HOURS PRN
Qty: 50 TABLET | Refills: 1 | Status: SHIPPED | OUTPATIENT
Start: 2024-02-01

## 2024-02-01 RX ORDER — LIDOCAINE 4 G/G
1 PATCH TOPICAL
Status: DISCONTINUED | OUTPATIENT
Start: 2024-02-01 | End: 2024-02-01 | Stop reason: HOSPADM

## 2024-02-01 RX ORDER — KETOROLAC TROMETHAMINE 30 MG/ML
15 INJECTION, SOLUTION INTRAMUSCULAR; INTRAVENOUS
Status: COMPLETED | OUTPATIENT
Start: 2024-02-01 | End: 2024-02-01

## 2024-02-01 RX ORDER — LIDOCAINE 4 G/G
1 PATCH TOPICAL DAILY
Qty: 30 PATCH | Refills: 0 | Status: SHIPPED | OUTPATIENT
Start: 2024-02-01 | End: 2024-03-02

## 2024-02-01 RX ADMIN — IOHEXOL 50 ML: 240 INJECTION, SOLUTION INTRATHECAL; INTRAVASCULAR; INTRAVENOUS; ORAL at 09:59

## 2024-02-01 RX ADMIN — KETOROLAC TROMETHAMINE 15 MG: 30 INJECTION INTRAMUSCULAR; INTRAVENOUS at 09:57

## 2024-02-01 RX ADMIN — IOPAMIDOL 100 ML: 755 INJECTION, SOLUTION INTRAVENOUS at 11:10

## 2024-02-01 RX ADMIN — DIAZEPAM 5 MG: 5 TABLET ORAL at 09:57

## 2024-02-01 ASSESSMENT — PAIN - FUNCTIONAL ASSESSMENT: PAIN_FUNCTIONAL_ASSESSMENT: 0-10

## 2024-02-01 ASSESSMENT — PAIN DESCRIPTION - LOCATION: LOCATION: ABDOMEN;GROIN

## 2024-02-01 ASSESSMENT — PAIN SCALES - GENERAL: PAINLEVEL_OUTOF10: 10

## 2024-02-02 NOTE — ED PROVIDER NOTES
[facility] EMERGENCY DEPT  EMERGENCY DEPARTMENT ENCOUNTER       Pt Name: Shameka Beltran  MRN: [id number]  Birthdate 1960  Date of evaluation: 2/1/2024  Provider: Clarke Hodges MD   PCP: Jaguar Beltran MD  Note Started: 11:44 PM EST 2/1/24     CHIEF COMPLAINT       Chief Complaint   Patient presents with    Abdominal Pain     Pt ambulatory into ed room with reports of R sided abdominal and groin pain that radiates into R leg. Pt states \"I can hardly move my leg\". Pt had \"open bowel surgery\" 3 months ago.         HISTORY OF PRESENT ILLNESS: 1 or more elements      History From: Patient  HPI Limitations: None     Shameka Beltran is a 63 y.o. female who presents for evaluation of right lower quadrant abdominal pain.  Patient is having pain just over her right lower abdomen over the bony part of her pelvis.  No injuries.  She reports the pain radiates down to her right thigh just above the knee.  No rashes.  No swelling.  She reports pain with movement.  No pain to her back or her hip.  She is concerned because she has had a small bowel obstruction requiring surgery.  She is very concerned that she may have an internal hernia versus another obstruction.  She denies nausea, vomiting, diarrhea, constipation, fever, chills, abdominal bloating or distention.  Denies urinary symptoms.  Denies recent illnesses.  She denies chest pain, shortness of breath.  Takes medications daily for spasm to prevent this.  No change to her medications recently.         Nursing Notes were all reviewed and agreed with or any disagreements were addressed in the HPI.     REVIEW OF SYSTEMS      Review of Systems     Positives and Pertinent negatives as per HPI.    PAST HISTORY     Past Medical History:  Past Medical History:   Diagnosis Date    Salivary gland stone     Sleep difficulties     Small bowel obstruction (HCC) 3/30/2014    Stiff person syndrome 2010    Dr. Mason Grande neurologist         Past Surgical History:  Past Surgical History:

## 2024-02-05 ENCOUNTER — OFFICE VISIT (OUTPATIENT)
Age: 64
End: 2024-02-05
Payer: COMMERCIAL

## 2024-02-05 VITALS
HEART RATE: 82 BPM | TEMPERATURE: 97.6 F | SYSTOLIC BLOOD PRESSURE: 133 MMHG | BODY MASS INDEX: 18.65 KG/M2 | OXYGEN SATURATION: 99 % | RESPIRATION RATE: 17 BRPM | DIASTOLIC BLOOD PRESSURE: 80 MMHG | WEIGHT: 95 LBS | HEIGHT: 60 IN

## 2024-02-05 DIAGNOSIS — M50.90 CERVICAL NECK PAIN WITH EVIDENCE OF DISC DISEASE: ICD-10-CM

## 2024-02-05 DIAGNOSIS — I67.89 CEREBRAL MICROVASCULAR DISEASE: ICD-10-CM

## 2024-02-05 DIAGNOSIS — I65.23 BILATERAL CAROTID ARTERY STENOSIS: ICD-10-CM

## 2024-02-05 DIAGNOSIS — G25.82 STIFF-MAN SYNDROME: ICD-10-CM

## 2024-02-05 DIAGNOSIS — R41.3 MEMORY LOSS: ICD-10-CM

## 2024-02-05 DIAGNOSIS — R53.1 WEAKNESS GENERALIZED: ICD-10-CM

## 2024-02-05 DIAGNOSIS — H53.9 UNSPECIFIED VISUAL DISTURBANCE: ICD-10-CM

## 2024-02-05 DIAGNOSIS — E06.3 HASHIMOTO'S DISEASE: Primary | ICD-10-CM

## 2024-02-05 PROCEDURE — 99214 OFFICE O/P EST MOD 30 MIN: CPT | Performed by: PSYCHIATRY & NEUROLOGY

## 2024-02-05 ASSESSMENT — PATIENT HEALTH QUESTIONNAIRE - PHQ9
1. LITTLE INTEREST OR PLEASURE IN DOING THINGS: 0
SUM OF ALL RESPONSES TO PHQ QUESTIONS 1-9: 0
SUM OF ALL RESPONSES TO PHQ QUESTIONS 1-9: 0
SUM OF ALL RESPONSES TO PHQ9 QUESTIONS 1 & 2: 0
2. FEELING DOWN, DEPRESSED OR HOPELESS: 0
SUM OF ALL RESPONSES TO PHQ QUESTIONS 1-9: 0
SUM OF ALL RESPONSES TO PHQ QUESTIONS 1-9: 0

## 2024-02-05 NOTE — TELEPHONE ENCOUNTER
Pt forgot to ask Dr. Bansal to call in Tylenol 3 to [facility] pharmacy. Please call to advise pt that this is done.

## 2024-02-06 NOTE — PROGRESS NOTES
Consult  REFERRED BY:  Jaguar Beltran MD    CHIEF COMPLAINT: Patient seen for new problem of severe pain in the right side of her abdomen that radiated into the anterior right thigh for which she went to the emergency room 4 days ago and had a negative workup with a CT of the abdomen that was unremarkable, after recent intestinal surgery for obstruction and for double vision that began about a year ago when she was hospitalized in August 2022 at [facility].      Subjective:     Shameka Beltran is a 63 y.o. right-handed  female, we are seeing at the request of Dr. Beltran, on urgent work in basis because of the patient hospitalized 4 days ago for severe abdominal pain, that seems to radiate into her right leg and for which she is seems to have had some difficulty using her right leg for unclear reason, because the strength of the leg was the same and her CT of the abdomen after her abdominal surgery for obstruction showed no new lesions.  On her evaluation today, she really does not have any new focal weakness or reflex asymmetry does not have any back pain, and the pain seems to be more from the abdomen down, and is possible she has some type of postop nerve entrapment of the ilioinguinal nerve or something, but she did not have the pain until 4 days ago and her surgery was done several months ago.  She also complains of generalized weakness, and just cannot seem to recover from the surgery much, and advised her that sometimes it just takes time, but she has no other evidence of neuromuscular disease.  We had seen her several months ago for some intermittent double vision, for which she was hospitalized at [place] and nothing was ever found despite an extensive evaluation, and she was found to have an normal EMG and single-fiber EMG by Dr. Davis last summer which also without any myasthenia and all her myasthenia titers are negative and her neuro-ophthalmologist never could find any

## 2024-02-08 RX ORDER — ACETAMINOPHEN AND CODEINE PHOSPHATE 300; 30 MG/1; MG/1
1 TABLET ORAL
Qty: 18 TABLET | Refills: 0 | OUTPATIENT
Start: 2024-02-08 | End: 2024-02-11

## 2024-05-22 DIAGNOSIS — G25.82 STIFF-MAN SYNDROME: ICD-10-CM

## 2024-05-22 RX ORDER — DIAZEPAM 5 MG/1
TABLET ORAL
Qty: 90 TABLET | Refills: 5 | Status: ON HOLD | OUTPATIENT
Start: 2024-05-22 | End: 2024-11-22

## 2024-05-22 RX ORDER — DIAZEPAM 5 MG/1
TABLET ORAL
Qty: 90 TABLET | Refills: 5 | OUTPATIENT
Start: 2024-05-22 | End: 2024-11-21

## 2024-05-22 NOTE — TELEPHONE ENCOUNTER
Patient called to request to refill for diazePAM (VALIUM) 5 MG tablet  . Please send to [facility]. Pt can be reached at [phone]

## 2024-05-26 ENCOUNTER — APPOINTMENT (OUTPATIENT)
Facility: HOSPITAL | Age: 64
DRG: 390 | End: 2024-05-26
Payer: COMMERCIAL

## 2024-05-26 ENCOUNTER — HOSPITAL ENCOUNTER (INPATIENT)
Facility: HOSPITAL | Age: 64
LOS: 2 days | Discharge: HOME | DRG: 390 | End: 2024-05-28
Attending: EMERGENCY MEDICINE | Admitting: SURGERY
Payer: COMMERCIAL

## 2024-05-26 DIAGNOSIS — K56.609 SBO (SMALL BOWEL OBSTRUCTION) (HCC): Primary | ICD-10-CM

## 2024-05-26 LAB
ALBUMIN SERPL-MCNC: 3.8 G/DL (ref 3.5–5)
ALBUMIN/GLOB SERPL: 1 (ref 1.1–2.2)
ALP SERPL-CCNC: 70 U/L (ref 45–117)
ALT SERPL-CCNC: 23 U/L (ref 12–78)
ANION GAP SERPL CALC-SCNC: 8 MMOL/L (ref 5–15)
AST SERPL-CCNC: 18 U/L (ref 15–37)
BASOPHILS # BLD: 0.1 K/UL (ref 0–0.1)
BASOPHILS NFR BLD: 1 % (ref 0–1)
BILIRUB SERPL-MCNC: 0.3 MG/DL (ref 0.2–1)
BUN SERPL-MCNC: 20 MG/DL (ref 6–20)
BUN/CREAT SERPL: 20 (ref 12–20)
CALCIUM SERPL-MCNC: 9.4 MG/DL (ref 8.5–10.1)
CHLORIDE SERPL-SCNC: 105 MMOL/L (ref 97–108)
CO2 SERPL-SCNC: 23 MMOL/L (ref 21–32)
CREAT SERPL-MCNC: 1.01 MG/DL (ref 0.55–1.02)
DIFFERENTIAL METHOD BLD: NORMAL
EOSINOPHIL # BLD: 0.1 K/UL (ref 0–0.4)
EOSINOPHIL NFR BLD: 1 % (ref 0–7)
ERYTHROCYTE [DISTWIDTH] IN BLOOD BY AUTOMATED COUNT: 13.5 % (ref 11.5–14.5)
GLOBULIN SER CALC-MCNC: 3.9 G/DL (ref 2–4)
GLUCOSE SERPL-MCNC: 110 MG/DL (ref 65–100)
HCT VFR BLD AUTO: 38.3 % (ref 35–47)
HGB BLD-MCNC: 13.5 G/DL (ref 11.5–16)
IMM GRANULOCYTES # BLD AUTO: 0 K/UL (ref 0–0.04)
IMM GRANULOCYTES NFR BLD AUTO: 0 % (ref 0–0.5)
LIPASE SERPL-CCNC: 43 U/L (ref 13–75)
LYMPHOCYTES # BLD: 3.4 K/UL (ref 0.8–3.5)
LYMPHOCYTES NFR BLD: 34 % (ref 12–49)
MCH RBC QN AUTO: 32.6 PG (ref 26–34)
MCHC RBC AUTO-ENTMCNC: 35.2 G/DL (ref 30–36.5)
MCV RBC AUTO: 92.5 FL (ref 80–99)
MONOCYTES # BLD: 0.6 K/UL (ref 0–1)
MONOCYTES NFR BLD: 6 % (ref 5–13)
NEUTS SEG # BLD: 5.6 K/UL (ref 1.8–8)
NEUTS SEG NFR BLD: 58 % (ref 32–75)
NRBC # BLD: 0 K/UL (ref 0–0.01)
NRBC BLD-RTO: 0 PER 100 WBC
PLATELET # BLD AUTO: 334 K/UL (ref 150–400)
PMV BLD AUTO: 8.9 FL (ref 8.9–12.9)
POTASSIUM SERPL-SCNC: 4 MMOL/L (ref 3.5–5.1)
PROT SERPL-MCNC: 7.7 G/DL (ref 6.4–8.2)
RBC # BLD AUTO: 4.14 M/UL (ref 3.8–5.2)
SODIUM SERPL-SCNC: 136 MMOL/L (ref 136–145)
WBC # BLD AUTO: 9.8 K/UL (ref 3.6–11)

## 2024-05-26 PROCEDURE — 80053 COMPREHEN METABOLIC PANEL: CPT

## 2024-05-26 PROCEDURE — 36415 COLL VENOUS BLD VENIPUNCTURE: CPT

## 2024-05-26 PROCEDURE — 6360000002 HC RX W HCPCS: Performed by: EMERGENCY MEDICINE

## 2024-05-26 PROCEDURE — 6360000004 HC RX CONTRAST MEDICATION: Performed by: EMERGENCY MEDICINE

## 2024-05-26 PROCEDURE — 96374 THER/PROPH/DIAG INJ IV PUSH: CPT

## 2024-05-26 PROCEDURE — 2580000003 HC RX 258: Performed by: EMERGENCY MEDICINE

## 2024-05-26 PROCEDURE — 96375 TX/PRO/DX INJ NEW DRUG ADDON: CPT

## 2024-05-26 PROCEDURE — 74177 CT ABD & PELVIS W/CONTRAST: CPT

## 2024-05-26 PROCEDURE — 99254 IP/OBS CNSLTJ NEW/EST MOD 60: CPT | Performed by: SURGERY

## 2024-05-26 PROCEDURE — 71045 X-RAY EXAM CHEST 1 VIEW: CPT

## 2024-05-26 PROCEDURE — 85025 COMPLETE CBC W/AUTO DIFF WBC: CPT

## 2024-05-26 PROCEDURE — 83690 ASSAY OF LIPASE: CPT

## 2024-05-26 PROCEDURE — 1100000000 HC RM PRIVATE

## 2024-05-26 PROCEDURE — 6370000000 HC RX 637 (ALT 250 FOR IP): Performed by: SURGERY

## 2024-05-26 PROCEDURE — 99285 EMERGENCY DEPT VISIT HI MDM: CPT

## 2024-05-26 PROCEDURE — 6370000000 HC RX 637 (ALT 250 FOR IP): Performed by: EMERGENCY MEDICINE

## 2024-05-26 PROCEDURE — 2500000003 HC RX 250 WO HCPCS: Performed by: SURGERY

## 2024-05-26 PROCEDURE — 2580000003 HC RX 258: Performed by: SURGERY

## 2024-05-26 RX ORDER — LORAZEPAM 2 MG/ML
1 INJECTION INTRAMUSCULAR ONCE
Status: COMPLETED | OUTPATIENT
Start: 2024-05-26 | End: 2024-05-26

## 2024-05-26 RX ORDER — HYDROMORPHONE HYDROCHLORIDE 1 MG/ML
0.5 INJECTION, SOLUTION INTRAMUSCULAR; INTRAVENOUS; SUBCUTANEOUS
Status: DISCONTINUED | OUTPATIENT
Start: 2024-05-26 | End: 2024-05-28 | Stop reason: HOSPADM

## 2024-05-26 RX ORDER — DEXTROSE MONOHYDRATE, SODIUM CHLORIDE, AND POTASSIUM CHLORIDE 50; 1.49; 4.5 G/1000ML; G/1000ML; G/1000ML
INJECTION, SOLUTION INTRAVENOUS CONTINUOUS
Status: DISCONTINUED | OUTPATIENT
Start: 2024-05-26 | End: 2024-05-28 | Stop reason: HOSPADM

## 2024-05-26 RX ORDER — LIDOCAINE HYDROCHLORIDE 20 MG/ML
15 SOLUTION OROPHARYNGEAL
Status: COMPLETED | OUTPATIENT
Start: 2024-05-26 | End: 2024-05-26

## 2024-05-26 RX ORDER — DIAZEPAM 5 MG/ML
2.5 INJECTION, SOLUTION INTRAMUSCULAR; INTRAVENOUS ONCE
Status: COMPLETED | OUTPATIENT
Start: 2024-05-26 | End: 2024-05-26

## 2024-05-26 RX ORDER — ONDANSETRON 2 MG/ML
4 INJECTION INTRAMUSCULAR; INTRAVENOUS ONCE
Status: COMPLETED | OUTPATIENT
Start: 2024-05-26 | End: 2024-05-26

## 2024-05-26 RX ORDER — ONDANSETRON 4 MG/1
4 TABLET, ORALLY DISINTEGRATING ORAL EVERY 8 HOURS PRN
Status: DISCONTINUED | OUTPATIENT
Start: 2024-05-26 | End: 2024-05-28 | Stop reason: HOSPADM

## 2024-05-26 RX ORDER — 0.9 % SODIUM CHLORIDE 0.9 %
500 INTRAVENOUS SOLUTION INTRAVENOUS ONCE
Status: COMPLETED | OUTPATIENT
Start: 2024-05-26 | End: 2024-05-26

## 2024-05-26 RX ORDER — SODIUM CHLORIDE 0.9 % (FLUSH) 0.9 %
5-40 SYRINGE (ML) INJECTION EVERY 12 HOURS SCHEDULED
Status: DISCONTINUED | OUTPATIENT
Start: 2024-05-26 | End: 2024-05-28 | Stop reason: HOSPADM

## 2024-05-26 RX ORDER — MAGNESIUM SULFATE IN WATER 40 MG/ML
2000 INJECTION, SOLUTION INTRAVENOUS PRN
Status: DISCONTINUED | OUTPATIENT
Start: 2024-05-26 | End: 2024-05-28 | Stop reason: HOSPADM

## 2024-05-26 RX ORDER — SODIUM CHLORIDE 0.9 % (FLUSH) 0.9 %
5-40 SYRINGE (ML) INJECTION PRN
Status: DISCONTINUED | OUTPATIENT
Start: 2024-05-26 | End: 2024-05-28 | Stop reason: HOSPADM

## 2024-05-26 RX ORDER — POTASSIUM CHLORIDE 29.8 MG/ML
20 INJECTION INTRAVENOUS PRN
Status: DISCONTINUED | OUTPATIENT
Start: 2024-05-26 | End: 2024-05-28 | Stop reason: HOSPADM

## 2024-05-26 RX ORDER — ONDANSETRON 2 MG/ML
4 INJECTION INTRAMUSCULAR; INTRAVENOUS ONCE
Status: DISCONTINUED | OUTPATIENT
Start: 2024-05-26 | End: 2024-05-26

## 2024-05-26 RX ORDER — POTASSIUM CHLORIDE 7.45 MG/ML
10 INJECTION INTRAVENOUS PRN
Status: DISCONTINUED | OUTPATIENT
Start: 2024-05-26 | End: 2024-05-28 | Stop reason: HOSPADM

## 2024-05-26 RX ORDER — FENTANYL CITRATE 50 UG/ML
100 INJECTION, SOLUTION INTRAMUSCULAR; INTRAVENOUS
Status: COMPLETED | OUTPATIENT
Start: 2024-05-26 | End: 2024-05-26

## 2024-05-26 RX ORDER — ACETAMINOPHEN 325 MG/1
650 TABLET ORAL EVERY 4 HOURS PRN
Status: DISCONTINUED | OUTPATIENT
Start: 2024-05-26 | End: 2024-05-28 | Stop reason: HOSPADM

## 2024-05-26 RX ORDER — SODIUM CHLORIDE 9 MG/ML
INJECTION, SOLUTION INTRAVENOUS PRN
Status: DISCONTINUED | OUTPATIENT
Start: 2024-05-26 | End: 2024-05-28 | Stop reason: HOSPADM

## 2024-05-26 RX ORDER — ONDANSETRON 2 MG/ML
4 INJECTION INTRAMUSCULAR; INTRAVENOUS EVERY 6 HOURS PRN
Status: DISCONTINUED | OUTPATIENT
Start: 2024-05-26 | End: 2024-05-28 | Stop reason: HOSPADM

## 2024-05-26 RX ADMIN — POTASSIUM CHLORIDE, DEXTROSE MONOHYDRATE AND SODIUM CHLORIDE: 150; 5; 450 INJECTION, SOLUTION INTRAVENOUS at 21:30

## 2024-05-26 RX ADMIN — SODIUM CHLORIDE 500 ML: 9 INJECTION, SOLUTION INTRAVENOUS at 03:56

## 2024-05-26 RX ADMIN — DIATRIZOATE MEGLUMINE AND DIATRIZOATE SODIUM 30 ML: 660; 100 LIQUID ORAL; RECTAL at 04:01

## 2024-05-26 RX ADMIN — SODIUM CHLORIDE, PRESERVATIVE FREE 10 ML: 5 INJECTION INTRAVENOUS at 10:27

## 2024-05-26 RX ADMIN — LORAZEPAM 1 MG: 2 INJECTION INTRAMUSCULAR; INTRAVENOUS at 05:46

## 2024-05-26 RX ADMIN — HYDROMORPHONE HYDROCHLORIDE 0.5 MG: 1 INJECTION, SOLUTION INTRAMUSCULAR; INTRAVENOUS; SUBCUTANEOUS at 17:16

## 2024-05-26 RX ADMIN — HYDROMORPHONE HYDROCHLORIDE 0.5 MG: 1 INJECTION, SOLUTION INTRAMUSCULAR; INTRAVENOUS; SUBCUTANEOUS at 10:27

## 2024-05-26 RX ADMIN — BENZOCAINE, BUTAMBEN, AND TETRACAINE HYDROCHLORIDE 1 SPRAY: .028; .004; .004 AEROSOL, SPRAY TOPICAL at 05:27

## 2024-05-26 RX ADMIN — FENTANYL CITRATE 100 MCG: 50 INJECTION INTRAMUSCULAR; INTRAVENOUS at 03:57

## 2024-05-26 RX ADMIN — POTASSIUM CHLORIDE, DEXTROSE MONOHYDRATE AND SODIUM CHLORIDE: 150; 5; 450 INJECTION, SOLUTION INTRAVENOUS at 13:52

## 2024-05-26 RX ADMIN — LIDOCAINE HYDROCHLORIDE 15 ML: 20 SOLUTION ORAL at 05:27

## 2024-05-26 RX ADMIN — POTASSIUM CHLORIDE, DEXTROSE MONOHYDRATE AND SODIUM CHLORIDE: 150; 5; 450 INJECTION, SOLUTION INTRAVENOUS at 05:49

## 2024-05-26 RX ADMIN — SODIUM CHLORIDE, PRESERVATIVE FREE 10 ML: 5 INJECTION INTRAVENOUS at 13:55

## 2024-05-26 RX ADMIN — HYDROMORPHONE HYDROCHLORIDE 0.5 MG: 1 INJECTION, SOLUTION INTRAMUSCULAR; INTRAVENOUS; SUBCUTANEOUS at 06:55

## 2024-05-26 RX ADMIN — SODIUM CHLORIDE, PRESERVATIVE FREE 10 ML: 5 INJECTION INTRAVENOUS at 08:57

## 2024-05-26 RX ADMIN — DIAZEPAM 2.5 MG: 5 INJECTION, SOLUTION INTRAMUSCULAR; INTRAVENOUS at 04:44

## 2024-05-26 RX ADMIN — HYDROMORPHONE HYDROCHLORIDE 0.5 MG: 1 INJECTION, SOLUTION INTRAMUSCULAR; INTRAVENOUS; SUBCUTANEOUS at 13:52

## 2024-05-26 RX ADMIN — IOPAMIDOL 100 ML: 755 INJECTION, SOLUTION INTRAVENOUS at 05:03

## 2024-05-26 RX ADMIN — HYDROMORPHONE HYDROCHLORIDE 0.5 MG: 1 INJECTION, SOLUTION INTRAMUSCULAR; INTRAVENOUS; SUBCUTANEOUS at 21:20

## 2024-05-26 RX ADMIN — PHENOL 1 SPRAY: 1.5 LIQUID ORAL at 17:07

## 2024-05-26 RX ADMIN — ONDANSETRON 4 MG: 2 INJECTION INTRAMUSCULAR; INTRAVENOUS at 03:57

## 2024-05-26 ASSESSMENT — PAIN SCALES - GENERAL
PAINLEVEL_OUTOF10: 4
PAINLEVEL_OUTOF10: 8
PAINLEVEL_OUTOF10: 4
PAINLEVEL_OUTOF10: 9
PAINLEVEL_OUTOF10: 8
PAINLEVEL_OUTOF10: 3

## 2024-05-26 ASSESSMENT — PAIN DESCRIPTION - DESCRIPTORS
DESCRIPTORS: ACHING

## 2024-05-26 ASSESSMENT — PAIN DESCRIPTION - LOCATION
LOCATION: ABDOMEN
LOCATION: THROAT

## 2024-05-26 ASSESSMENT — PAIN - FUNCTIONAL ASSESSMENT
PAIN_FUNCTIONAL_ASSESSMENT: ACTIVITIES ARE NOT PREVENTED
PAIN_FUNCTIONAL_ASSESSMENT: PREVENTS OR INTERFERES SOME ACTIVE ACTIVITIES AND ADLS

## 2024-05-26 ASSESSMENT — PAIN DESCRIPTION - ORIENTATION
ORIENTATION: LOWER
ORIENTATION: ANTERIOR

## 2024-05-26 NOTE — ED PROVIDER NOTES
[facility] EMERGENCY DEPT  EMERGENCY DEPARTMENT ENCOUNTER       Pt Name: Shameka Beltran  MRN: [id number]  Birthdate 1960  Date of evaluation: 5/26/2024  Provider: Obed Guerrero DO   PCP: Jaguar Beltran MD  Note Started: 5:42 AM EDT 5/26/24     CHIEF COMPLAINT       Chief Complaint   Patient presents with    Abdominal Pain     Pt arrives ambulatory to tr w/ cc of generalized abdominal pain and N/V/D. Per pt, recent obstruction and feels the same.     Dysuria     Pt states she has been drinking lots of water but has not fully emptied her bladder. Last time pt states she fully emptied her bladder was yeserday        HISTORY OF PRESENT ILLNESS: 1 or more elements      History From: patient, History limited by: none     Shameka Beltran is a 63 y.o. female presents to ED by POV for evaluation of abdominal pain .        Please See MDM for Additional Details of the HPI/PMH  Nursing Notes were all reviewed and agreed with or any disagreements were addressed in the HPI.     REVIEW OF SYSTEMS        Positives and Pertinent negatives as per HPI.    PAST HISTORY     Past Medical History:  Past Medical History:   Diagnosis Date    Salivary gland stone     Sleep difficulties     Small bowel obstruction (HCC) 3/30/2014    Stiff person syndrome 2010    Dr. Mason Grande neurologist       Past Surgical History:  Past Surgical History:   Procedure Laterality Date    HYSTERECTOMY (CERVIX STATUS UNKNOWN)      LAPAROTOMY N/A 10/25/2023    LAPAROTOMY EXPLORATORY WITH LYSIS OF ADHESIONS performed by Solomon Chicas MD at [facility] MAIN OR    TX APPENDECTOMY      SALPINGO-OOPHORECTOMY         Family History:  Family History   Problem Relation Age of Onset    Heart Disease Father     Dementia Mother     Diabetes Other         maternal great aunt    Thyroid Disease Neg Hx        Social History:  Social History     Tobacco Use    Smoking status: Never    Smokeless tobacco: Never   Vaping Use    Vaping Use: Never used   Substance Use Topics    Alcohol use:

## 2024-05-26 NOTE — ED NOTES
ED TO INPATIENT SBAR HANDOFF    Patient Name: Shameka Beltran   Preferred Name: Shameka  : 1960  63 y.o.   Family/Caregiver Present: no   Code Status Order: Full Code  PO Status: NPO:Yes  Telemetry Order:   C-SSRS: Risk of Suicide: No Risk  Sitter no   Restraints:     Sepsis Risk Score Sepsis Risk Score: 0.35    Situation  Chief Complaint   Patient presents with    Abdominal Pain     Pt arrives ambulatory to tr w/ cc of generalized abdominal pain and N/V/D. Per pt, recent obstruction and feels the same.     Dysuria     Pt states she has been drinking lots of water but has not fully emptied her bladder. Last time pt states she fully emptied her bladder was yeserday     Brief Description of Patient's Condition: Pt resting in bed at this time. Pt states the NG tube is very uncomfortable. Giving PRN pain meds for this at this time.   Mental Status: oriented, alert, coherent, logical, thought processes intact, and able to concentrate and follow conversation  Arrived from:Home  Imaging:   XR CHEST PORTABLE   Final Result   No significant change.      CT ABDOMEN PELVIS W IV CONTRAST Additional Contrast? Oral   Final Result   Mildly dilated contrast-filled small bowel loops in the left and mid abdomen,   though without transition point to suggest obstruction. This may represent   ileus. No free air, free fluid. Prior appendectomy.        Abnormal labs:   Abnormal Labs Reviewed   COMPREHENSIVE METABOLIC PANEL - Abnormal; Notable for the following components:       Result Value    Glucose 110 (*)     Albumin/Globulin Ratio 1.0 (*)     All other components within normal limits       Background  Allergies:   Allergies   Allergen Reactions    Other Hives     Solu medrol     Methimazole Rash     History:   Past Medical History:   Diagnosis Date    Salivary gland stone     Sleep difficulties     Small bowel obstruction (HCC) 3/30/2014    Stiff person syndrome 2010    Dr. Mason Grande neurologist       Assessment  Vitals:

## 2024-05-26 NOTE — ED NOTES
Patient:  GEORGES YUAN   YOB: 1960  MRN:  [id number]  Location: ED    ER16-16  5/26/24 6:07 AM   [phone] Hospital or Facility: [facility] From: Cm Llanes RE: GEORGES YUAN 1960 RM: ER16-16 There is no order in for the NG tube to be hooked up to suction. Would you like it to be? Need Callback: NEEDS CALLBACK ER  1 of 2 read    Tru Hdz MD -  5/26/24 6:07 AM  Yes low contiguous  5/26/24 6:15 AM   thanks  1 of 2 read    5/26/24 6:22 AM   Does she need the 6am labs that you ordered for this morning if she just had these same labs less than 3 hours ago?  1 of 2 read    Tru Hdz MD -  5/26/24 6:22 AM  Tomorrow  5/26/24 6:26 AM

## 2024-05-26 NOTE — H&P
HISTORY AND PHYSICAL             Date: 5/26/2024        Patient Name: Shameka Beltran     YOB: 1960      Age:  63 y.o.    Chief Complaint     Chief Complaint   Patient presents with    Abdominal Pain     Pt arrives ambulatory to tr w/ cc of generalized abdominal pain and N/V/D. Per pt, recent obstruction and feels the same.     Dysuria     Pt states she has been drinking lots of water but has not fully emptied her bladder. Last time pt states she fully emptied her bladder was yeserday          History Obtained From   patient    History of Present Illness   Pt well known to surgical service with multiple prior hospitalizations for SBO, ultimately required laparotomy and enterolysis Oct 2023 with Dr. Chicas.  She states she has had loose stools since but last night developed abdominal pain, nausea and vomiting.  No flatus or BM x 24 hours.  CT shows more ileus type pattern with proximal SB distention but no transition point and fluid filled colon.    Past Medical History     Past Medical History:   Diagnosis Date    Salivary gland stone     Sleep difficulties     Small bowel obstruction (HCC) 3/30/2014    Stiff person syndrome 2010    Dr. Mason Grande neurologist        Past Surgical History     Past Surgical History:   Procedure Laterality Date    HYSTERECTOMY (CERVIX STATUS UNKNOWN)      LAPAROTOMY N/A 10/25/2023    LAPAROTOMY EXPLORATORY WITH LYSIS OF ADHESIONS performed by Solomon Chicas MD at [facility] MAIN OR    UT APPENDECTOMY      SALPINGO-OOPHORECTOMY          Medications Prior to Admission     Prior to Admission medications    Medication Sig Start Date End Date Taking? Authorizing Provider   diazePAM (VALIUM) 5 MG tablet TAKE 1 TABLET BY MOUTH THREE TIMES DAILY AS NEEDED FOR MUSCLE SPASMS 5/22/24 11/22/24  Donavan Bansal MD   Multiple Vitamins-Minerals (THERAPEUTIC MULTIVITAMIN-MINERALS) tablet Take 1 tablet by mouth daily  Patient not taking: Reported on 5/26/2024    ProviderHernan MD

## 2024-05-26 NOTE — PROGRESS NOTES
End of Shift Note    Bedside shift change report given to MELY Osorio (oncoming nurse) by JAQUI SHEPHERD RN (offgoing nurse).  Report included the following information SBAR, ED Summary, MAR, and Recent Results    Shift worked:  7a to 7p     Shift summary and any significant changes:     Admitted from ED early this am.  NGT at 55 cm,, not draining much even after flushing.  C/O abd pain , medicated with IV Dilaudid x 3 this shift. No nausea. Up with one assist to bathroom, passing flatus, no BM.      Concerns for physician to address:       Zone phone for oncoming shift:          Activity:     Number times ambulated in hallways past shift: 0  Number of times OOB to chair past shift: 1    Cardiac:   Cardiac Monitoring: No           Access:  Current line(s): PIV     Genitourinary:   Urinary status: voiding    Respiratory:      Chronic home O2 use?: NO  Incentive spirometer at bedside: NO       GI:     Current diet:  Diet NPO Exceptions are: Sips of Water with Meds, Ice Chips  Passing flatus: YES  Tolerating current diet: YES, NPO       Pain Management:   Patient states pain is manageable on current regimen: YES    Skin:     Interventions: increase time out of bed    Patient Safety:  Fall Score:    Interventions: gripper socks and pt to call before getting OOB       Length of Stay:  Expected LOS: 5  Actual LOS: 0      JAQUI SHEPHERD RN

## 2024-05-27 ENCOUNTER — APPOINTMENT (OUTPATIENT)
Facility: HOSPITAL | Age: 64
DRG: 390 | End: 2024-05-27
Payer: COMMERCIAL

## 2024-05-27 LAB
ANION GAP SERPL CALC-SCNC: 4 MMOL/L (ref 5–15)
BASOPHILS # BLD: 0.1 K/UL (ref 0–0.1)
BASOPHILS NFR BLD: 1 % (ref 0–1)
BUN SERPL-MCNC: 6 MG/DL (ref 6–20)
BUN/CREAT SERPL: 9 (ref 12–20)
CALCIUM SERPL-MCNC: 8.9 MG/DL (ref 8.5–10.1)
CHLORIDE SERPL-SCNC: 108 MMOL/L (ref 97–108)
CO2 SERPL-SCNC: 26 MMOL/L (ref 21–32)
CREAT SERPL-MCNC: 0.67 MG/DL (ref 0.55–1.02)
DIFFERENTIAL METHOD BLD: ABNORMAL
EOSINOPHIL # BLD: 0.1 K/UL (ref 0–0.4)
EOSINOPHIL NFR BLD: 2 % (ref 0–7)
ERYTHROCYTE [DISTWIDTH] IN BLOOD BY AUTOMATED COUNT: 13.8 % (ref 11.5–14.5)
GLUCOSE SERPL-MCNC: 147 MG/DL (ref 65–100)
HCT VFR BLD AUTO: 37.5 % (ref 35–47)
HGB BLD-MCNC: 12.2 G/DL (ref 11.5–16)
IMM GRANULOCYTES # BLD AUTO: 0 K/UL (ref 0–0.04)
IMM GRANULOCYTES NFR BLD AUTO: 1 % (ref 0–0.5)
LYMPHOCYTES # BLD: 1.7 K/UL (ref 0.8–3.5)
LYMPHOCYTES NFR BLD: 27 % (ref 12–49)
MCH RBC QN AUTO: 31.9 PG (ref 26–34)
MCHC RBC AUTO-ENTMCNC: 32.5 G/DL (ref 30–36.5)
MCV RBC AUTO: 97.9 FL (ref 80–99)
MONOCYTES # BLD: 0.6 K/UL (ref 0–1)
MONOCYTES NFR BLD: 9 % (ref 5–13)
NEUTS SEG # BLD: 3.8 K/UL (ref 1.8–8)
NEUTS SEG NFR BLD: 60 % (ref 32–75)
NRBC # BLD: 0 K/UL (ref 0–0.01)
NRBC BLD-RTO: 0 PER 100 WBC
PHOSPHATE SERPL-MCNC: 4.1 MG/DL (ref 2.6–4.7)
PLATELET # BLD AUTO: 263 K/UL (ref 150–400)
PMV BLD AUTO: 8.9 FL (ref 8.9–12.9)
POTASSIUM SERPL-SCNC: 4 MMOL/L (ref 3.5–5.1)
RBC # BLD AUTO: 3.83 M/UL (ref 3.8–5.2)
SODIUM SERPL-SCNC: 138 MMOL/L (ref 136–145)
WBC # BLD AUTO: 6.3 K/UL (ref 3.6–11)

## 2024-05-27 PROCEDURE — 2500000003 HC RX 250 WO HCPCS: Performed by: SURGERY

## 2024-05-27 PROCEDURE — 2580000003 HC RX 258: Performed by: SURGERY

## 2024-05-27 PROCEDURE — 6370000000 HC RX 637 (ALT 250 FOR IP): Performed by: NURSE PRACTITIONER

## 2024-05-27 PROCEDURE — 80048 BASIC METABOLIC PNL TOTAL CA: CPT

## 2024-05-27 PROCEDURE — 36415 COLL VENOUS BLD VENIPUNCTURE: CPT

## 2024-05-27 PROCEDURE — 99231 SBSQ HOSP IP/OBS SF/LOW 25: CPT | Performed by: NURSE PRACTITIONER

## 2024-05-27 PROCEDURE — 85025 COMPLETE CBC W/AUTO DIFF WBC: CPT

## 2024-05-27 PROCEDURE — 84100 ASSAY OF PHOSPHORUS: CPT

## 2024-05-27 PROCEDURE — 74019 RADEX ABDOMEN 2 VIEWS: CPT

## 2024-05-27 PROCEDURE — 1100000000 HC RM PRIVATE

## 2024-05-27 RX ORDER — DIAZEPAM 5 MG/1
5 TABLET ORAL 3 TIMES DAILY PRN
Status: DISCONTINUED | OUTPATIENT
Start: 2024-05-27 | End: 2024-05-28 | Stop reason: HOSPADM

## 2024-05-27 RX ADMIN — DIAZEPAM 5 MG: 5 TABLET ORAL at 16:04

## 2024-05-27 RX ADMIN — HYDROMORPHONE HYDROCHLORIDE 0.5 MG: 1 INJECTION, SOLUTION INTRAMUSCULAR; INTRAVENOUS; SUBCUTANEOUS at 13:25

## 2024-05-27 RX ADMIN — HYDROMORPHONE HYDROCHLORIDE 0.5 MG: 1 INJECTION, SOLUTION INTRAMUSCULAR; INTRAVENOUS; SUBCUTANEOUS at 21:22

## 2024-05-27 RX ADMIN — POTASSIUM CHLORIDE, DEXTROSE MONOHYDRATE AND SODIUM CHLORIDE: 150; 5; 450 INJECTION, SOLUTION INTRAVENOUS at 21:23

## 2024-05-27 RX ADMIN — HYDROMORPHONE HYDROCHLORIDE 0.5 MG: 1 INJECTION, SOLUTION INTRAMUSCULAR; INTRAVENOUS; SUBCUTANEOUS at 01:35

## 2024-05-27 RX ADMIN — HYDROMORPHONE HYDROCHLORIDE 0.5 MG: 1 INJECTION, SOLUTION INTRAMUSCULAR; INTRAVENOUS; SUBCUTANEOUS at 06:39

## 2024-05-27 RX ADMIN — HYDROMORPHONE HYDROCHLORIDE 0.5 MG: 1 INJECTION, SOLUTION INTRAMUSCULAR; INTRAVENOUS; SUBCUTANEOUS at 09:52

## 2024-05-27 RX ADMIN — SODIUM CHLORIDE, PRESERVATIVE FREE 10 ML: 5 INJECTION INTRAVENOUS at 09:51

## 2024-05-27 RX ADMIN — POTASSIUM CHLORIDE, DEXTROSE MONOHYDRATE AND SODIUM CHLORIDE: 150; 5; 450 INJECTION, SOLUTION INTRAVENOUS at 13:36

## 2024-05-27 ASSESSMENT — PAIN DESCRIPTION - DESCRIPTORS
DESCRIPTORS: ACHING
DESCRIPTORS: ACHING;DISCOMFORT;POUNDING;PRESSURE

## 2024-05-27 ASSESSMENT — PAIN SCALES - GENERAL
PAINLEVEL_OUTOF10: 3
PAINLEVEL_OUTOF10: 8
PAINLEVEL_OUTOF10: 3
PAINLEVEL_OUTOF10: 7
PAINLEVEL_OUTOF10: 7
PAINLEVEL_OUTOF10: 10
PAINLEVEL_OUTOF10: 9

## 2024-05-27 ASSESSMENT — PAIN DESCRIPTION - ORIENTATION
ORIENTATION: RIGHT;UPPER;ANTERIOR
ORIENTATION: UPPER;RIGHT
ORIENTATION: ANTERIOR
ORIENTATION: RIGHT;UPPER;ANTERIOR

## 2024-05-27 ASSESSMENT — PAIN - FUNCTIONAL ASSESSMENT
PAIN_FUNCTIONAL_ASSESSMENT: PREVENTS OR INTERFERES SOME ACTIVE ACTIVITIES AND ADLS
PAIN_FUNCTIONAL_ASSESSMENT: ACTIVITIES ARE NOT PREVENTED
PAIN_FUNCTIONAL_ASSESSMENT: PREVENTS OR INTERFERES SOME ACTIVE ACTIVITIES AND ADLS
PAIN_FUNCTIONAL_ASSESSMENT: PREVENTS OR INTERFERES SOME ACTIVE ACTIVITIES AND ADLS
PAIN_FUNCTIONAL_ASSESSMENT: ACTIVITIES ARE NOT PREVENTED

## 2024-05-27 ASSESSMENT — PAIN DESCRIPTION - LOCATION
LOCATION: ABDOMEN;FOOT
LOCATION: ABDOMEN
LOCATION: ABDOMEN;HEAD;NECK
LOCATION: ABDOMEN
LOCATION: ABDOMEN;HEAD

## 2024-05-27 NOTE — PROGRESS NOTES
Surgery NP Progress Note    Shameka Beltran  [id number]  female  63 y.o.  1960    Admitted for Principal Problem:    SBO (small bowel obstruction) (HCC)  Resolved Problems:    * No resolved hospital problems. *    Pt seen with Dr. Lund    Assessment:     Ileus vs SBO- resolving clinically and by imaging.     Plan/Recommendations/Medical Decision Making:     - Mobilize with nursing and OOB to chair for meals  - NGT removed and CLEAR LIQUID DIET today  - Pain management- Continue current pain control methods.   - Anticipate d/c tomorrow if tolerating diet.     Subjective:     Patient without nausea. Passing flatus, no bowel movement yet.     Objective:     Blood pressure (!) 166/81, pulse 63, temperature 97.5 °F (36.4 °C), temperature source Oral, resp. rate 16, height 1.524 m (5'), weight 43.5 kg (95 lb 14.4 oz), SpO2 98 %.    Temp (24hrs), Av.9 °F (36.6 °C), Min:97.5 °F (36.4 °C), Max:98.4 °F (36.9 °C)      Pt resting in bed NAD   SCDs for mechanical DVT proph while in bed   Abd soft and minimally tender.     Body mass index is 18.73 kg/m².     Reference: BMI greater than 30 is classified as obesity and greater than 40 is classified as morbid obesity.       LLUVIA Manjarrez - NP   MSN, APRN, FNP-C, CWOCN-AP, RNAS-C    24

## 2024-05-27 NOTE — PROGRESS NOTES
End of Shift Note    Bedside shift change report given to MELY Osorio (oncoming nurse) by JAQUI SHEPHERD RN (offgoing nurse).  Report included the following information SBAR, ED Summary, MAR, and Recent Results    Shift worked:  7a to 7p     Shift summary and any significant changes:     NGT discontinued today, started on clear liquids,  Valium prn added , one dose given,helping to bring BP and anxiety down.  Passing flatus, and had a large loose stool!! Medicated for pain x 2 today.     Concerns for physician to address:       Zone phone for oncoming shift:   [phone]       Activity:     Number times ambulated in hallways past shift: 0, ambulates in room to bathroom frequently  Number of times OOB to chair past shift: refuses to sit up in chair, only sits on side of bed.     Cardiac:   Cardiac Monitoring: No           Access:  Current line(s): PIV     Genitourinary:   Urinary status: voiding    Respiratory:      Chronic home O2 use?: NO  Incentive spirometer at bedside: NO       GI:     Current diet:  ADULT DIET; Clear Liquid  Passing flatus: YES  Tolerating current diet: YES, NPO       Pain Management:   Patient states pain is manageable on current regimen: YES    Skin:     Interventions: increase time out of bed    Patient Safety:  Fall Score:    Interventions: gripper socks and pt to call before getting OOB       Length of Stay:  Expected LOS: 5  Actual LOS: 1      JAQUI SHEPHERD RN

## 2024-05-28 VITALS
BODY MASS INDEX: 18.83 KG/M2 | WEIGHT: 95.9 LBS | DIASTOLIC BLOOD PRESSURE: 73 MMHG | OXYGEN SATURATION: 95 % | HEIGHT: 60 IN | RESPIRATION RATE: 16 BRPM | HEART RATE: 72 BPM | SYSTOLIC BLOOD PRESSURE: 160 MMHG | TEMPERATURE: 98.2 F

## 2024-05-28 LAB
ANION GAP SERPL CALC-SCNC: 4 MMOL/L (ref 5–15)
BASOPHILS # BLD: 0 K/UL (ref 0–0.1)
BASOPHILS NFR BLD: 1 % (ref 0–1)
BUN SERPL-MCNC: 5 MG/DL (ref 6–20)
BUN/CREAT SERPL: 8 (ref 12–20)
CALCIUM SERPL-MCNC: 9.1 MG/DL (ref 8.5–10.1)
CHLORIDE SERPL-SCNC: 106 MMOL/L (ref 97–108)
CO2 SERPL-SCNC: 28 MMOL/L (ref 21–32)
CREAT SERPL-MCNC: 0.6 MG/DL (ref 0.55–1.02)
DIFFERENTIAL METHOD BLD: ABNORMAL
EOSINOPHIL # BLD: 0.2 K/UL (ref 0–0.4)
EOSINOPHIL NFR BLD: 3 % (ref 0–7)
ERYTHROCYTE [DISTWIDTH] IN BLOOD BY AUTOMATED COUNT: 13.1 % (ref 11.5–14.5)
GLUCOSE SERPL-MCNC: 117 MG/DL (ref 65–100)
HCT VFR BLD AUTO: 35.5 % (ref 35–47)
HGB BLD-MCNC: 11.6 G/DL (ref 11.5–16)
IMM GRANULOCYTES # BLD AUTO: 0 K/UL (ref 0–0.04)
IMM GRANULOCYTES NFR BLD AUTO: 0 % (ref 0–0.5)
LYMPHOCYTES # BLD: 2.1 K/UL (ref 0.8–3.5)
LYMPHOCYTES NFR BLD: 35 % (ref 12–49)
MCH RBC QN AUTO: 31.8 PG (ref 26–34)
MCHC RBC AUTO-ENTMCNC: 32.7 G/DL (ref 30–36.5)
MCV RBC AUTO: 97.3 FL (ref 80–99)
MONOCYTES # BLD: 0.6 K/UL (ref 0–1)
MONOCYTES NFR BLD: 10 % (ref 5–13)
NEUTS SEG # BLD: 3.1 K/UL (ref 1.8–8)
NEUTS SEG NFR BLD: 51 % (ref 32–75)
NRBC # BLD: 0 K/UL (ref 0–0.01)
NRBC BLD-RTO: 0 PER 100 WBC
PLATELET # BLD AUTO: 250 K/UL (ref 150–400)
PMV BLD AUTO: 9.1 FL (ref 8.9–12.9)
POTASSIUM SERPL-SCNC: 4.1 MMOL/L (ref 3.5–5.1)
RBC # BLD AUTO: 3.65 M/UL (ref 3.8–5.2)
SODIUM SERPL-SCNC: 138 MMOL/L (ref 136–145)
WBC # BLD AUTO: 6 K/UL (ref 3.6–11)

## 2024-05-28 PROCEDURE — 6370000000 HC RX 637 (ALT 250 FOR IP): Performed by: SURGERY

## 2024-05-28 PROCEDURE — 80048 BASIC METABOLIC PNL TOTAL CA: CPT

## 2024-05-28 PROCEDURE — 85025 COMPLETE CBC W/AUTO DIFF WBC: CPT

## 2024-05-28 PROCEDURE — 2580000003 HC RX 258: Performed by: SURGERY

## 2024-05-28 PROCEDURE — 99231 SBSQ HOSP IP/OBS SF/LOW 25: CPT | Performed by: NURSE PRACTITIONER

## 2024-05-28 PROCEDURE — 36415 COLL VENOUS BLD VENIPUNCTURE: CPT

## 2024-05-28 PROCEDURE — 6370000000 HC RX 637 (ALT 250 FOR IP): Performed by: NURSE PRACTITIONER

## 2024-05-28 PROCEDURE — 2500000003 HC RX 250 WO HCPCS: Performed by: SURGERY

## 2024-05-28 RX ORDER — SIMETHICONE 80 MG
80 TABLET,CHEWABLE ORAL EVERY 6 HOURS PRN
Status: DISCONTINUED | OUTPATIENT
Start: 2024-05-28 | End: 2024-05-28 | Stop reason: HOSPADM

## 2024-05-28 RX ADMIN — POTASSIUM CHLORIDE, DEXTROSE MONOHYDRATE AND SODIUM CHLORIDE: 150; 5; 450 INJECTION, SOLUTION INTRAVENOUS at 05:02

## 2024-05-28 RX ADMIN — SODIUM CHLORIDE, PRESERVATIVE FREE 10 ML: 5 INJECTION INTRAVENOUS at 09:06

## 2024-05-28 RX ADMIN — ACETAMINOPHEN 650 MG: 325 TABLET ORAL at 05:00

## 2024-05-28 RX ADMIN — DIAZEPAM 5 MG: 5 TABLET ORAL at 05:00

## 2024-05-28 RX ADMIN — HYDROMORPHONE HYDROCHLORIDE 0.5 MG: 1 INJECTION, SOLUTION INTRAMUSCULAR; INTRAVENOUS; SUBCUTANEOUS at 09:42

## 2024-05-28 RX ADMIN — SIMETHICONE 80 MG: 80 TABLET, CHEWABLE ORAL at 09:43

## 2024-05-28 ASSESSMENT — PAIN DESCRIPTION - ORIENTATION
ORIENTATION: RIGHT;ANTERIOR
ORIENTATION: RIGHT;LEFT

## 2024-05-28 ASSESSMENT — PAIN DESCRIPTION - LOCATION
LOCATION: HEAD
LOCATION: ABDOMEN

## 2024-05-28 ASSESSMENT — PAIN DESCRIPTION - DESCRIPTORS
DESCRIPTORS: CRAMPING
DESCRIPTORS: CRAMPING

## 2024-05-28 ASSESSMENT — PAIN SCALES - GENERAL
PAINLEVEL_OUTOF10: 3
PAINLEVEL_OUTOF10: 8
PAINLEVEL_OUTOF10: 7
PAINLEVEL_OUTOF10: 6

## 2024-05-28 ASSESSMENT — PAIN - FUNCTIONAL ASSESSMENT: PAIN_FUNCTIONAL_ASSESSMENT: ACTIVITIES ARE NOT PREVENTED

## 2024-05-28 NOTE — PLAN OF CARE
Problem: Discharge Planning  Goal: Discharge to home or other facility with appropriate resources  5/28/2024 0914 by Evan Moncada RN  Outcome: Progressing  5/28/2024 0629 by Liliya Rodríguez RN  Outcome: Progressing     Problem: Pain  Goal: Verbalizes/displays adequate comfort level or baseline comfort level  5/28/2024 0914 by Evan Moncada RN  Outcome: Progressing  5/28/2024 0629 by Liliya Rodríguez RN  Outcome: Progressing     Problem: ABCDS Injury Assessment  Goal: Absence of physical injury  5/28/2024 0914 by Evan Moncada RN  Outcome: Progressing  5/28/2024 0629 by Liliya Rodríguez RN  Outcome: Progressing     Problem: Safety - Adult  Goal: Free from fall injury  5/28/2024 0914 by Evan Moncada RN  Outcome: Progressing  5/28/2024 0629 by Liliya Rodríguez RN  Outcome: Progressing

## 2024-05-28 NOTE — PROGRESS NOTES
..DISCHARGE NOTE FROM SURGICAL-TELEMETRY NURSE    Patient determined to be stable for discharge by attending provider. I have reviewed the discharge instructions and follow-up appointments with the Patient. They verbalized understanding and all questions were answered to their satisfaction. No complaints or further questions were expressed.      No new medication scripts Appropriate educational materials and medication side effect teaching were provided.      PIV were removed prior to discharge.     Patient did not discharge with any line, gomes, or drain.    All personal items collected during admission were returned to the patient prior to discharge.    Post-op patient: Junie Moncada RN

## 2024-05-28 NOTE — DISCHARGE SUMMARY
Discharge Summary    Patient ID:  Shameka Beltran  [id number]  female  63 y.o.  1960    Admit date: 5/26/2024    Discharge date: 5/28/2024    Admitting Physician: Tru Hdz MD     Consulting Physician(s):   Treatment Team: Attending Provider: Ashely Lund MD; Consulting Physician: Tru Hdz MD; Utilization Reviewer: Jessica Lozada RN; : Fallon Blanchard                         HPI:  Pt is a 63 y.o. female who presents at this time with SBO requiring acute care monitoring and/or treatment.    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    SBO (small bowel obstruction) (HCC)    Hereditary and idiopathic peripheral neuropathy    Hashimoto's disease    Convulsion disorder (HCC)    Memory loss    Weakness generalized    Neck pain    Diplopia    Back pain    Vitamin D deficiency    Unspecified visual disturbance    B12 deficiency    TSH (thyroid-stimulating hormone deficiency)    Ocular myasthenia (HCC)    Diabetic peripheral neuropathy associated with type 2 diabetes mellitus (HCC)    Disturbance of memory    Cerebral microvascular disease    Bilateral carotid artery stenosis        Hospital Course:  Patient was managed conservatively with bowel rest and improved as expected. On the day of discharge, patient was able to tolerate a diet. Gastrografin demonstrated passage into the colon.    Transfusions:    Number of units banked PRBCs =   none     Activity: Patient mobilized with nursing and was found to be safe and steady with ambulation.     Discharged to: Home     Condition on Discharge: Stable     Discharge instructions:    - Take medications as prescribed  - Diet Low Fiber  - Discharge activity:    - Activity as tolerated    - Ambulate several times a day   - Do not drive if taking opioid pain medications    Allergies:    Allergies   Allergen Reactions    Other Hives     Solu medrol

## 2024-05-28 NOTE — CARE COORDINATION
Care Management Initial Assessment       RUR: 7%  Readmission? No  1st IM letter given? No  1st  letter given: No    CM completed assessment w/pt & .  No prior HH or DME use.  Patient is independent w/ADL to include driving.  Patient uses [facility].  Patient only reports her  as her support system.  Patient is expected to d/c today without any needs or concerns.   at bedside & will transport        05/28/24 5132   Service Assessment   Patient Orientation Alert and Oriented   Cognition Alert   History Provided By Patient;Spouse   Primary Caregiver Self   Support Systems Spouse/Significant Other   PCP Verified by CM Yes   Last Visit to PCP Within last 6 months   Prior Functional Level Independent in ADLs/IADLs   Current Functional Level Independent in ADLs/IADLs   Can patient return to prior living arrangement Yes   Family able to assist with home care needs: Yes   Would you like for me to discuss the discharge plan with any other family members/significant others, and if so, who? Yes  ()   Financial Resources Other (Comment)  ([employer])   Community Resources None   Social/Functional History   Lives With Spouse   Type of Home House  (two story home, but lives on first fl)   Home Equipment None   Active  Yes     Fallon Lo  Ext [phone]

## 2024-05-28 NOTE — PROGRESS NOTES
Surgery NP Progress Note    Shameka Beltran  [id number]  female  63 y.o.  1960    Admitted for Principal Problem:    SBO (small bowel obstruction) (HCC)  Resolved Problems:    * No resolved hospital problems. *      Assessment:     Ileus vs SBO- resolved, now with expected post-obstructive diarrhea    Plan/Recommendations/Medical Decision Making:     - Mobilize with nursing and OOB to chair for meals  - Low fiber diet   - Pain management- Continue current pain control methods.   - Home today after solid food.      Subjective:     Patient with diarrhea and cramping but no nausea/vomiting or bloating.     Objective:     Blood pressure (!) 173/77, pulse 63, temperature 97.9 °F (36.6 °C), temperature source Oral, resp. rate 16, height 1.524 m (5'), weight 43.5 kg (95 lb 14.4 oz), SpO2 100 %.    Temp (24hrs), Av °F (36.7 °C), Min:97.9 °F (36.6 °C), Max:98.2 °F (36.8 °C)      Pt resting in bed NAD   SCDs for mechanical DVT proph while in bed   Abd soft and minimally tender.     Body mass index is 18.73 kg/m².     Reference: BMI greater than 30 is classified as obesity and greater than 40 is classified as morbid obesity.       LLUVIA Manjarrez - NP   MSN, APRN, FNP-C, CWOCN-AP, RNAS-C    24

## 2024-05-28 NOTE — PLAN OF CARE
Problem: Discharge Planning  Goal: Discharge to home or other facility with appropriate resources  5/28/2024 0914 by Evan Moncada RN  Outcome: Progressing  5/28/2024 0629 by Liliya Rodríguez RN  Outcome: Progressing     Problem: Pain  Goal: Verbalizes/displays adequate comfort level or baseline comfort level  5/28/2024 0914 by Evan Moncaad RN  Outcome: Progressing  5/28/2024 0629 by Liliya Rodríguez RN  Outcome: Progressing     Problem: ABCDS Injury Assessment  Goal: Absence of physical injury  5/28/2024 0914 by Evan Moncada RN  Outcome: Progressing  5/28/2024 0629 by Liliya Rodríguez RN  Outcome: Progressing

## 2024-07-08 ENCOUNTER — HOSPITAL ENCOUNTER (EMERGENCY)
Facility: HOSPITAL | Age: 64
Discharge: HOME | End: 2024-07-08
Attending: STUDENT IN AN ORGANIZED HEALTH CARE EDUCATION/TRAINING PROGRAM
Payer: COMMERCIAL

## 2024-07-08 ENCOUNTER — APPOINTMENT (OUTPATIENT)
Facility: HOSPITAL | Age: 64
End: 2024-07-08
Payer: COMMERCIAL

## 2024-07-08 VITALS
HEART RATE: 77 BPM | RESPIRATION RATE: 18 BRPM | BODY MASS INDEX: 19.09 KG/M2 | TEMPERATURE: 97.7 F | DIASTOLIC BLOOD PRESSURE: 85 MMHG | SYSTOLIC BLOOD PRESSURE: 180 MMHG | WEIGHT: 97.22 LBS | OXYGEN SATURATION: 97 % | HEIGHT: 60 IN

## 2024-07-08 DIAGNOSIS — I10 HYPERTENSION, UNSPECIFIED TYPE: ICD-10-CM

## 2024-07-08 DIAGNOSIS — R07.9 CHEST PAIN, UNSPECIFIED TYPE: Primary | ICD-10-CM

## 2024-07-08 LAB
ALBUMIN SERPL-MCNC: 4.4 G/DL (ref 3.5–5)
ALBUMIN/GLOB SERPL: 1.2 (ref 1.1–2.2)
ALP SERPL-CCNC: 52 U/L (ref 45–117)
ALT SERPL-CCNC: 32 U/L (ref 12–78)
ANION GAP SERPL CALC-SCNC: 8 MMOL/L (ref 5–15)
AST SERPL-CCNC: 22 U/L (ref 15–37)
BASOPHILS # BLD: 0.1 K/UL (ref 0–0.1)
BASOPHILS NFR BLD: 1 % (ref 0–1)
BILIRUB SERPL-MCNC: 0.5 MG/DL (ref 0.2–1)
BUN SERPL-MCNC: 10 MG/DL (ref 6–20)
BUN/CREAT SERPL: 14 (ref 12–20)
CALCIUM SERPL-MCNC: 9.9 MG/DL (ref 8.5–10.1)
CHLORIDE SERPL-SCNC: 102 MMOL/L (ref 97–108)
CO2 SERPL-SCNC: 28 MMOL/L (ref 21–32)
CREAT SERPL-MCNC: 0.71 MG/DL (ref 0.55–1.02)
DIFFERENTIAL METHOD BLD: NORMAL
EOSINOPHIL # BLD: 0 K/UL (ref 0–0.4)
EOSINOPHIL NFR BLD: 0 % (ref 0–7)
ERYTHROCYTE [DISTWIDTH] IN BLOOD BY AUTOMATED COUNT: 12.5 % (ref 11.5–14.5)
GLOBULIN SER CALC-MCNC: 3.8 G/DL (ref 2–4)
GLUCOSE SERPL-MCNC: 97 MG/DL (ref 65–100)
HCT VFR BLD AUTO: 40.8 % (ref 35–47)
HGB BLD-MCNC: 13.7 G/DL (ref 11.5–16)
IMM GRANULOCYTES # BLD AUTO: 0 K/UL (ref 0–0.04)
IMM GRANULOCYTES NFR BLD AUTO: 0 % (ref 0–0.5)
LYMPHOCYTES # BLD: 2.8 K/UL (ref 0.8–3.5)
LYMPHOCYTES NFR BLD: 30 % (ref 12–49)
MAGNESIUM SERPL-MCNC: 2.3 MG/DL (ref 1.6–2.4)
MCH RBC QN AUTO: 32.5 PG (ref 26–34)
MCHC RBC AUTO-ENTMCNC: 33.6 G/DL (ref 30–36.5)
MCV RBC AUTO: 96.7 FL (ref 80–99)
MONOCYTES # BLD: 0.5 K/UL (ref 0–1)
MONOCYTES NFR BLD: 6 % (ref 5–13)
NEUTS SEG # BLD: 5.8 K/UL (ref 1.8–8)
NEUTS SEG NFR BLD: 63 % (ref 32–75)
NRBC # BLD: 0 K/UL (ref 0–0.01)
NRBC BLD-RTO: 0 PER 100 WBC
NT PRO BNP: 64 PG/ML
PLATELET # BLD AUTO: 342 K/UL (ref 150–400)
PMV BLD AUTO: 8.9 FL (ref 8.9–12.9)
POTASSIUM SERPL-SCNC: 3.7 MMOL/L (ref 3.5–5.1)
PROT SERPL-MCNC: 8.2 G/DL (ref 6.4–8.2)
RBC # BLD AUTO: 4.22 M/UL (ref 3.8–5.2)
SODIUM SERPL-SCNC: 138 MMOL/L (ref 136–145)
TROPONIN I SERPL HS-MCNC: <4 NG/L (ref 0–51)
WBC # BLD AUTO: 9.3 K/UL (ref 3.6–11)

## 2024-07-08 PROCEDURE — 71046 X-RAY EXAM CHEST 2 VIEWS: CPT

## 2024-07-08 PROCEDURE — 36415 COLL VENOUS BLD VENIPUNCTURE: CPT

## 2024-07-08 PROCEDURE — 80053 COMPREHEN METABOLIC PANEL: CPT

## 2024-07-08 PROCEDURE — 85025 COMPLETE CBC W/AUTO DIFF WBC: CPT

## 2024-07-08 PROCEDURE — 84484 ASSAY OF TROPONIN QUANT: CPT

## 2024-07-08 PROCEDURE — 83880 ASSAY OF NATRIURETIC PEPTIDE: CPT

## 2024-07-08 PROCEDURE — 71275 CT ANGIOGRAPHY CHEST: CPT

## 2024-07-08 PROCEDURE — 99285 EMERGENCY DEPT VISIT HI MDM: CPT

## 2024-07-08 PROCEDURE — 93005 ELECTROCARDIOGRAM TRACING: CPT | Performed by: STUDENT IN AN ORGANIZED HEALTH CARE EDUCATION/TRAINING PROGRAM

## 2024-07-08 PROCEDURE — 6360000004 HC RX CONTRAST MEDICATION: Performed by: STUDENT IN AN ORGANIZED HEALTH CARE EDUCATION/TRAINING PROGRAM

## 2024-07-08 PROCEDURE — 6370000000 HC RX 637 (ALT 250 FOR IP): Performed by: STUDENT IN AN ORGANIZED HEALTH CARE EDUCATION/TRAINING PROGRAM

## 2024-07-08 PROCEDURE — 83735 ASSAY OF MAGNESIUM: CPT

## 2024-07-08 RX ORDER — LIDOCAINE 4 G/G
1 PATCH TOPICAL DAILY
Qty: 7 EACH | Refills: 0 | Status: SHIPPED | OUTPATIENT
Start: 2024-07-08 | End: 2024-07-15

## 2024-07-08 RX ORDER — OXYCODONE HYDROCHLORIDE 5 MG/1
5 TABLET ORAL ONCE
Status: COMPLETED | OUTPATIENT
Start: 2024-07-08 | End: 2024-07-08

## 2024-07-08 RX ORDER — DIAZEPAM 5 MG/1
5 TABLET ORAL ONCE
Status: COMPLETED | OUTPATIENT
Start: 2024-07-08 | End: 2024-07-08

## 2024-07-08 RX ADMIN — OXYCODONE 5 MG: 5 TABLET ORAL at 13:16

## 2024-07-08 RX ADMIN — DIAZEPAM 5 MG: 5 TABLET ORAL at 16:08

## 2024-07-08 RX ADMIN — OXYCODONE 5 MG: 5 TABLET ORAL at 17:07

## 2024-07-08 RX ADMIN — IOPAMIDOL 100 ML: 755 INJECTION, SOLUTION INTRAVENOUS at 16:41

## 2024-07-08 ASSESSMENT — PAIN DESCRIPTION - ORIENTATION
ORIENTATION: LEFT
ORIENTATION: LEFT

## 2024-07-08 ASSESSMENT — PAIN DESCRIPTION - PAIN TYPE: TYPE: ACUTE PAIN

## 2024-07-08 ASSESSMENT — PAIN DESCRIPTION - ONSET: ONSET: ON-GOING

## 2024-07-08 ASSESSMENT — PAIN DESCRIPTION - DESCRIPTORS: DESCRIPTORS: DISCOMFORT;SHARP

## 2024-07-08 ASSESSMENT — PAIN DESCRIPTION - LOCATION
LOCATION: RIB CAGE
LOCATION: RIB CAGE

## 2024-07-08 ASSESSMENT — PAIN DESCRIPTION - FREQUENCY: FREQUENCY: CONTINUOUS

## 2024-07-08 ASSESSMENT — PAIN SCALES - GENERAL
PAINLEVEL_OUTOF10: 10
PAINLEVEL_OUTOF10: 10

## 2024-07-08 ASSESSMENT — PAIN - FUNCTIONAL ASSESSMENT: PAIN_FUNCTIONAL_ASSESSMENT: ACTIVITIES ARE NOT PREVENTED

## 2024-07-08 NOTE — DISCHARGE INSTRUCTIONS
Please make a followup with your primary care physician and a cardiologist.  If you have any new or worsening concerning medical symptoms please return to the emergency department.

## 2024-07-09 NOTE — ED PROVIDER NOTES
7/8/24 1608)   oxyCODONE (ROXICODONE) immediate release tablet 5 mg (5 mg Oral Given 7/8/24 1707)       Medical Decision Making  Patient is a 64yo female who presented to the ED with atypical chest pain.  Largest concern was for PE given recent hospitalization and ?possible procedure.  No PE seen on CTA.  With point tenderness MSK-related pain possible - no recent trauma and no fracture seen, but with history possible that patient has had a muscle spasm.  Doubt ACS with no obvious acute ischemia on EKG and negative troponin.  Pain has been present for multiple days so do not feel serial troponin necessary.  Low risk heart score of 3 - discussed low risk of MACE and importance of cardiology followup but feel outpatient management appropriate.  Pain was controlled with PO oxycodone in ED. Discharged with lidocaine patches and topical Voltaren but with regular use of benzodiazepines think that any further narcotic use should be avoided if possible.    Amount and/or Complexity of Data Reviewed  External Data Reviewed: notes.     Details: Reviewed 5/28/24 discharge summary by Jeanie Zapata (NP with general surgery) from recent hospitalization where SBO resolved [facility] conservative management  Labs: ordered. Decision-making details documented in ED Course.  Radiology: ordered and independent interpretation performed. Decision-making details documented in ED Course.  ECG/medicine tests: ordered and independent interpretation performed. Decision-making details documented in ED Course.    Risk  OTC drugs.  Prescription drug management.        ED Course as of 07/09/24 0031   Mon Jul 08, 2024   1235 EKG is interpreted by me as sinus rhythm, heart rate 79, normal axis, normal intervals, q waves in inferior leads, no ST changes [WB]   1411 Serum labs including troponin unremarkable. CTA pending. [WB]   1528 Will give patient's usual PO valium dose for anxiety.  She has requested discharge from the ED.  When I explained why I felt CTA

## 2024-07-11 LAB
EKG ATRIAL RATE: 79 BPM
EKG DIAGNOSIS: NORMAL
EKG P AXIS: 79 DEGREES
EKG P-R INTERVAL: 144 MS
EKG Q-T INTERVAL: 398 MS
EKG QRS DURATION: 74 MS
EKG QTC CALCULATION (BAZETT): 456 MS
EKG R AXIS: 44 DEGREES
EKG T AXIS: 67 DEGREES
EKG VENTRICULAR RATE: 79 BPM

## 2024-08-20 ENCOUNTER — OFFICE VISIT (OUTPATIENT)
Age: 64
End: 2024-08-20
Payer: MEDICARE

## 2024-08-20 VITALS
TEMPERATURE: 97.5 F | HEART RATE: 74 BPM | SYSTOLIC BLOOD PRESSURE: 120 MMHG | HEIGHT: 60 IN | DIASTOLIC BLOOD PRESSURE: 80 MMHG | RESPIRATION RATE: 16 BRPM | OXYGEN SATURATION: 97 % | WEIGHT: 97 LBS | BODY MASS INDEX: 19.04 KG/M2

## 2024-08-20 DIAGNOSIS — R41.3 DISTURBANCE OF MEMORY: ICD-10-CM

## 2024-08-20 DIAGNOSIS — G25.82 STIFF-MAN SYNDROME: ICD-10-CM

## 2024-08-20 DIAGNOSIS — R41.3 MEMORY LOSS: Primary | ICD-10-CM

## 2024-08-20 DIAGNOSIS — G72.9 MYOPATHY: ICD-10-CM

## 2024-08-20 DIAGNOSIS — R25.2 MUSCLE CRAMPS: ICD-10-CM

## 2024-08-20 DIAGNOSIS — M50.90 CERVICAL NECK PAIN WITH EVIDENCE OF DISC DISEASE: ICD-10-CM

## 2024-08-20 PROCEDURE — 3017F COLORECTAL CA SCREEN DOC REV: CPT | Performed by: PSYCHIATRY & NEUROLOGY

## 2024-08-20 PROCEDURE — 1036F TOBACCO NON-USER: CPT | Performed by: PSYCHIATRY & NEUROLOGY

## 2024-08-20 PROCEDURE — G8420 CALC BMI NORM PARAMETERS: HCPCS | Performed by: PSYCHIATRY & NEUROLOGY

## 2024-08-20 PROCEDURE — G8427 DOCREV CUR MEDS BY ELIG CLIN: HCPCS | Performed by: PSYCHIATRY & NEUROLOGY

## 2024-08-20 PROCEDURE — 99214 OFFICE O/P EST MOD 30 MIN: CPT | Performed by: PSYCHIATRY & NEUROLOGY

## 2024-08-20 RX ORDER — ACETAMINOPHEN 160 MG
TABLET,DISINTEGRATING ORAL
COMMUNITY

## 2024-08-20 ASSESSMENT — PATIENT HEALTH QUESTIONNAIRE - PHQ9
1. LITTLE INTEREST OR PLEASURE IN DOING THINGS: NOT AT ALL
SUM OF ALL RESPONSES TO PHQ9 QUESTIONS 1 & 2: 0
SUM OF ALL RESPONSES TO PHQ QUESTIONS 1-9: 0
SUM OF ALL RESPONSES TO PHQ QUESTIONS 1-9: 0
2. FEELING DOWN, DEPRESSED OR HOPELESS: NOT AT ALL
SUM OF ALL RESPONSES TO PHQ QUESTIONS 1-9: 0
SUM OF ALL RESPONSES TO PHQ QUESTIONS 1-9: 0

## 2024-08-20 NOTE — PROGRESS NOTES
Consult  REFERRED BY:  Jaguar Beltran MD    CHIEF COMPLAINT: Patient seen for new problem of recent intestinal obstruction in May 2024 requiring hospitalization and responded to suction tube and rest, and did not have to have surgery, but then was followed in June with severe muscle spasms on the left chest wall and had a emergency room evaluation with a normal CTA of the chest and nothing was found and her muscle enzymes were all normal and all of her labs were normal and she got better with rest and was sent home.  Patient previously seen for new problem of severe pain in the right side of her abdomen that radiated into the anterior right thigh for which she went to the emergency room 4 days ago and had a negative workup with a CT of the abdomen that was unremarkable, after recent intestinal surgery for obstruction and for double vision that began about a year ago when she was hospitalized in August 2022 at [facility].      Subjective:     Shameka Beltran is a 63 y.o. right-handed  female, we are seeing at the request of Dr. Beltran, on urgent work in basis because of the patient having a emergency room visit in June for severe left-sided muscle cramps, but everything was normal including the CTA of the chest and all her labs and muscle enzymes, she was treated and released home and still has occasional muscle spasms.  She is on Valium 5 mg 3 times a day.  She is also hospitalized in May for intestinal obstruction that responded to suction tube and rest of the bowel.  She did not require repeat surgery.  Patient previously seen for severe abdominal pain, that seems to radiate into her right leg and for which she is seems to have had some difficulty using her right leg for unclear reason, because the strength of the leg was the same and her CT of the abdomen after her abdominal surgery for obstruction showed no new lesions.  On her evaluation today, she really does not have any new

## 2024-11-21 DIAGNOSIS — G25.82 STIFF-MAN SYNDROME: ICD-10-CM

## 2024-11-21 RX ORDER — DIAZEPAM 5 MG/1
TABLET ORAL
Qty: 90 TABLET | Refills: 5 | Status: SHIPPED | OUTPATIENT
Start: 2024-11-21 | End: 2025-05-24

## 2024-11-21 NOTE — TELEPHONE ENCOUNTER
Patient is asking for a medication refill of diazepam 5MG. She would like it sent to [facility] on file. Thank you.

## 2024-11-22 ENCOUNTER — HOSPITAL ENCOUNTER (OUTPATIENT)
Facility: HOSPITAL | Age: 64
Discharge: HOME | End: 2024-11-22
Attending: ORTHOPAEDIC SURGERY
Payer: MEDICARE

## 2024-11-22 DIAGNOSIS — M79.672 LEFT FOOT PAIN: ICD-10-CM

## 2024-11-22 PROCEDURE — 73718 MRI LOWER EXTREMITY W/O DYE: CPT

## 2024-12-26 ENCOUNTER — CLINICAL DOCUMENTATION (OUTPATIENT)
Age: 64
End: 2024-12-26

## 2024-12-26 NOTE — PROGRESS NOTES
I called the patient, she cannot move her big toe on her left foot after having severe pain, and we will going to do an EMG of her left leg to prove that is just not the nerve or the muscle, her MRI scan did not show any dislocation of the tendon but it was swollen and she has a lot of arthritis there we will do the EMG and try to prove that the nerve is intact which I suspect.

## 2025-03-31 ENCOUNTER — OFFICE VISIT (OUTPATIENT)
Age: 65
End: 2025-03-31
Payer: MEDICARE

## 2025-03-31 ENCOUNTER — TRANSCRIBE ORDERS (OUTPATIENT)
Facility: HOSPITAL | Age: 65
End: 2025-03-31

## 2025-03-31 VITALS
SYSTOLIC BLOOD PRESSURE: 170 MMHG | WEIGHT: 91 LBS | RESPIRATION RATE: 17 BRPM | DIASTOLIC BLOOD PRESSURE: 100 MMHG | OXYGEN SATURATION: 100 % | HEART RATE: 60 BPM | TEMPERATURE: 97.5 F | HEIGHT: 60 IN | BODY MASS INDEX: 17.87 KG/M2

## 2025-03-31 DIAGNOSIS — M47.27 LUMBOSACRAL RADICULOPATHY DUE TO DEGENERATIVE JOINT DISEASE OF SPINE: ICD-10-CM

## 2025-03-31 DIAGNOSIS — G25.82 STIFF-MAN SYNDROME: ICD-10-CM

## 2025-03-31 DIAGNOSIS — R41.3 DISTURBANCE OF MEMORY: ICD-10-CM

## 2025-03-31 DIAGNOSIS — M79.604 ACUTE LEG PAIN, RIGHT: Primary | ICD-10-CM

## 2025-03-31 DIAGNOSIS — G60.9 HEREDITARY AND IDIOPATHIC PERIPHERAL NEUROPATHY: Primary | ICD-10-CM

## 2025-03-31 DIAGNOSIS — M79.604 ACUTE LEG PAIN, RIGHT: ICD-10-CM

## 2025-03-31 DIAGNOSIS — M50.90 CERVICAL NECK PAIN WITH EVIDENCE OF DISC DISEASE: ICD-10-CM

## 2025-03-31 PROCEDURE — 1123F ACP DISCUSS/DSCN MKR DOCD: CPT | Performed by: PSYCHIATRY & NEUROLOGY

## 2025-03-31 PROCEDURE — G8427 DOCREV CUR MEDS BY ELIG CLIN: HCPCS | Performed by: PSYCHIATRY & NEUROLOGY

## 2025-03-31 PROCEDURE — 99214 OFFICE O/P EST MOD 30 MIN: CPT | Performed by: PSYCHIATRY & NEUROLOGY

## 2025-03-31 PROCEDURE — 1090F PRES/ABSN URINE INCON ASSESS: CPT | Performed by: PSYCHIATRY & NEUROLOGY

## 2025-03-31 PROCEDURE — 1036F TOBACCO NON-USER: CPT | Performed by: PSYCHIATRY & NEUROLOGY

## 2025-03-31 PROCEDURE — 3017F COLORECTAL CA SCREEN DOC REV: CPT | Performed by: PSYCHIATRY & NEUROLOGY

## 2025-03-31 PROCEDURE — G8400 PT W/DXA NO RESULTS DOC: HCPCS | Performed by: PSYCHIATRY & NEUROLOGY

## 2025-03-31 PROCEDURE — G8419 CALC BMI OUT NRM PARAM NOF/U: HCPCS | Performed by: PSYCHIATRY & NEUROLOGY

## 2025-03-31 RX ORDER — MECLIZINE HYDROCHLORIDE 25 MG/1
TABLET ORAL 3 TIMES DAILY PRN
COMMUNITY
Start: 2025-03-18

## 2025-03-31 ASSESSMENT — PATIENT HEALTH QUESTIONNAIRE - PHQ9
1. LITTLE INTEREST OR PLEASURE IN DOING THINGS: NOT AT ALL
SUM OF ALL RESPONSES TO PHQ QUESTIONS 1-9: 0
2. FEELING DOWN, DEPRESSED OR HOPELESS: NOT AT ALL
SUM OF ALL RESPONSES TO PHQ QUESTIONS 1-9: 0

## 2025-03-31 NOTE — PROGRESS NOTES
3/30/2014    Stiff person syndrome 2010    Dr. Mason Grande neurologist      Past Surgical History:   Procedure Laterality Date    HYSTERECTOMY (CERVIX STATUS UNKNOWN)      LAPAROTOMY N/A 10/25/2023    LAPAROTOMY EXPLORATORY WITH LYSIS OF ADHESIONS performed by Solomon Chicas MD at [facility] MAIN OR    MT APPENDECTOMY      SALPINGO-OOPHORECTOMY       Family History   Problem Relation Age of Onset    Heart Disease Father     Dementia Mother     Diabetes Other         maternal great aunt    Thyroid Disease Neg Hx       Social History     Tobacco Use    Smoking status: Never    Smokeless tobacco: Never   Substance Use Topics    Alcohol use: Not Currently         Current Outpatient Medications:     meclizine (ANTIVERT) 25 MG tablet, 3 times daily as needed, Disp: , Rfl:     diazePAM (VALIUM) 5 MG tablet, TAKE 1 TABLET BY MOUTH THREE TIMES DAILY AS NEEDED FOR MUSCLE SPASMS, Disp: 90 tablet, Rfl: 5    Cholecalciferol (VITAMIN D3) 50 MCG (2000 UT) CAPS, Take by mouth, Disp: , Rfl:         Allergies   Allergen Reactions    Other Hives     Solu medrol     Methimazole Rash      MRI Results (most recent):  @[facility]([id number]:1)@    @[facility]([id number]:1)@  Review of Systems:  A comprehensive review of systems was negative except for: Constitutional: positive for fatigue and malaise  Eyes: positive for visual disturbance and diplopia  Ears, nose, mouth, throat, and face: positive for tinnitus and tinnitus and dizziness  Musculoskeletal: positive for arthralgias, back pain, muscle weakness, myalgias, and stiff joints  Neurological: positive for dizziness, headaches, memory problems, and muscle spasms  Behvioral/Psych: positive for anxiety and depression  Endocrine: positive for possible Hashimoto's thyroiditis and latent diabetes    Vitals:    03/31/25 1012 03/31/25 1041   BP: (!) 158/88 (!) 170/100   BP Site: Left Upper Arm    Patient Position: Sitting    BP Cuff Size: Medium Adult    Pulse: 60    Resp: 17    Temp: 97.5

## 2025-04-02 ENCOUNTER — HOSPITAL ENCOUNTER (OUTPATIENT)
Facility: HOSPITAL | Age: 65
Discharge: HOME | End: 2025-04-05
Payer: MEDICARE

## 2025-04-02 DIAGNOSIS — M47.27 LUMBOSACRAL RADICULOPATHY DUE TO DEGENERATIVE JOINT DISEASE OF SPINE: ICD-10-CM

## 2025-04-02 DIAGNOSIS — M79.604 ACUTE LEG PAIN, RIGHT: ICD-10-CM

## 2025-04-02 PROCEDURE — 72110 X-RAY EXAM L-2 SPINE 4/>VWS: CPT

## 2025-05-16 DIAGNOSIS — G25.82 STIFF-MAN SYNDROME: ICD-10-CM

## 2025-05-16 NOTE — TELEPHONE ENCOUNTER
Patient calling back again to say she needs a refill for her diazepam 5 MG for her muscle spams. She was in disbelief that our office could take 2-3 business days for any medication refill. Thank you.

## 2025-05-16 NOTE — TELEPHONE ENCOUNTER
Patient is asking for a refill of diazepam 5MG. She would like it sent to [facility] in [address], [place]. Thank you.

## 2025-05-19 NOTE — TELEPHONE ENCOUNTER
Patient called to check on the status of the diazepam. Please give her a call back at [phone]

## 2025-05-20 RX ORDER — DIAZEPAM 5 MG/1
5 TABLET ORAL 3 TIMES DAILY PRN
Qty: 90 TABLET | Refills: 5 | Status: SHIPPED | OUTPATIENT
Start: 2025-05-20 | End: 2025-05-21 | Stop reason: SDUPTHER

## 2025-05-21 RX ORDER — DIAZEPAM 5 MG/1
5 TABLET ORAL 3 TIMES DAILY PRN
Qty: 90 TABLET | Refills: 5 | Status: SHIPPED | OUTPATIENT
Start: 2025-05-21 | End: 2025-11-22

## (undated) DEVICE — GLOVE ORANGE PI 7 1/2   MSG9075

## (undated) DEVICE — SYRINGE,TOOMEY,IRRIGATION,70CC,STERILE: Brand: MEDLINE

## (undated) DEVICE — Device

## (undated) DEVICE — 450 ML BOTTLE OF 0.05% CHLORHEXIDINE GLUCONATE IN 99.95% STERILE WATER FOR IRRIGATION, USP AND APPLICATOR.: Brand: IRRISEPT ANTIMICROBIAL WOUND LAVAGE

## (undated) DEVICE — DRAPE FLD WRM W44XL66IN C6L FOR INTRATEMP SYS THERMABASIN

## (undated) DEVICE — BLADE ES L6IN ELASTOMERIC COAT EXT DURABLE BEND UPTO 90DEG

## (undated) DEVICE — 3M™ MEDIPORE™ H SOFT CLOTH TAPE SHORT ROLL TAPE 6INCHES X 2 YARDS 16 ROLLS/CASE 2866S: Brand: 3M™ MEDIPORE™

## (undated) DEVICE — GLOVE ORTHO 7 1/2   MSG9475

## (undated) DEVICE — SEALER ENDOSCP NANO COAT OPN DIV CRV L JAW LIGASURE IMPACT

## (undated) DEVICE — SPONGE LAPAROTOMY W18XL18IN WHITE STRUNG RADIOPAQUE STERILE

## (undated) DEVICE — SUTURE STRATAFIX SYMMETRIC SZ 1 L18IN ABSRB VLT CT1 L36CM SXPP1A404

## (undated) DEVICE — 1LYRTR 16FR10ML100%SIL UMS SNP: Brand: MEDLINE INDUSTRIES, INC.

## (undated) DEVICE — MAJOR LAPAROTOMY-MRMC: Brand: MEDLINE INDUSTRIES, INC.

## (undated) DEVICE — SUTURE PERMAHAND SZ 3-0 L30IN NONABSORBABLE BLK SILK BRAID A304H

## (undated) DEVICE — PICO 7 10CM X 20CM: Brand: PICO™ 7

## (undated) DEVICE — SPONGE GZ W4XL4IN COT 12 PLY TYP VII WVN C FLD DSGN STERILE

## (undated) DEVICE — SUTURE PERMAHAND SZ 2-0 L30IN NONABSORBABLE BLK SILK W/O A305H

## (undated) DEVICE — YANKAUER,POOLE TIP,STERILE,50/CS: Brand: MEDLINE

## (undated) DEVICE — PAD,ABDOMINAL,5"X9",ST,LF,25/BX: Brand: MEDLINE INDUSTRIES, INC.

## (undated) DEVICE — SUTURE VCRL SZ 3-0 L27IN ABSRB UD L26MM SH 1/2 CIR J416H

## (undated) DEVICE — OPTIFOAM GENTLE SA, POSTOP, 4X12: Brand: MEDLINE

## (undated) DEVICE — WOUND RETRACTOR AND PROTECTOR: Brand: ALEXIS O WOUND PROTECTOR-RETRACTOR

## (undated) DEVICE — CANISTER, RIGID, 3000CC: Brand: MEDLINE INDUSTRIES, INC.

## (undated) DEVICE — SUTURE PERMAHAND SZ 3-0 L18IN NONABSORBABLE BLK L26MM SH C013D

## (undated) DEVICE — SOLUTION IRRIG 1000ML 0.9% SOD CHL USP POUR PLAS BTL